# Patient Record
Sex: MALE | Race: WHITE | Employment: OTHER | URBAN - METROPOLITAN AREA
[De-identification: names, ages, dates, MRNs, and addresses within clinical notes are randomized per-mention and may not be internally consistent; named-entity substitution may affect disease eponyms.]

---

## 2017-02-05 ENCOUNTER — APPOINTMENT (OUTPATIENT)
Dept: GENERAL RADIOLOGY | Age: 77
End: 2017-02-05
Attending: EMERGENCY MEDICINE
Payer: MEDICARE

## 2017-02-05 ENCOUNTER — HOSPITAL ENCOUNTER (EMERGENCY)
Age: 77
Discharge: HOME | End: 2017-02-05
Attending: EMERGENCY MEDICINE | Admitting: EMERGENCY MEDICINE
Payer: MEDICARE

## 2017-02-05 VITALS
SYSTOLIC BLOOD PRESSURE: 166 MMHG | RESPIRATION RATE: 16 BRPM | HEART RATE: 79 BPM | TEMPERATURE: 97.8 F | BODY MASS INDEX: 33.86 KG/M2 | HEIGHT: 72 IN | WEIGHT: 250 LBS | DIASTOLIC BLOOD PRESSURE: 84 MMHG | OXYGEN SATURATION: 97 %

## 2017-02-05 DIAGNOSIS — S52.501A CLOSED FRACTURE OF DISTAL END OF RIGHT RADIUS, UNSPECIFIED FRACTURE MORPHOLOGY, INITIAL ENCOUNTER: Primary | ICD-10-CM

## 2017-02-05 PROCEDURE — 99283 EMERGENCY DEPT VISIT LOW MDM: CPT

## 2017-02-05 PROCEDURE — 74011250636 HC RX REV CODE- 250/636: Performed by: EMERGENCY MEDICINE

## 2017-02-05 PROCEDURE — 73100 X-RAY EXAM OF WRIST: CPT

## 2017-02-05 PROCEDURE — 74011000250 HC RX REV CODE- 250: Performed by: EMERGENCY MEDICINE

## 2017-02-05 PROCEDURE — L3650 SO 8 ABD RESTRAINT PRE OTS: HCPCS

## 2017-02-05 PROCEDURE — 75810000303 HC CLSD TRMT  FRACTURE/DISLOCATION W/  ANES

## 2017-02-05 PROCEDURE — 73110 X-RAY EXAM OF WRIST: CPT

## 2017-02-05 RX ORDER — MELOXICAM 15 MG/1
15 TABLET ORAL DAILY
COMMUNITY

## 2017-02-05 RX ORDER — FLUOXETINE HYDROCHLORIDE 20 MG/1
20 CAPSULE ORAL DAILY
COMMUNITY
End: 2022-04-05

## 2017-02-05 RX ORDER — HYDROMORPHONE HYDROCHLORIDE 2 MG/ML
2 INJECTION, SOLUTION INTRAMUSCULAR; INTRAVENOUS; SUBCUTANEOUS
Status: DISCONTINUED | OUTPATIENT
Start: 2017-02-05 | End: 2017-02-05 | Stop reason: HOSPADM

## 2017-02-05 RX ORDER — TRAMADOL HYDROCHLORIDE 50 MG/1
50-100 TABLET ORAL
Qty: 25 TAB | Refills: 0 | Status: SHIPPED | OUTPATIENT
Start: 2017-02-05 | End: 2020-11-13

## 2017-02-05 RX ORDER — BUPIVACAINE HYDROCHLORIDE 5 MG/ML
5 INJECTION, SOLUTION EPIDURAL; INTRACAUDAL ONCE
Status: COMPLETED | OUTPATIENT
Start: 2017-02-05 | End: 2017-02-05

## 2017-02-05 RX ADMIN — BUPIVACAINE HYDROCHLORIDE 25 MG: 5 INJECTION, SOLUTION EPIDURAL; INTRACAUDAL at 11:24

## 2017-02-05 NOTE — ED PROVIDER NOTES
HPI Comments: 78 y.o. male with past medical history significant for hypercholesterolemia, depression, insomnia, obesity, peripheral neuropathy, PVCs, cardiac catheterization who presents from home with friend with chief complaint of right wrist pain. Pt reports that around 0530 this morning, pt went to his front door to  the paper. Pt bent over to  the paper and as he was standing up, he lost his balance and fell backwards into a wall. Pt then used his right arm to break the fall causing the moderate right wrist pain. When the pt was able to stand back up, he reports that he was briefly dizzy and went to go lay in bed. Pt currently rates the pain at 5/10. Pt denies hitting his head, HA, neck pain, and back pain. Pt also reports intermittent LE edema. Pt denies being on anticoagulants. Pt denies analgesic use and ice pack use PTA. Pt denies frequent falls and reports that his last fall was 6 months ago due to loss of balance. There are no other acute medical concerns at this time. Pt is R hand dominant. Social hx: former smoker, no EtOH use  PCP: Werner Noriega MD  Note written by Bradley Toussaint, as dictated by Yuly Vega, DO 11:11 AM        The history is provided by the patient. No  was used. Past Medical History:   Diagnosis Date    Depression     Hypercholesterolemia     Insomnia     Obesity     Peripheral neuropathy (HCC)     PVC's (premature ventricular contractions)        Past Surgical History:   Procedure Laterality Date    Pr cardiac surg procedure unlist  9/2010     catheterization    Endoscopy, colon, diagnostic  2003, 2010         Family History:   Problem Relation Age of Onset    Heart Disease Mother     Heart Disease Father        Social History     Social History    Marital status:      Spouse name: N/A    Number of children: N/A    Years of education: N/A     Occupational History    Not on file. Social History Main Topics    Smoking status: Former Smoker     Quit date: 2/25/2011    Smokeless tobacco: Not on file    Alcohol use No    Drug use: Not on file    Sexual activity: Not on file     Other Topics Concern    Not on file     Social History Narrative         ALLERGIES: Review of patient's allergies indicates no known allergies. Review of Systems   Musculoskeletal: Positive for arthralgias (right wrist pain). Negative for back pain and neck pain. Neurological: Negative for headaches. All other systems reviewed and are negative. Vitals:    02/05/17 0950   BP: 159/84   Pulse: 88   Resp: 16   Temp: 97.5 °F (36.4 °C)   SpO2: 98%   Weight: 113.4 kg (250 lb)   Height: 6' (1.829 m)            Physical Exam   Musculoskeletal:        Arms:  Nursing note and vitals reviewed. Constitutional: Pt is awake and alert. Pt appears well-developed and well-nourished. NAD. HENT:   Head: Normocephalic and atraumatic. Nose: Nose normal.   Mouth/Throat: Oropharynx is clear and moist. No oropharyngeal exudate. Eyes: Conjunctivae and extraocular motions are normal. Pupils are equal, round, and reactive to light. Right eye exhibits no discharge. Left eye exhibits no discharge. No scleral icterus. Neck: No tracheal deviation present. Supple neck. Cardiovascular: Normal rate, regular rhythm, normal heart sounds and intact distal pulses. Exam reveals no gallop and no friction rub. Intact radial pulse. No murmur heard. Pulmonary/Chest: Effort normal and breath sounds normal.  Pt  has no wheezes. Pt  has no rales. Abdominal: Soft. Pt  exhibits no distension and no mass. No tenderness. Pt  has no rebound and no guarding. Musculoskeletal:  Swollen and deformed wrist. Swollen fingers. Ext:  ; distal pulses are normal. Intact motor and sensory exam  Neurological:  Pt is alert. nonfocal neuro exam.  Skin: Skin is warm and dry. Pt  is not diaphoretic.    Psychiatric:  Pt  has a normal mood and affect. Behavior is normal.   Note written by Bradley Meng, as dictated by Alexus Esparza DO 11:13 AM            [facility]  ED Course       Splint, Jolynn Hensley  Date/Time: 2/5/2017 11:54 AM  Performed by: Romeo Olson  Authorized by: Romeo Olson     Consent:     Consent obtained:  Written    Consent given by:  Patient    Risks discussed:  Pain    Alternatives discussed:  No treatment  Pre-procedure details:     Sensation:  Normal  Procedure details:     Laterality:  Right    Location:  Arm    Arm:  R lower arm    Cast type:  Short arm    Splint type:  Sugar tong    Supplies:  Elastic bandage, Ortho-Glass and cotton padding  Post-procedure details:     Pain:  Improved    Sensation:  Normal    Skin color:  Pink    Patient tolerance of procedure: Tolerated well, no immediate complications                 11:12 AM  Hematoma block finished         Procedure Note - Reduction:  Right wrist, closed reduction distal radius fracture. Performed by Alexus Esparza DO . Immediately prior to the procedure, the patient was reevaluated and found suitable for the planned procedure and any planned medications. Immediately prior to the procedure a time out was called to verify the correct patient, procedure, equipment, staff, and marking as appropriate. Prior to the procedure, neurovascular exam was intact. Analgesia was obtained with hematoma block. .   To achieve reduction of the patient's right wrist joint, logitudinal traction manipulation was utilized with weights and finger traps. The fracture was successfully reduced. Neurovascular exam was intact following the procedure. The procedure was tolerated well. xrays reviewed    Plan - refer to ortho    12:41 PM  Pt was cautioned about using Tramadol with Clonazepam due to drug-drug interaction.

## 2017-02-05 NOTE — DISCHARGE INSTRUCTIONS
We hope that we have addressed all of your medical concerns. The examination and treatment you received in the Emergency Department were for an emergent problem and were not intended as complete care. It is important that you follow up with your healthcare provider(s) for ongoing care. If your symptoms worsen or do not improve as expected, and you are unable to reach your usual health care provider(s), you should return to the Emergency Department. Today's healthcare is undergoing tremendous change, and patient satisfaction surveys are one of the many tools to assess the quality of medical care. You may receive a survey from the [employer] regarding your experience in the Emergency Department. I hope that your experience has been completely positive, particularly the medical care that I provided. As such, please participate in the survey; anything less than excellent does not meet my expectations or intentions. [facility] and [address] [place] [address] participate in nationally recognized quality of care measures. If your blood pressure is greater than 120/80, as reported below, we urge that you seek medical care to address the potential of high blood pressure, commonly known as hypertension. Hypertension can be hereditary or can be caused by certain medical conditions, pain, stress, or \"white coat syndrome. \"       Please make an appointment with your health care provider(s) for follow up of your Emergency Department visit. VITALS:   Patient Vitals for the past 8 hrs:   Temp Pulse Resp BP SpO2   02/05/17 0950 97.5 °F (36.4 °C) 88 16 159/84 98 %          Thank you for allowing us to provide you with medical care today. We realize that you have many choices for your emergency care needs. Please choose us in the future for any continued health care needs.       Regards,           Isaac Bahena, DO    [employer]   Office: [phone]            No results found for this or any previous visit (from the past 24 hour(s)). Xr Wrist Rt Ap/lat    Result Date: 2/5/2017  INDICATION: will call when ready - post reduction. EXAM: Right wrist. COMPARISON: Earlier same day. FINDINGS: AP and lateral views of the right wrist show satisfactory distal radial metaphyseal fracture reduction appearance, with cast placement. IMPRESSION: Satisfactory postreduction appearance of the distal radial fracture. Xr Wrist Rt Ap/lat/obl Min 3v    Result Date: 2/5/2017  INDICATION: fall Right wrist exam. Three-view right wrist exam shows fracture of the distal radial metaphysis with dorsal displacement and angulation of the distal fragment. Ulna and carpal bones appear intact. There is soft tissue swelling. IMPRESSION: Distal radial fracture. Broken Wrist: Care Instructions  Your Care Instructions    Your wrist can break, or fracture, during sports, a fall, or other accidents. The break may happen when your wrist is hit or is used to protect you in a fall. Fractures can range from a small, hairline crack, to a bone or bones broken into two or more pieces. Your treatment depends on how bad the break is. Your doctor may have put your wrist in a cast or splint. This will help keep your wrist stable until your follow-up appointment. It may take weeks or months for your wrist to heal. You can help it heal with care at home. You heal best when you take good care of yourself. Eat a variety of healthy foods, and don't smoke. Follow-up care is a key part of your treatment and safety. Be sure to make and go to all appointments, and call your doctor if you are having problems. It's also a good idea to know your test results and keep a list of the medicines you take. How can you care for yourself at home? · Put ice or a cold pack on your wrist for 10 to 20 minutes at a time.  Try to do this every 1 to 2 hours for the next 3 days (when you are awake). Put a thin cloth between the ice and your cast or splint. Keep your cast or splint dry. · Follow the splint or cast care instructions your doctor gives you. If you have a splint, do not take it off unless your doctor tells you to. Be careful not to put the splint on too tight. · Be safe with medicines. Take pain medicines exactly as directed. ¨ If the doctor gave you a prescription medicine for pain, take it as prescribed. ¨ If you are not taking a prescription pain medicine, ask your doctor if you can take an over-the-counter medicine. · Prop up your wrist on pillows when you sit or lie down in the first few days after the injury. Keep your wrist higher than the level of your heart. This will help reduce swelling. · Move your fingers often to reduce swelling and stiffness, but do not use that hand to grab or carry anything. · Follow instructions for exercises to keep your arm strong. When should you call for help? Call your doctor now or seek immediate medical care if:  · You have increased or severe pain. · Your cast or splint feels too tight. · You cannot move your fingers. · You have tingling, weakness, or numbness in your hand and fingers. · Your hand and fingers are cool or pale or change color. · You have a lot of swelling near your cast or splint. · The skin under your cast or splint is burning or stinging. Watch closely for changes in your health, and be sure to contact your doctor if:  · You do not get better as expected. Where can you learn more? Go to http://genie-stan.info/. Enter [id number] in the search box to learn more about \"Broken Wrist: Care Instructions. \"  Current as of: May 23, 2016  Content Version: 11.1  © 2948-8026 [employer]. Care instructions adapted under license by [employer] (which disclaims liability or warranty for this information).  If you have questions about a medical condition or this instruction, always ask your healthcare professional. [place] [address] any warranty or liability for your use of this information. Caring for Your Splint: After Your Visit  Your Care Instructions     A splint protects a broken bone or other injury. If you have a removable splint, follow your doctor's instructions and only remove the splint if your doctor says you can. Most splints can be adjusted. Your doctor will show you how to do this and will tell you when you might need to adjust the splint. Many splints are premade. Your doctor may also make a splint from plaster or fiberglass. Some splints have a built-in air cushion. Air pads are inflated to hold the injured area in place. Follow-up care is a key part of your treatment and safety. Be sure to make and go to all appointments, and call your doctor if you are having problems. It's also a good idea to know your test results and keep a list of the medicines you take. How can you care for yourself at home? General care  · Don't put any weight on a splint. If you have a walking boot, your doctor will tell you when you can put weight on it. · If the fingers or toes on the limb with the splint were not injured, wiggle them every now and then. This helps move the blood and fluids in the injured limb. · Prop up the injured arm or leg on a pillow when you ice it or anytime you sit or lie down during the next 3 days. Try to keep it above the level of your heart. This will help reduce swelling. · Put ice or cold packs on the hurt area for 10 to 20 minutes at a time. Try to do this every 1 to 2 hours for the next 3 days (when you are awake) or until the swelling goes down. Be careful not to get the splint wet. · Be safe with medicines. Read and follow all instructions on the label. ¨ If the doctor gave you a prescription medicine for pain, take it as prescribed.   ¨ If you are not taking a prescription pain medicine, ask your doctor if you can take an over-the-counter medicine. · Keep up your muscle strength and tone as much as you can while protecting your injured limb or joint. Your doctor may want you to tense and relax the muscles protected by the splint. Check with your doctor or physical therapist for instructions. Splint and skin care  · If your splint is not to be removed, try blowing cool air from a hair dryer or fan into the splint to help relieve itching. Never stick items under your splint to scratch the skin. · Do not use oils or lotions near your splint. If the skin becomes red or sore around the edge of the splint, you may pad the edges with a soft material, such as moleskin, or use tape to cover the edges. · If you're allowed to take your splint off, be sure your skin is dry before you put it back on. · Watch for pressure sores. These can develop over bony areas. Symptoms include a warm spot under the cast, pain, drainage, or an odor. Call your doctor if you think you have a pressure sore. · Watch for compartment syndrome. This happens when pressure builds up in a group of muscles, nerves, and blood vessels. It is an emergency. Symptoms include severe pain or tingling or numbness. Water and your splint  · Keep your splint dry. Moisture can collect under the splint and cause skin irritation and itching. If you have a wound or have had surgery, moisture under the splint can increase the risk of infection. · Cover your splint with at least two layers of plastic when you take a shower or bath, unless your doctor said you can take it off while bathing. · If you can take the splint off when you bathe, pat the area dry after bathing and put the splint back on.  · If your splint gets a little wet, you can dry it with a hair dryer. Use a \"cool\" setting. When should you call for help? Call your doctor now or seek immediate medical care if:  · You have increased or severe pain. · You feel a warm or painful spot under the splint.   · You have problems with your splint. For example:  ¨ The skin under the splint burns or stings. ¨ The splint feels too tight. ¨ There is a lot of swelling near the splint. (Some swelling is normal.)  ¨ You have a new fever. ¨ There is drainage or a bad smell coming from the splint. · Your foot or hand is cool or pale or changes color. · You have trouble moving your fingers or toes. · You have symptoms of a blood clot in your arm or leg (called a deep vein thrombosis). These may include:  ¨ Pain in the arm, calf, back of the knee, thigh, or groin. ¨ Redness and swelling in the arm, leg, or groin. Watch closely for changes in your health, and be sure to contact your doctor if:  · The splint is breaking apart or losing its shape. · You are not getting better as expected. Where can you learn more? Go to [employer]  Enter M593 in the search box to learn more about \"Caring for Your Splint: After Your Visit. \"   © 2957-0716 [employer]. Care instructions adapted under license by [employer] (which disclaims liability or warranty for this information). This care instruction is for use with your licensed healthcare professional. If you have questions about a medical condition or this instruction, always ask your healthcare professional. [address] any warranty or liability for your use of this information.   Content Version: 57.7.964038; Current as of: June 4, 2014

## 2017-02-05 NOTE — ED TRIAGE NOTES
Pt states that he lost his balance this morning an fell backwards, slid down against a wall landing on his right hand. Pain and swelling noted to pt's right wrist area.

## 2017-02-05 NOTE — ED NOTES
DO and I have reviewed discharge instructions with the patient. The patient verbalized understanding.

## 2020-11-17 PROBLEM — H25.11 AGE-RELATED NUCLEAR CATARACT, RIGHT EYE: Chronic | Status: ACTIVE | Noted: 2020-11-17

## 2020-11-18 PROBLEM — H25.11 AGE-RELATED NUCLEAR CATARACT, RIGHT EYE: Chronic | Status: RESOLVED | Noted: 2020-11-17 | Resolved: 2020-11-18

## 2020-12-01 PROBLEM — H25.12 AGE-RELATED NUCLEAR CATARACT, LEFT EYE: Chronic | Status: ACTIVE | Noted: 2020-12-01

## 2020-12-02 PROBLEM — H25.12 AGE-RELATED NUCLEAR CATARACT, LEFT EYE: Chronic | Status: RESOLVED | Noted: 2020-12-01 | Resolved: 2020-12-02

## 2022-04-05 ENCOUNTER — OFFICE VISIT (OUTPATIENT)
Dept: FAMILY MEDICINE CLINIC | Age: 82
End: 2022-04-05
Payer: MEDICARE

## 2022-04-05 VITALS
BODY MASS INDEX: 34.54 KG/M2 | OXYGEN SATURATION: 96 % | HEART RATE: 80 BPM | RESPIRATION RATE: 16 BRPM | DIASTOLIC BLOOD PRESSURE: 68 MMHG | SYSTOLIC BLOOD PRESSURE: 110 MMHG | WEIGHT: 255 LBS | TEMPERATURE: 97.2 F | HEIGHT: 72 IN

## 2022-04-05 DIAGNOSIS — E11.9 TYPE 2 DIABETES MELLITUS TREATED WITHOUT INSULIN (HCC): ICD-10-CM

## 2022-04-05 DIAGNOSIS — F32.A ANXIETY AND DEPRESSION: ICD-10-CM

## 2022-04-05 DIAGNOSIS — E11.42 DIABETIC PERIPHERAL NEUROPATHY ASSOCIATED WITH TYPE 2 DIABETES MELLITUS (HCC): ICD-10-CM

## 2022-04-05 DIAGNOSIS — F41.9 ANXIETY AND DEPRESSION: ICD-10-CM

## 2022-04-05 DIAGNOSIS — H61.23 BILATERAL IMPACTED CERUMEN: Primary | ICD-10-CM

## 2022-04-05 PROCEDURE — 99204 OFFICE O/P NEW MOD 45 MIN: CPT | Performed by: FAMILY MEDICINE

## 2022-04-05 PROCEDURE — G9717 DOC PT DX DEP/BP F/U NT REQ: HCPCS | Performed by: FAMILY MEDICINE

## 2022-04-05 PROCEDURE — G8417 CALC BMI ABV UP PARAM F/U: HCPCS | Performed by: FAMILY MEDICINE

## 2022-04-05 PROCEDURE — G8427 DOCREV CUR MEDS BY ELIG CLIN: HCPCS | Performed by: FAMILY MEDICINE

## 2022-04-05 PROCEDURE — 1101F PT FALLS ASSESS-DOCD LE1/YR: CPT | Performed by: FAMILY MEDICINE

## 2022-04-05 PROCEDURE — G8536 NO DOC ELDER MAL SCRN: HCPCS | Performed by: FAMILY MEDICINE

## 2022-04-05 RX ORDER — SERTRALINE HYDROCHLORIDE 25 MG/1
TABLET, FILM COATED ORAL
COMMUNITY
Start: 2021-12-16 | End: 2022-04-05 | Stop reason: ALTCHOICE

## 2022-04-05 RX ORDER — POLYETHYLENE GLYCOL 3350 17 G/17G
1 POWDER, FOR SOLUTION ORAL AS NEEDED
COMMUNITY

## 2022-04-05 RX ORDER — DIAZEPAM 5 MG/1
TABLET ORAL
COMMUNITY
Start: 2022-01-27 | End: 2022-07-07

## 2022-04-05 RX ORDER — GABAPENTIN 300 MG/1
300 CAPSULE ORAL 2 TIMES DAILY
Qty: 60 CAPSULE | Refills: 5 | Status: SHIPPED | OUTPATIENT
Start: 2022-04-05 | End: 2022-10-16

## 2022-04-05 RX ORDER — SERTRALINE HYDROCHLORIDE 50 MG/1
50 TABLET, FILM COATED ORAL DAILY
Qty: 90 TABLET | Refills: 1 | Status: SHIPPED | OUTPATIENT
Start: 2022-04-05 | End: 2022-07-07 | Stop reason: DRUGHIGH

## 2022-04-05 NOTE — PROGRESS NOTES
1. Have you been to the ER, urgent care clinic since your last visit? Hospitalized since your last visit No    2. Have you seen or consulted any other health care providers outside of the [address] [place] [address] since your last visit? Include any pap smears or colon screening. Yes - 3/21/22 [facility]     4/5/2022      Chief Complaint   Patient presents with    Establish Care         History of Present Illness:        Ramu Lynn is a [de-identified] y.o. male followed most recently at [facility] for his controlled DM with peripheral neuropathy, HTN, and lipids. HgbA1c in March was 6.1% Feels a little depressed, was switched from fluoxetine to very low dose sertraline in December. No anxiety. Lived in [place] before moving here about three years ago. Greatest concern is proximal muscle weakness, numbness and tingling without pain in his extremities, and poor balance because he feels like he's walking on something in his shoes. Has done a couple of courses of PT for this which he found helpful, but admits to did not continue maintenance exercise. Sleeping OK, did not tolerate CPAP for YUSRA. No Known Allergies    Current Outpatient Medications   Medication Sig    polyethylene glycol (MIRALAX) 17 gram packet Take 1 Package by mouth as needed.  gabapentin (NEURONTIN) 300 mg capsule Take 1 Capsule by mouth two (2) times a day. Max Daily Amount: 600 mg.  sertraline (ZOLOFT) 50 mg tablet Take 1 Tablet by mouth daily.  hydroCHLOROthiazide (HYDRODIURIL) 12.5 mg tablet Take 1 tablet by mouth once daily    metoprolol tartrate (LOPRESSOR) 25 mg tablet Take 1/2 (one-half) tablet by mouth once daily    metFORMIN (GLUCOPHAGE) 500 mg tablet Take 1/2 (one-half) tablet by mouth twice daily    lisinopriL (PRINIVIL, ZESTRIL) 10 mg tablet TAKE 1 TABLET BY MOUTH ONCE DAILY    meloxicam (MOBIC) 15 mg tablet Take 15 mg by mouth daily.     lovastatin (MEVACOR) 40 mg tablet Take 1 Tab by mouth nightly.  ASPIRIN PO Take 81 mg by mouth daily.  diazePAM (VALIUM) 5 mg tablet TAKE 1 TABLET THE NIGHT BEFORE DENTAL VISIT AND 1 TABLET 1 HOUR BEFORE DENTAL VISIT (Patient not taking: Reported on 4/5/2022)     No current facility-administered medications for this visit. Physical Examination:    Visit Vitals  /68 (BP 1 Location: Left upper arm, BP Patient Position: Sitting, BP Cuff Size: Adult)   Pulse 80   Temp 97.2 °F (36.2 °C) (Oral)   Resp 16   Ht 6' (1.829 m)   Wt 255 lb (115.7 kg)   SpO2 96%   BMI 34.58 kg/m²      General:  Alert, cooperative, no distress. HEENT:  Normocephalic, without obvious abnormality, atraumatic. Conjunctivae/corneas clear. Pupils equal, round, reactive to light. Extraocular movements intact. TMs and external canals normal bilaterally. Nasal mucosa and oropharynx clear. Lungs: Clear to auscultation bilaterally. Chest wall:  No tenderness or deformity. Heart:  Regular rate and rhythm, S1, S2 normal, no murmur, click, rub, or gallop. Abdomen:   Soft, non-tender. Bowel sounds normal. No masses. No organomegaly. Extremities: Extremities normal, atraumatic, no cyanosis or edema. Pulses: 2+ and symmetric all extremities. Skin: Skin color, texture, turgor normal. No rashes or lesions. Lymph nodes: Cervical, supraclavicular, and axillary nodes normal.   Neurologic: CNII-XII intact. Normal strength, sensation, and reflexes throughout. Wide based gait         ASSESSMENT AND PLAN    1. Bilateral impacted cerumen  Return next week for irrigation    2. Diabetic peripheral neuropathy associated with type 2 diabetes mellitus (HCC)  Increase gabapentin  - gabapentin (NEURONTIN) 300 mg capsule; Take 1 Capsule by mouth two (2) times a day. Max Daily Amount: 600 mg. Dispense: 60 Capsule; Refill: 5    3. Anxiety and depression  Increase sertraline  - sertraline (ZOLOFT) 50 mg tablet; Take 1 Tablet by mouth daily. Dispense: 90 Tablet; Refill: 1    4.  Type 2 diabetes mellitus treated without insulin ([facility] [address]  Good control            Orders Placed This Encounter    polyethylene glycol (MIRALAX) 17 gram packet     Sig: Take 1 Package by mouth as needed.  diazePAM (VALIUM) 5 mg tablet     Sig: TAKE 1 TABLET THE NIGHT BEFORE DENTAL VISIT AND 1 TABLET 1 HOUR BEFORE DENTAL VISIT    DISCONTD: sertraline (ZOLOFT) 25 mg tablet     Sig: Indications: Generalized Anxiety Disorder, Major Depressive Disorder One pill every morning with breakfast for anxiety and depression    gabapentin (NEURONTIN) 300 mg capsule     Sig: Take 1 Capsule by mouth two (2) times a day. Max Daily Amount: 600 mg. Dispense:  60 Capsule     Refill:  5    sertraline (ZOLOFT) 50 mg tablet     Sig: Take 1 Tablet by mouth daily.      Dispense:  90 Tablet     Refill:  1       RTC 3 months    Tino Short MD

## 2022-04-05 NOTE — PATIENT INSTRUCTIONS
Increase gabapentin to 300 mg twice daily  Increase sertraline to 50 mg daily  Recheck in 3 months  Comeback next week to get ears irrigated

## 2022-04-14 ENCOUNTER — OFFICE VISIT (OUTPATIENT)
Dept: FAMILY MEDICINE CLINIC | Age: 82
End: 2022-04-14
Payer: MEDICARE

## 2022-04-14 VITALS
DIASTOLIC BLOOD PRESSURE: 84 MMHG | HEIGHT: 72 IN | HEART RATE: 77 BPM | WEIGHT: 255 LBS | SYSTOLIC BLOOD PRESSURE: 127 MMHG | OXYGEN SATURATION: 94 % | TEMPERATURE: 98 F | BODY MASS INDEX: 34.54 KG/M2 | RESPIRATION RATE: 14 BRPM

## 2022-04-14 DIAGNOSIS — H61.23 BILATERAL IMPACTED CERUMEN: Primary | ICD-10-CM

## 2022-04-14 PROCEDURE — 69210 REMOVE IMPACTED EAR WAX UNI: CPT | Performed by: FAMILY MEDICINE

## 2022-04-14 PROCEDURE — G8536 NO DOC ELDER MAL SCRN: HCPCS | Performed by: FAMILY MEDICINE

## 2022-04-14 PROCEDURE — 1101F PT FALLS ASSESS-DOCD LE1/YR: CPT | Performed by: FAMILY MEDICINE

## 2022-04-14 PROCEDURE — G8417 CALC BMI ABV UP PARAM F/U: HCPCS | Performed by: FAMILY MEDICINE

## 2022-04-14 PROCEDURE — G9717 DOC PT DX DEP/BP F/U NT REQ: HCPCS | Performed by: FAMILY MEDICINE

## 2022-04-14 PROCEDURE — G8427 DOCREV CUR MEDS BY ELIG CLIN: HCPCS | Performed by: FAMILY MEDICINE

## 2022-04-14 NOTE — PROGRESS NOTES
Kenia Parra is a [de-identified] y.o. male who presents to the office today with the following:  No chief complaint on file. HPI  Saw Dr Alisa Aldrich a week ago  Noted ear wax in both ears  No trouble hearing per pt, but per daughter  Was to use Debrox, but did not do it      ROS  See HPI. Past Medical History:   Diagnosis Date    Depression     Hypercholesterolemia     Insomnia     Obesity     Peripheral neuropathy     PVC's (premature ventricular contractions)     Type 2 diabetes mellitus treated without insulin ([facility] [address]        Past Surgical History:   Procedure Laterality Date    ENDOSCOPY, COLON, DIAGNOSTIC  2003, 2010    [place] CARDIAC SURG PROCEDURE UNLIST  9/2010    catheterization       No Known Allergies    Current Outpatient Medications   Medication Sig    polyethylene glycol (MIRALAX) 17 gram packet Take 1 Package by mouth as needed.  gabapentin (NEURONTIN) 300 mg capsule Take 1 Capsule by mouth two (2) times a day. Max Daily Amount: 600 mg.  sertraline (ZOLOFT) 50 mg tablet Take 1 Tablet by mouth daily.  hydroCHLOROthiazide (HYDRODIURIL) 12.5 mg tablet Take 1 tablet by mouth once daily    metoprolol tartrate (LOPRESSOR) 25 mg tablet Take 1/2 (one-half) tablet by mouth once daily    metFORMIN (GLUCOPHAGE) 500 mg tablet Take 1/2 (one-half) tablet by mouth twice daily    lisinopriL (PRINIVIL, ZESTRIL) 10 mg tablet TAKE 1 TABLET BY MOUTH ONCE DAILY    meloxicam (MOBIC) 15 mg tablet Take 15 mg by mouth daily.  lovastatin (MEVACOR) 40 mg tablet Take 1 Tab by mouth nightly.  ASPIRIN PO Take 81 mg by mouth daily.  diazePAM (VALIUM) 5 mg tablet TAKE 1 TABLET THE NIGHT BEFORE DENTAL VISIT AND 1 TABLET 1 HOUR BEFORE DENTAL VISIT (Patient not taking: Reported on 4/5/2022)     No current facility-administered medications for this visit. Social History     Socioeconomic History    Marital status:     Number of children: 4    Highest education level:  Bachelor's degree (e.g., BA, AB, BS)   Tobacco Use    Smoking status: Former Smoker     Packs/day: 1.00     Types: Cigarettes     Start date: 1962     Quit date: 2011     Years since quittin.1    Smokeless tobacco: Never Used   Vaping Use    Vaping Use: Never used   Substance and Sexual Activity    Alcohol use: No    Drug use: Never       Family History   Problem Relation Age of Onset    Heart Disease Mother     Heart Disease Father          Physical Exam:  Visit Vitals  /84   Pulse 77   Temp 98 °F (36.7 °C)   Resp 14   Ht 6' (1.829 m)   Wt 255 lb (115.7 kg)   SpO2 94%   BMI 34.58 kg/m²     Physical Exam  Vitals and nursing note reviewed. Constitutional:       Appearance: He is obese. HENT:      Head: Normocephalic. Right Ear: There is impacted cerumen. Left Ear: There is impacted cerumen. Eyes:      Conjunctiva/sclera: Conjunctivae normal.   Pulmonary:      Effort: Pulmonary effort is normal.   Neurological:      Mental Status: He is alert and oriented to person, place, and time.    Psychiatric:         Mood and Affect: Mood normal.         Behavior: Behavior normal.         Cerumen removal bilat with lavage and curette successful, TM's intact bilat    Assessment/Plan:    ICD-10-CM ICD-9-CM    1. Bilateral impacted cerumen  H61.23 380.4 REMOVAL IMPACTED CERUMEN IRRIGATION/LVG UNILAT           Bar Martino MD

## 2022-04-14 NOTE — PROGRESS NOTES
1. \"Have you been to the ER, urgent care clinic since your last visit? Hospitalized since your last visit? \" No    2. \"Have you seen or consulted any other health care providers outside of the [address] [place] [address] since your last visit? \" No     3. For patients aged 39-70: Has the patient had a colonoscopy / FIT/ Cologuard? Yes - no Care Gap present        If the patient is female:    4. For patients aged 41-77: Has the patient had a mammogram within the past 2 years? NA - based on age or sex      11. For patients aged 21-65: Has the patient had a pap smear?  NA - based on age or sex

## 2022-07-07 ENCOUNTER — OFFICE VISIT (OUTPATIENT)
Dept: FAMILY MEDICINE CLINIC | Age: 82
End: 2022-07-07
Payer: MEDICARE

## 2022-07-07 VITALS
HEART RATE: 52 BPM | BODY MASS INDEX: 36.16 KG/M2 | HEIGHT: 72 IN | SYSTOLIC BLOOD PRESSURE: 127 MMHG | RESPIRATION RATE: 14 BRPM | TEMPERATURE: 98.2 F | DIASTOLIC BLOOD PRESSURE: 54 MMHG | OXYGEN SATURATION: 95 % | WEIGHT: 267 LBS

## 2022-07-07 DIAGNOSIS — D72.829 LEUKOCYTOSIS, UNSPECIFIED TYPE: ICD-10-CM

## 2022-07-07 DIAGNOSIS — F41.9 ANXIETY AND DEPRESSION: Primary | ICD-10-CM

## 2022-07-07 DIAGNOSIS — R63.5 WEIGHT GAIN: ICD-10-CM

## 2022-07-07 DIAGNOSIS — F32.A ANXIETY AND DEPRESSION: Primary | ICD-10-CM

## 2022-07-07 PROCEDURE — 1101F PT FALLS ASSESS-DOCD LE1/YR: CPT | Performed by: FAMILY MEDICINE

## 2022-07-07 PROCEDURE — G8417 CALC BMI ABV UP PARAM F/U: HCPCS | Performed by: FAMILY MEDICINE

## 2022-07-07 PROCEDURE — G8427 DOCREV CUR MEDS BY ELIG CLIN: HCPCS | Performed by: FAMILY MEDICINE

## 2022-07-07 PROCEDURE — 99214 OFFICE O/P EST MOD 30 MIN: CPT | Performed by: FAMILY MEDICINE

## 2022-07-07 PROCEDURE — G9717 DOC PT DX DEP/BP F/U NT REQ: HCPCS | Performed by: FAMILY MEDICINE

## 2022-07-07 PROCEDURE — 1123F ACP DISCUSS/DSCN MKR DOCD: CPT | Performed by: FAMILY MEDICINE

## 2022-07-07 PROCEDURE — G8536 NO DOC ELDER MAL SCRN: HCPCS | Performed by: FAMILY MEDICINE

## 2022-07-07 RX ORDER — SERTRALINE HYDROCHLORIDE 100 MG/1
100 TABLET, FILM COATED ORAL DAILY
Qty: 30 TABLET | Refills: 0 | Status: SHIPPED | OUTPATIENT
Start: 2022-07-07 | End: 2022-08-23 | Stop reason: SDUPTHER

## 2022-07-07 NOTE — PROGRESS NOTES
Shari Krabbe is a [de-identified] y.o. male who presents to the office today with the following:  Chief Complaint   Patient presents with    Diabetes     follow up       HPI  DM  Last HbA1C 6.1  About 3 mo ago  On Metformin  No BS checks    HTN  BP is good    Hyperlipidemia  Last Chol good  Due for recheck in 3 mo    More depressed than usually  Would like to increase Zoloft  helping some but not enough  No SE with it    Leukocytosis 3/16/22  No sign of infection    Weight gain lately  and  Swelling in left ankle, always, but has gotten worse  Has been eating TV dinners  Not watching Sodium  No calf pain or tenderness, but numbness radiating to left knee for 2 d, now all gone  Also pain in knee 2 d ago but pain better now  Sitting a lot on computer and crossing legs    Review of Systems   Constitutional: Negative for fever and weight loss. HENT: Negative for congestion. Respiratory: Positive for cough (little occ). Cardiovascular: Positive for leg swelling. Negative for chest pain and orthopnea. Gastrointestinal: Negative for abdominal pain. Genitourinary: Negative. Neurological: Negative for dizziness and headaches. See HPI. Past Medical History:   Diagnosis Date    Depression     Hypercholesterolemia     Insomnia     Obesity     Peripheral neuropathy     PVC's (premature ventricular contractions)     Type 2 diabetes mellitus treated without insulin ([facility] [address]        Past Surgical History:   Procedure Laterality Date    ENDOSCOPY, COLON, DIAGNOSTIC  2003, 2010    [place] CARDIAC SURG PROCEDURE UNLIST  9/2010    catheterization       No Known Allergies    Current Outpatient Medications   Medication Sig    sertraline (ZOLOFT) 100 mg tablet Take 1 Tablet by mouth daily.  polyethylene glycol (MIRALAX) 17 gram packet Take 1 Package by mouth as needed.  gabapentin (NEURONTIN) 300 mg capsule Take 1 Capsule by mouth two (2) times a day. Max Daily Amount: 600 mg.     hydroCHLOROthiazide (HYDRODIURIL) 12.5 mg tablet Take 1 tablet by mouth once daily    metoprolol tartrate (LOPRESSOR) 25 mg tablet Take 1/2 (one-half) tablet by mouth once daily    metFORMIN (GLUCOPHAGE) 500 mg tablet Take 1/2 (one-half) tablet by mouth twice daily    lisinopriL (PRINIVIL, ZESTRIL) 10 mg tablet TAKE 1 TABLET BY MOUTH ONCE DAILY    meloxicam (MOBIC) 15 mg tablet Take 15 mg by mouth daily.  lovastatin (MEVACOR) 40 mg tablet Take 1 Tab by mouth nightly.  ASPIRIN PO Take 81 mg by mouth daily. No current facility-administered medications for this visit. Social History     Socioeconomic History    Marital status:     Number of children: 4    Highest education level: Bachelor's degree (e.g., BA, AB, BS)   Tobacco Use    Smoking status: Former Smoker     Packs/day: 1.00     Types: Cigarettes     Start date: 1962     Quit date: 2011     Years since quittin.3    Smokeless tobacco: Never Used   Vaping Use    Vaping Use: Never used   Substance and Sexual Activity    Alcohol use: No    Drug use: Never       Family History   Problem Relation Age of Onset    Heart Disease Mother     Heart Disease Father          Physical Exam:  Visit Vitals  BP (!) 127/54   Pulse (!) 52   Temp 98.2 °F (36.8 °C)   Resp 14   Ht 6' (1.829 m)   Wt 267 lb (121.1 kg)   SpO2 95%   BMI 36.21 kg/m²     Physical Exam  Vitals and nursing note reviewed. Constitutional:       Appearance: He is obese. HENT:      Head: Atraumatic. Right Ear: Tympanic membrane, ear canal and external ear normal.      Left Ear: Tympanic membrane, ear canal and external ear normal.   Eyes:      Extraocular Movements: Extraocular movements intact. Conjunctiva/sclera: Conjunctivae normal.   Cardiovascular:      Rate and Rhythm: Normal rate and regular rhythm. Heart sounds: Normal heart sounds. Pulmonary:      Effort: Pulmonary effort is normal.      Breath sounds: Normal breath sounds.    Abdominal:      General: There is no distension. Palpations: Abdomen is soft. Tenderness: There is no abdominal tenderness. There is no right CVA tenderness, left CVA tenderness or guarding. Musculoskeletal:      Right lower leg: No edema. Left lower leg: Edema present. Comments: Left ankle trace of edema, no calf tenderness and neg Anita's   Lymphadenopathy:      Cervical: No cervical adenopathy. Skin:     General: Skin is warm and dry. Neurological:      Mental Status: He is alert and oriented to person, place, and time. Psychiatric:         Mood and Affect: Mood normal.         Behavior: Behavior normal.         Assessment/Plan:    ICD-10-CM ICD-9-CM    1. Anxiety and depression  F41.9 300.00 sertraline (ZOLOFT) 100 mg tablet    F32. A 311    2. Leukocytosis, unspecified type  D72.829 288.60 CBC WITH AUTOMATED DIFF   3. Weight gain  R63.5 783.1 TSH 3RD GENERATION     Avoid crossing legs  And if sitting a lot elevate legs or use compression stockings  Monitor for calf pain and tenderness  Zoloft increased to 100 mg and recheck in 1 mo      Follow-up and Dispositions    · Return in about 4 weeks (around 8/4/2022).          Char Ho MD

## 2022-07-07 NOTE — PROGRESS NOTES
Labs drawn in r arm per dr baez's orders. Patient tolerated well. 1. \"Have you been to the ER, urgent care clinic since your last visit? Hospitalized since your last visit? \" No    2. \"Have you seen or consulted any other health care providers outside of the [address] [place] [address] since your last visit? \" No     3. For patients aged 39-70: Has the patient had a colonoscopy / FIT/ Cologuard? NA - based on age      If the patient is female:    4. For patients aged 41-77: Has the patient had a mammogram within the past 2 years? NA - based on age or sex      11. For patients aged 21-65: Has the patient had a pap smear?  NA - based on age or sex

## 2022-07-08 LAB
BASOPHILS # BLD: 0.1 K/UL (ref 0–0.1)
BASOPHILS NFR BLD: 1 % (ref 0–1)
DIFFERENTIAL METHOD BLD: ABNORMAL
EOSINOPHIL # BLD: 0.4 K/UL (ref 0–0.4)
EOSINOPHIL NFR BLD: 3 % (ref 0–7)
ERYTHROCYTE [DISTWIDTH] IN BLOOD BY AUTOMATED COUNT: 14.3 % (ref 11.5–14.5)
HCT VFR BLD AUTO: 41 % (ref 36.6–50.3)
HGB BLD-MCNC: 13.1 G/DL (ref 12.1–17)
IMM GRANULOCYTES # BLD AUTO: 0 K/UL (ref 0–0.04)
IMM GRANULOCYTES NFR BLD AUTO: 0 % (ref 0–0.5)
LYMPHOCYTES # BLD: 2.9 K/UL (ref 0.8–3.5)
LYMPHOCYTES NFR BLD: 24 % (ref 12–49)
MCH RBC QN AUTO: 32.8 PG (ref 26–34)
MCHC RBC AUTO-ENTMCNC: 32 G/DL (ref 30–36.5)
MCV RBC AUTO: 102.8 FL (ref 80–99)
MONOCYTES # BLD: 0.8 K/UL (ref 0–1)
MONOCYTES NFR BLD: 6 % (ref 5–13)
NEUTS SEG # BLD: 8 K/UL (ref 1.8–8)
NEUTS SEG NFR BLD: 66 % (ref 32–75)
NRBC # BLD: 0 K/UL (ref 0–0.01)
NRBC BLD-RTO: 0 PER 100 WBC
PLATELET # BLD AUTO: 240 K/UL (ref 150–400)
PMV BLD AUTO: 10.9 FL (ref 8.9–12.9)
RBC # BLD AUTO: 3.99 M/UL (ref 4.1–5.7)
TSH SERPL DL<=0.05 MIU/L-ACNC: 2.49 UIU/ML (ref 0.36–3.74)
WBC # BLD AUTO: 12.1 K/UL (ref 4.1–11.1)

## 2022-07-11 NOTE — PROGRESS NOTES
Notify pt thyroid test wnl. Wbc count still up, but seems to have had this before and Dr. Jordan Quan put \"no sign of infection\" on her notes. If pt begins to feel sick or have other signs of infection he should come in to see one of the other providers while PCP is out of town, if okay, then he can further discuss ongoing leukocytosis with her. Seems to be a stable lab. Will leave for her review when she returns.

## 2022-07-11 NOTE — PROGRESS NOTES
I have called and talked to this patient. I have verified the patient's name and . I have discussed the lab results and recommendations from [facility] MARYANN COWART PA-c. Patient verbalizes understanding at this time.

## 2022-08-23 ENCOUNTER — OFFICE VISIT (OUTPATIENT)
Dept: FAMILY MEDICINE CLINIC | Age: 82
End: 2022-08-23
Payer: MEDICARE

## 2022-08-23 VITALS
WEIGHT: 263 LBS | BODY MASS INDEX: 35.62 KG/M2 | DIASTOLIC BLOOD PRESSURE: 74 MMHG | HEIGHT: 72 IN | RESPIRATION RATE: 12 BRPM | SYSTOLIC BLOOD PRESSURE: 132 MMHG | TEMPERATURE: 98.2 F | OXYGEN SATURATION: 96 % | HEART RATE: 68 BPM

## 2022-08-23 DIAGNOSIS — K92.1: ICD-10-CM

## 2022-08-23 DIAGNOSIS — Z00.00 MEDICARE ANNUAL WELLNESS VISIT, SUBSEQUENT: Primary | ICD-10-CM

## 2022-08-23 DIAGNOSIS — F32.A ANXIETY AND DEPRESSION: ICD-10-CM

## 2022-08-23 DIAGNOSIS — E11.9 TYPE 2 DIABETES MELLITUS TREATED WITHOUT INSULIN (HCC): ICD-10-CM

## 2022-08-23 DIAGNOSIS — F41.9 ANXIETY AND DEPRESSION: ICD-10-CM

## 2022-08-23 DIAGNOSIS — D72.829 LEUKOCYTOSIS, UNSPECIFIED TYPE: ICD-10-CM

## 2022-08-23 DIAGNOSIS — I10 PRIMARY HYPERTENSION: ICD-10-CM

## 2022-08-23 PROCEDURE — 1123F ACP DISCUSS/DSCN MKR DOCD: CPT | Performed by: FAMILY MEDICINE

## 2022-08-23 PROCEDURE — 82272 OCCULT BLD FECES 1-3 TESTS: CPT | Performed by: FAMILY MEDICINE

## 2022-08-23 PROCEDURE — G0439 PPPS, SUBSEQ VISIT: HCPCS | Performed by: FAMILY MEDICINE

## 2022-08-23 PROCEDURE — G8417 CALC BMI ABV UP PARAM F/U: HCPCS | Performed by: FAMILY MEDICINE

## 2022-08-23 PROCEDURE — G9717 DOC PT DX DEP/BP F/U NT REQ: HCPCS | Performed by: FAMILY MEDICINE

## 2022-08-23 PROCEDURE — G8427 DOCREV CUR MEDS BY ELIG CLIN: HCPCS | Performed by: FAMILY MEDICINE

## 2022-08-23 PROCEDURE — 99214 OFFICE O/P EST MOD 30 MIN: CPT | Performed by: FAMILY MEDICINE

## 2022-08-23 PROCEDURE — G8536 NO DOC ELDER MAL SCRN: HCPCS | Performed by: FAMILY MEDICINE

## 2022-08-23 PROCEDURE — 1101F PT FALLS ASSESS-DOCD LE1/YR: CPT | Performed by: FAMILY MEDICINE

## 2022-08-23 PROCEDURE — 3044F HG A1C LEVEL LT 7.0%: CPT | Performed by: FAMILY MEDICINE

## 2022-08-23 RX ORDER — SERTRALINE HYDROCHLORIDE 100 MG/1
TABLET, FILM COATED ORAL
Qty: 30 TABLET | Refills: 0 | Status: SHIPPED | OUTPATIENT
Start: 2022-08-23 | End: 2022-09-26 | Stop reason: SDUPTHER

## 2022-08-23 RX ORDER — LISINOPRIL 10 MG/1
10 TABLET ORAL DAILY
Qty: 90 TABLET | Refills: 1 | Status: SHIPPED | OUTPATIENT
Start: 2022-08-23

## 2022-08-23 RX ORDER — METFORMIN HYDROCHLORIDE 500 MG/1
TABLET ORAL
Qty: 90 TABLET | Refills: 1 | Status: SHIPPED | OUTPATIENT
Start: 2022-08-23

## 2022-08-23 RX ORDER — METOPROLOL TARTRATE 25 MG/1
TABLET, FILM COATED ORAL
Qty: 45 TABLET | Refills: 1 | Status: SHIPPED | OUTPATIENT
Start: 2022-08-23

## 2022-08-23 RX ORDER — BUPROPION HYDROCHLORIDE 150 MG/1
150 TABLET ORAL DAILY
Qty: 30 TABLET | Refills: 0 | Status: SHIPPED | OUTPATIENT
Start: 2022-08-23 | End: 2022-09-26 | Stop reason: SDUPTHER

## 2022-08-23 RX ORDER — HYDROCHLOROTHIAZIDE 12.5 MG/1
12.5 TABLET ORAL DAILY
Qty: 90 TABLET | Refills: 1 | Status: SHIPPED | OUTPATIENT
Start: 2022-08-23

## 2022-08-23 NOTE — PROGRESS NOTES
Donte Washington is a [de-identified] y.o. male who presents to the office today with the following:  Chief Complaint   Patient presents with    Depression     Follow up    Annual Wellness Visit              HPI  Depression   taking Sertraline 100 mg now  No SE  Thinks it is helping with being anxiousness  Still somewhat depressed  Is not against increasing dose  Per daughter pt  not socializing much  Now in a routine since pandemic started it  Has social opportunities but does not go and participate  Used to be outgoing, but now stays home  Never had a seizure  Sleeps ok  Wakes up 2 x a night    Sees bright red blood in toilet from stool  Last Colonoscopy years ago  Had hemorrhoids and fissures in the past    No sign of infection otherwise    Hx of DM  Needs refills and recheck of BS    HTN  Needs refill    Hx of Leukocytosis    HM reviewed      Review of Systems   Gastrointestinal:  Positive for blood in stool (in toilet). Genitourinary:  Negative for dysuria, frequency, hematuria and urgency. Psychiatric/Behavioral:  Positive for depression. The patient is nervous/anxious. See HPI. Past Medical History:   Diagnosis Date    Depression     Hypercholesterolemia     Insomnia     Obesity     Peripheral neuropathy     PVC's (premature ventricular contractions)     Type 2 diabetes mellitus treated without insulin ([facility] [address]        Past Surgical History:   Procedure Laterality Date    ENDOSCOPY, COLON, DIAGNOSTIC  2003, 2010    [place] CARDIAC SURG PROCEDURE UNLIST  9/2010    catheterization       No Known Allergies    Current Outpatient Medications   Medication Sig    buPROPion XL (WELLBUTRIN XL) 150 mg tablet Take 1 Tablet by mouth daily. In am  Indications: anxiousness associated with depression    sertraline (ZOLOFT) 100 mg tablet Take one at night  Indications: anxiousness associated with depression    hydroCHLOROthiazide (HYDRODIURIL) 12.5 mg tablet Take 1 Tablet by mouth daily.     metoprolol tartrate (LOPRESSOR) 25 mg tablet Take half once a day    metFORMIN (GLUCOPHAGE) 500 mg tablet Take half bid    lisinopriL (PRINIVIL, ZESTRIL) 10 mg tablet Take 1 Tablet by mouth daily. gabapentin (NEURONTIN) 300 mg capsule Take 1 Capsule by mouth two (2) times a day. Max Daily Amount: 600 mg.    meloxicam (MOBIC) 15 mg tablet Take 15 mg by mouth daily. lovastatin (MEVACOR) 40 mg tablet Take 1 Tab by mouth nightly. ASPIRIN PO Take 81 mg by mouth daily. polyethylene glycol (MIRALAX) 17 gram packet Take 1 Package by mouth as needed. No current facility-administered medications for this visit. Social History     Socioeconomic History    Marital status:     Number of children: 4    Highest education level: Bachelor's degree (e.g., BA, AB, BS)   Tobacco Use    Smoking status: Former     Packs/day: 1.00     Types: Cigarettes     Start date: 1962     Quit date: 2011     Years since quittin.4    Smokeless tobacco: Never   Vaping Use    Vaping Use: Never used   Substance and Sexual Activity    Alcohol use: No    Drug use: Never       Family History   Problem Relation Age of Onset    Heart Disease Mother     Heart Disease Father          Physical Exam:  Visit Vitals  /74 (BP 1 Location: Right upper arm, BP Patient Position: Sitting, BP Cuff Size: Large adult)   Pulse 68   Temp 98.2 °F (36.8 °C)   Resp 12   Ht 6' (1.829 m)   Wt 263 lb (119.3 kg)   SpO2 96%   BMI 35.67 kg/m²     Physical Exam  Vitals and nursing note reviewed. Constitutional:       Appearance: He is obese. HENT:      Head: Normocephalic and atraumatic. Eyes:      Extraocular Movements: Extraocular movements intact. Conjunctiva/sclera: Conjunctivae normal.   Cardiovascular:      Rate and Rhythm: Normal rate and regular rhythm. Heart sounds: Normal heart sounds. Pulmonary:      Effort: Pulmonary effort is normal.   Abdominal:      General: There is no distension. Palpations: Abdomen is soft. Tenderness:  There is no abdominal tenderness. There is no right CVA tenderness, left CVA tenderness or guarding. Genitourinary:     Rectum: Normal. Guaiac result negative. Musculoskeletal:      Right lower leg: No edema. Left lower leg: No edema. Skin:     General: Skin is warm and dry. Neurological:      Mental Status: He is alert and oriented to person, place, and time. Psychiatric:         Mood and Affect: Mood normal.         Behavior: Behavior normal.     No results found for this or any previous visit (from the past 12 hour(s)). No fissure or hemorrhoid seen on anoscopy    Assessment/Plan:    ICD-10-CM ICD-9-CM    1. Medicare annual wellness visit, subsequent  Z00.00 V70.0       2. Anxiety and depression  F41.9 300.00 buPROPion XL (WELLBUTRIN XL) 150 mg tablet    F32. A 311 sertraline (ZOLOFT) 100 mg tablet      3. Blood in stool, cheri  K92.1 578.1 AMB POC FECAL BLOOD, OCCULT, QL 1 CARD      REFERRAL TO GASTROENTEROLOGY      4. Type 2 diabetes mellitus treated without insulin (HCC)  E11.9 250.00 HEMOGLOBIN A1C WITH EAG       DIABETES FOOT EXAM      MICROALBUMIN, UR, RAND W/ MICROALB/CREAT RATIO      metFORMIN (GLUCOPHAGE) 500 mg tablet      MICROALBUMIN, UR, RAND W/ MICROALB/CREAT RATIO      HEMOGLOBIN A1C WITH EAG      5. Leukocytosis, unspecified type  D72.829 288.60 CBC WITH AUTOMATED DIFF      PATHOLOGIST REVIEW SMEARS      CBC WITH AUTOMATED DIFF      6.  Primary hypertension  V39 677.5 METABOLIC PANEL, COMPREHENSIVE      hydroCHLOROthiazide (HYDRODIURIL) 12.5 mg tablet      metoprolol tartrate (LOPRESSOR) 25 mg tablet      lisinopriL (PRINIVIL, ZESTRIL) 10 mg tablet      METABOLIC PANEL, COMPREHENSIVE        Wellbutrin added to Zoloft, take Wellbutrin in am Zoloft in pm  No blood seen in stool from exam, referred to GI for Colonoscopy if cont bleeding    BW and Rx done  Peripheral smear ordered to review Leukocytosis    Recheck in 3-4 w      Char Ho MD

## 2022-08-23 NOTE — PROGRESS NOTES
This is the Subsequent Medicare Annual Wellness Exam, performed 12 months or more after the Initial AWV or the last Subsequent AWV    I have reviewed the patient's medical history in detail and updated the computerized patient record. Assessment/Plan   Education and counseling provided:  Labs drawn in R arm per dr zambrano's orders. Patient tolerated well. 1. Medicare annual wellness visit, subsequent     Depression Risk Factor Screening     3 most recent PHQ Screens 7/7/2022   Little interest or pleasure in doing things More than half the days   Feeling down, depressed, irritable, or hopeless More than half the days   Total Score PHQ 2 4   Trouble falling or staying asleep, or sleeping too much More than half the days   Feeling tired or having little energy More than half the days   Poor appetite, weight loss, or overeating Several days   Feeling bad about yourself - or that you are a failure or have let yourself or your family down Several days   Trouble concentrating on things such as school, work, reading, or watching TV Not at all   Moving or speaking so slowly that other people could have noticed; or the opposite being so fidgety that others notice Several days   Thoughts of being better off dead, or hurting yourself in some way Not at all   PHQ 9 Score 11       Alcohol & Drug Abuse Risk Screen    Do you average more than 1 drink per night or more than 7 drinks a week: No    In the past three months have you have had more than 4 drinks containing alcohol on one occasion: No          Functional Ability and Level of Safety    Hearing: Hearing is good. Activities of Daily Living: The home contains: no safety equipment. Patient does total self care      Ambulation: with no difficulty     Fall Risk:  Fall Risk Assessment, last 12 mths 7/7/2022   Able to walk? Yes   Fall in past 12 months? 1   Do you feel unsteady? -   Are you worried about falling -   Is TUG test greater than 12 seconds?  -   Is the gait abnormal? -   Number of falls in past 12 months 2   Fall with injury? -      Abuse Screen:  Patient is not abused       Cognitive Screening    Has your family/caregiver stated any concerns about your memory: no     Cognitive Screening: na    Health Maintenance Due     Health Maintenance Due   Topic Date Due    Foot Exam Q1  Never done    MICROALBUMIN Q1  Never done    Eye Exam Retinal or Dilated  Never done    DTaP/Tdap/Td series (1 - Tdap) Never done    Shingrix Vaccine Age 50> (1 of 2) Never done    COVID-19 Vaccine (4 - Booster for Trent Mariella series) 02/21/2022       Patient Care Team   Patient Care Team:  Felicitas Collins MD as PCP - General (Family Medicine)  Felicitas Collins MD as PCP - [facility] Empaneled Provider    History     Patient Active Problem List   Diagnosis Code    Hypercholesterolemia E78.00    Insomnia G47.00    Obesity E66.9    Peripheral neuropathy G62.9    PVC's (premature ventricular contractions) I49.3    Anxiety and depression F41.9, F32. A    Type 2 diabetes mellitus treated without insulin ([facility]) E11.9     Past Medical History:   Diagnosis Date    Depression     Hypercholesterolemia     Insomnia     Obesity     Peripheral neuropathy     PVC's (premature ventricular contractions)     Type 2 diabetes mellitus treated without insulin ([facility] [address]       Past Surgical History:   Procedure Laterality Date    ENDOSCOPY, COLON, DIAGNOSTIC  2003, 2010    [place] CARDIAC SURG PROCEDURE UNLIST  9/2010    catheterization     Current Outpatient Medications   Medication Sig Dispense Refill    sertraline (ZOLOFT) 100 mg tablet Take 1 Tablet by mouth daily. 30 Tablet 0    gabapentin (NEURONTIN) 300 mg capsule Take 1 Capsule by mouth two (2) times a day.  Max Daily Amount: 600 mg. 60 Capsule 5    hydroCHLOROthiazide (HYDRODIURIL) 12.5 mg tablet Take 1 tablet by mouth once daily      metoprolol tartrate (LOPRESSOR) 25 mg tablet Take 1/2 (one-half) tablet by mouth once daily      metFORMIN (GLUCOPHAGE) 500 mg tablet Take 1/2 (one-half) tablet by mouth twice daily      lisinopriL (PRINIVIL, ZESTRIL) 10 mg tablet TAKE 1 TABLET BY MOUTH ONCE DAILY      meloxicam (MOBIC) 15 mg tablet Take 15 mg by mouth daily. lovastatin (MEVACOR) 40 mg tablet Take 1 Tab by mouth nightly. 90 Tab 0    ASPIRIN PO Take 81 mg by mouth daily. polyethylene glycol (MIRALAX) 17 gram packet Take 1 Package by mouth as needed.        No Known Allergies    Family History   Problem Relation Age of Onset    Heart Disease Mother     Heart Disease Father      Social History     Tobacco Use    Smoking status: Former     Packs/day: 1.00     Types: Cigarettes     Start date: 1962     Quit date: 2011     Years since quittin.4    Smokeless tobacco: Never   Substance Use Topics    Alcohol use: No         Patricia Kinsey LPN

## 2022-08-23 NOTE — PATIENT INSTRUCTIONS
Medicare Wellness Visit, Male    The best way to live healthy is to have a lifestyle where you eat a well-balanced diet, exercise regularly, limit alcohol use, and quit all forms of tobacco/nicotine, if applicable. Regular preventive services are another way to keep healthy. Preventive services (vaccines, screening tests, monitoring & exams) can help personalize your care plan, which helps you manage your own care. Screening tests can find health problems at the earliest stages, when they are easiest to treat. [facility] follows the current, evidence-based guidelines published by the [facility] ([facility]) when recommending preventive services for our patients. Because we follow these guidelines, sometimes recommendations change over time as research supports it. (For example, a prostate screening blood test is no longer routinely recommended for men with no symptoms). Of course, you and your doctor may decide to screen more often for some diseases, based on your risk and co-morbidities (chronic disease you are already diagnosed with). Preventive services for you include:  - Medicare offers their members a free annual wellness visit, which is time for you and your primary care provider to discuss and plan for your preventive service needs. Take advantage of this benefit every year!  -All adults over age 72 should receive the recommended pneumonia vaccines. Current USPSTF guidelines recommend a series of two vaccines for the best pneumonia protection.   -All adults should have a flu vaccine yearly and tetanus vaccine every 10 years.  -All adults age 48 and older should receive the shingles vaccines (series of two vaccines).        -All adults age 38-68 who are overweight should have a diabetes screening test once every three years.   -Other screening tests & preventive services for persons with diabetes include: an eye exam to screen for diabetic retinopathy, a kidney function test, a foot exam, and stricter control over your cholesterol.   -Cardiovascular screening for adults with routine risk involves an electrocardiogram (ECG) at intervals determined by the provider.   -Colorectal cancer screening should be done for adults age 54-65 with no increased risk factors for colorectal cancer. There are a number of acceptable methods of screening for this type of cancer. Each test has its own benefits and drawbacks. Discuss with your provider what is most appropriate for you during your annual wellness visit. The different tests include: colonoscopy (considered the best screening method), a fecal occult blood test, a fecal DNA test, and sigmoidoscopy.  -All adults born between [facility] should be screened once for Hepatitis C.  -An Abdominal Aortic Aneurysm (AAA) Screening is recommended for men age 73-68 who has ever smoked in their lifetime.      Here is a list of your current Health Maintenance items (your personalized list of preventive services) with a due date:  Health Maintenance Due   Topic Date Due    Diabetic Foot Care  Never done    Albumin Urine Test  Never done    Eye Exam  Never done    DTaP/Tdap/Td  (1 - Tdap) Never done    Shingles Vaccine (1 of 2) Never done    COVID-19 Vaccine (4 - Booster for [place] series) 02/21/2022

## 2022-08-23 NOTE — PROGRESS NOTES
This is the Subsequent Medicare Annual Wellness Exam, performed 12 months or more after the Initial AWV or the last Subsequent AWV    I have reviewed the patient's medical history in detail and updated the computerized patient record. Assessment/Plan   Education and counseling provided:  Are appropriate based on today's review and evaluation    Functional Ability:   Does the patient exhibit a steady gait? {gen no default/yes/free text:829265::\"yes\"}   How long did it take the patient to get up and walk from a sitting position? ***   Is the patient self reliant?  (ie can do own laundry, meals, household chores)  {gen no default/yes/free text:754360::\"yes\"}     Does the patient handle his/her own medications? {gen no default/yes/free text:960786::\"yes\"}     Does the patient handle his/her own money? {gen no default/yes/free text:033088::\"yes\"}     Is the patients home safe (ie good lighting, handrails on stairs and bath, etc.)? {gen no default/yes/free text:911832::\"yes\"}     Did you notice or did patient express any hearing difficulties? {gen no default/yes/free text:423067::\"yes\"}     Did you notice or did patient express any vision difficulties? {gen no default/yes/free text:937361::\"no\"}     Were distance and reading eye charts used? {gen no default/yes/free text:744734::\"yes\"}       Advance Care Planning:   Patient was offered the opportunity to discuss advance care planning:  {gen no default/yes/free text:835773::\"yes\"}     Does patient have an Advance Directive:  {gen no default/yes/free text:013770::\"yes\"}   If no, did you provide information on Caring Connections?   {gen no default/yes/free text:772156::\"yes\"}     ADL Assessment 7/7/2022   Feeding yourself No Help Needed   Getting from bed to chair No Help Needed   Getting dressed No Help Needed   Bathing or showering No Help Needed   Walk across the room (includes cane/walker) No Help Needed   Using the telphone No Help Needed   Taking your medications No Help Needed   Preparing meals No Help Needed   Managing money (expenses/bills) No Help Needed   Moderately strenuous housework (laundry) No Help Needed   Shopping for personal items (toiletries/medicines) No Help Needed   Shopping for groceries No Help Needed   Driving No Help Needed   Climbing a flight of stairs No Help Needed   Getting to places beyond walking distances No Help Needed       Abuse Screening Questionnaire 7/7/2022   Do you ever feel afraid of your partner? N   Are you in a relationship with someone who physically or mentally threatens you? N   Is it safe for you to go home? Y       {There are no diagnoses linked to this encounter. (Refresh or delete this SmartLink)}     Depression Risk Factor Screening     3 most recent PHQ Screens 7/7/2022   Little interest or pleasure in doing things More than half the days   Feeling down, depressed, irritable, or hopeless More than half the days   Total Score PHQ 2 4   Trouble falling or staying asleep, or sleeping too much More than half the days   Feeling tired or having little energy More than half the days   Poor appetite, weight loss, or overeating Several days   Feeling bad about yourself - or that you are a failure or have let yourself or your family down Several days   Trouble concentrating on things such as school, work, reading, or watching TV Not at all   Moving or speaking so slowly that other people could have noticed; or the opposite being so fidgety that others notice Several days   Thoughts of being better off dead, or hurting yourself in some way Not at all   PHQ 9 Score 11       Alcohol & Drug Abuse Risk Screen    Do you average more than 1 drink per night or more than 7 drinks a week: No    In the past three months have you have had more than 4 drinks containing alcohol on one occasion: No          Functional Ability and Level of Safety    Hearing: Hearing is good. Activities of Daily Living:   The home contains: no safety equipment. Patient does total self care      Ambulation: with no difficulty     Fall Risk:  Fall Risk Assessment, last 12 mths 7/7/2022   Able to walk? Yes   Fall in past 12 months? 1   Do you feel unsteady? -   Are you worried about falling -   Is TUG test greater than 12 seconds? -   Is the gait abnormal? -   Number of falls in past 12 months 2   Fall with injury? -      Abuse Screen:  Patient is not abused       Cognitive Screening    Has your family/caregiver stated any concerns about your memory: no     Cognitive Screening: na    Functional Ability:   Does the patient exhibit a steady gait? yes   How long did it take the patient to get up and walk from a sitting position? 5   Is the patient self reliant?  (ie can do own laundry, meals, household chores)  yes     Does the patient handle his/her own medications? yes     Does the patient handle his/her own money? yes     Is the patients home safe (ie good lighting, handrails on stairs and bath, etc.)? yes     Did you notice or did patient express any hearing difficulties? no     Did you notice or did patient express any vision difficulties?   no     Were distance and reading eye charts used? no       Advance Care Planning:   Patient was offered the opportunity to discuss advance care planning:  yes     Does patient have an Advance Directive:  yes   If no, did you provide information on Caring Connections?   yes     ADL Assessment 7/7/2022   Feeding yourself No Help Needed   Getting from bed to chair No Help Needed   Getting dressed No Help Needed   Bathing or showering No Help Needed   Walk across the room (includes cane/walker) No Help Needed   Using the telphone No Help Needed   Taking your medications No Help Needed   Preparing meals No Help Needed   Managing money (expenses/bills) No Help Needed   Moderately strenuous housework (laundry) No Help Needed   Shopping for personal items (toiletries/medicines) No Help Needed   Shopping for groceries No Help Needed   Driving No Help Needed   Climbing a flight of stairs No Help Needed   Getting to places beyond walking distances No Help Needed       Abuse Screening Questionnaire 7/7/2022   Do you ever feel afraid of your partner? N   Are you in a relationship with someone who physically or mentally threatens you? N   Is it safe for you to go home? Y       Health Maintenance Due     Health Maintenance Due   Topic Date Due    Foot Exam Q1  Never done    MICROALBUMIN Q1  Never done    Eye Exam Retinal or Dilated  Never done    DTaP/Tdap/Td series (1 - Tdap) Never done    Shingrix Vaccine Age 50> (1 of 2) Never done    Medicare Yearly Exam  Never done    COVID-19 Vaccine (4 - Booster for [place] series) 02/21/2022       Patient Care Team   Patient Care Team:  Yonathan Stoner MD as PCP - General (Family Medicine)  Ynoathan Stoner MD as PCP - [facility] Empaneled Provider    History     Patient Active Problem List   Diagnosis Code    Hypercholesterolemia E78.00    Insomnia G47.00    Obesity E66.9    Peripheral neuropathy G62.9    PVC's (premature ventricular contractions) I49.3    Anxiety and depression F41.9, F32. A    Type 2 diabetes mellitus treated without insulin ([facility]) E11.9     Past Medical History:   Diagnosis Date    Depression     Hypercholesterolemia     Insomnia     Obesity     Peripheral neuropathy     PVC's (premature ventricular contractions)     Type 2 diabetes mellitus treated without insulin ([facility] [address]       Past Surgical History:   Procedure Laterality Date    ENDOSCOPY, COLON, DIAGNOSTIC  2003, 2010    [facility] PROCEDURE UNLIST  9/2010    catheterization     Current Outpatient Medications   Medication Sig Dispense Refill    sertraline (ZOLOFT) 100 mg tablet Take 1 Tablet by mouth daily. 30 Tablet 0    gabapentin (NEURONTIN) 300 mg capsule Take 1 Capsule by mouth two (2) times a day.  Max Daily Amount: 600 mg. 60 Capsule 5    hydroCHLOROthiazide (HYDRODIURIL) 12.5 mg tablet Take 1 tablet by mouth once daily      metoprolol tartrate (LOPRESSOR) 25 mg tablet Take 1/2 (one-half) tablet by mouth once daily      metFORMIN (GLUCOPHAGE) 500 mg tablet Take 1/2 (one-half) tablet by mouth twice daily      lisinopriL (PRINIVIL, ZESTRIL) 10 mg tablet TAKE 1 TABLET BY MOUTH ONCE DAILY      meloxicam (MOBIC) 15 mg tablet Take 15 mg by mouth daily. lovastatin (MEVACOR) 40 mg tablet Take 1 Tab by mouth nightly. 90 Tab 0    ASPIRIN PO Take 81 mg by mouth daily. polyethylene glycol (MIRALAX) 17 gram packet Take 1 Package by mouth as needed.        No Known Allergies    Family History   Problem Relation Age of Onset    Heart Disease Mother     Heart Disease Father      Social History     Tobacco Use    Smoking status: Former     Packs/day: 1.00     Types: Cigarettes     Start date: 1962     Quit date: 2011     Years since quittin.4    Smokeless tobacco: Never   Substance Use Topics    Alcohol use: No         Jose Mackay LPN

## 2022-08-24 LAB
ALBUMIN SERPL-MCNC: 3.7 G/DL (ref 3.5–5)
ALBUMIN/GLOB SERPL: 1 {RATIO} (ref 1.1–2.2)
ALP SERPL-CCNC: 54 U/L (ref 45–117)
ALT SERPL-CCNC: 19 U/L (ref 12–78)
ANION GAP SERPL CALC-SCNC: 7 MMOL/L (ref 5–15)
AST SERPL-CCNC: 16 U/L (ref 15–37)
BASOPHILS # BLD: 0.1 K/UL (ref 0–0.1)
BASOPHILS NFR BLD: 1 % (ref 0–1)
BILIRUB SERPL-MCNC: 0.4 MG/DL (ref 0.2–1)
BUN SERPL-MCNC: 17 MG/DL (ref 6–20)
BUN/CREAT SERPL: 17 (ref 12–20)
CALCIUM SERPL-MCNC: 9.5 MG/DL (ref 8.5–10.1)
CHLORIDE SERPL-SCNC: 107 MMOL/L (ref 97–108)
CO2 SERPL-SCNC: 28 MMOL/L (ref 21–32)
CREAT SERPL-MCNC: 1.02 MG/DL (ref 0.7–1.3)
CREAT UR-MCNC: 85.4 MG/DL
DIFFERENTIAL METHOD BLD: ABNORMAL
EOSINOPHIL # BLD: 0.4 K/UL (ref 0–0.4)
EOSINOPHIL NFR BLD: 3 % (ref 0–7)
ERYTHROCYTE [DISTWIDTH] IN BLOOD BY AUTOMATED COUNT: 13.6 % (ref 11.5–14.5)
EST. AVERAGE GLUCOSE BLD GHB EST-MCNC: 128 MG/DL
GLOBULIN SER CALC-MCNC: 3.6 G/DL (ref 2–4)
GLUCOSE SERPL-MCNC: 97 MG/DL (ref 65–100)
HBA1C MFR BLD: 6.1 % (ref 4–5.6)
HCT VFR BLD AUTO: 40.2 % (ref 36.6–50.3)
HEMOCCULT STL QL: NEGATIVE
HGB BLD-MCNC: 13.3 G/DL (ref 12.1–17)
IMM GRANULOCYTES # BLD AUTO: 0 K/UL (ref 0–0.04)
IMM GRANULOCYTES NFR BLD AUTO: 0 % (ref 0–0.5)
LYMPHOCYTES # BLD: 3.2 K/UL (ref 0.8–3.5)
LYMPHOCYTES NFR BLD: 27 % (ref 12–49)
MCH RBC QN AUTO: 32.1 PG (ref 26–34)
MCHC RBC AUTO-ENTMCNC: 33.1 G/DL (ref 30–36.5)
MCV RBC AUTO: 97.1 FL (ref 80–99)
MICROALBUMIN UR-MCNC: 0.92 MG/DL
MICROALBUMIN/CREAT UR-RTO: 11 MG/G (ref 0–30)
MONOCYTES # BLD: 0.7 K/UL (ref 0–1)
MONOCYTES NFR BLD: 6 % (ref 5–13)
NEUTS SEG # BLD: 7.4 K/UL (ref 1.8–8)
NEUTS SEG NFR BLD: 63 % (ref 32–75)
NRBC # BLD: 0 K/UL (ref 0–0.01)
NRBC BLD-RTO: 0 PER 100 WBC
PLATELET # BLD AUTO: 233 K/UL (ref 150–400)
PMV BLD AUTO: 10.9 FL (ref 8.9–12.9)
POTASSIUM SERPL-SCNC: 4.5 MMOL/L (ref 3.5–5.1)
PROT SERPL-MCNC: 7.3 G/DL (ref 6.4–8.2)
RBC # BLD AUTO: 4.14 M/UL (ref 4.1–5.7)
SODIUM SERPL-SCNC: 142 MMOL/L (ref 136–145)
VALID INTERNAL CONTROL?: YES
WBC # BLD AUTO: 11.8 K/UL (ref 4.1–11.1)

## 2022-08-25 NOTE — PROGRESS NOTES
Call pt and daughter, the CBC is again showing an elevated WBC count  We are still awaiting the peripheral smear  No sign of anemia  The urine micro albumin test is normal  The electrolytes, kidney and liver tests are normal  The HbA1C is 6.1, in the prediabetes range, very good,   cont current meds

## 2022-08-29 LAB
BASOPHILS # BLD AUTO: 0.1 X10E3/UL (ref 0–0.2)
BASOPHILS NFR BLD AUTO: 1 %
EOSINOPHIL # BLD AUTO: 0.4 X10E3/UL (ref 0–0.4)
EOSINOPHIL NFR BLD AUTO: 4 %
ERYTHROCYTE [DISTWIDTH] IN BLOOD BY AUTOMATED COUNT: 13.4 % (ref 11.6–15.4)
HCT VFR BLD AUTO: 40.1 % (ref 37.5–51)
HGB BLD-MCNC: 13.7 G/DL (ref 13–17.7)
IMM GRANULOCYTES # BLD AUTO: 0 X10E3/UL (ref 0–0.1)
IMM GRANULOCYTES NFR BLD AUTO: 0 %
LYMPHOCYTES # BLD AUTO: 3.1 X10E3/UL (ref 0.7–3.1)
LYMPHOCYTES NFR BLD AUTO: 27 %
MCH RBC QN AUTO: 32.1 PG (ref 26.6–33)
MCHC RBC AUTO-ENTMCNC: 34.2 G/DL (ref 31.5–35.7)
MCV RBC AUTO: 94 FL (ref 79–97)
MONOCYTES # BLD AUTO: 0.7 X10E3/UL (ref 0.1–0.9)
MONOCYTES NFR BLD AUTO: 6 %
NEUTROPHILS # BLD AUTO: 6.9 X10E3/UL (ref 1.4–7)
NEUTROPHILS NFR BLD AUTO: 62 %
PATH INTERP BLD-IMP: ABNORMAL
PATH REV BLD -IMP: ABNORMAL
PATHOLOGIST NAME: ABNORMAL
PLATELET # BLD AUTO: 244 X10E3/UL (ref 150–450)
RBC # BLD AUTO: 4.27 X10E6/UL (ref 4.14–5.8)
WBC # BLD AUTO: 11.1 X10E3/UL (ref 3.4–10.8)

## 2022-09-01 ENCOUNTER — TELEPHONE (OUTPATIENT)
Dept: FAMILY MEDICINE CLINIC | Age: 82
End: 2022-09-01

## 2022-09-01 NOTE — TELEPHONE ENCOUNTER
----- Message from Silvia Arias sent at 8/30/2022  9:13 AM EDT -----  Subject: Message to Provider    QUESTIONS  Information for Provider? Pt stated that the  told him to   wait 4 days to call the gastro office that he was referred to. Pt   scheduled an appt with Dr. Al Bai on 11/13 and wants to make sure that   it is okay or if he needs to be seen sooner. Pls give the pt a call before   noon. ---------------------------------------------------------------------------  --------------  Susy Morris MRUZ  [phone]; Do not leave any message, patient will call back for answer  ---------------------------------------------------------------------------  --------------  SCRIPT ANSWERS  Relationship to Patient?  Self

## 2022-09-01 NOTE — TELEPHONE ENCOUNTER
I have called and talked to this patient. Per Dr De La Garza Letters, it is fine to see Dr Jon Gonzalez in November unless he develops increased bleeding.

## 2022-09-12 ENCOUNTER — HOSPITAL ENCOUNTER (EMERGENCY)
Age: 82
Discharge: HOME | End: 2022-09-12
Attending: EMERGENCY MEDICINE
Payer: MEDICARE

## 2022-09-12 VITALS
HEART RATE: 58 BPM | SYSTOLIC BLOOD PRESSURE: 127 MMHG | RESPIRATION RATE: 16 BRPM | OXYGEN SATURATION: 96 % | WEIGHT: 263 LBS | TEMPERATURE: 98.2 F | HEIGHT: 72 IN | BODY MASS INDEX: 35.62 KG/M2 | DIASTOLIC BLOOD PRESSURE: 39 MMHG

## 2022-09-12 DIAGNOSIS — E86.0 DEHYDRATION: Primary | ICD-10-CM

## 2022-09-12 DIAGNOSIS — I95.1 ORTHOSTASIS: ICD-10-CM

## 2022-09-12 DIAGNOSIS — N17.9 AKI (ACUTE KIDNEY INJURY) (HCC): ICD-10-CM

## 2022-09-12 LAB
ALBUMIN SERPL-MCNC: 3.4 G/DL (ref 3.5–5)
ALBUMIN/GLOB SERPL: 0.9 {RATIO} (ref 1.1–2.2)
ALP SERPL-CCNC: 53 U/L (ref 45–117)
ALT SERPL-CCNC: 10 U/L (ref 12–78)
ANION GAP SERPL CALC-SCNC: 6 MMOL/L (ref 5–15)
AST SERPL-CCNC: 13 U/L (ref 15–37)
ATRIAL RATE: 54 BPM
ATRIAL RATE: 54 BPM
BASOPHILS # BLD: 0.1 K/UL (ref 0–0.1)
BASOPHILS NFR BLD: 1 % (ref 0–1)
BILIRUB SERPL-MCNC: 0.6 MG/DL (ref 0.2–1)
BUN SERPL-MCNC: 28 MG/DL (ref 6–20)
BUN/CREAT SERPL: 21 (ref 12–20)
CALCIUM SERPL-MCNC: 9.3 MG/DL (ref 8.5–10.1)
CALCULATED P AXIS, ECG09: 138 DEGREES
CALCULATED P AXIS, ECG09: 151 DEGREES
CALCULATED R AXIS, ECG10: -26 DEGREES
CALCULATED R AXIS, ECG10: -27 DEGREES
CALCULATED T AXIS, ECG11: -68 DEGREES
CALCULATED T AXIS, ECG11: 131 DEGREES
CHLORIDE SERPL-SCNC: 102 MMOL/L (ref 97–108)
CO2 SERPL-SCNC: 30 MMOL/L (ref 21–32)
CREAT SERPL-MCNC: 1.35 MG/DL (ref 0.7–1.3)
DIAGNOSIS, 93000: NORMAL
DIAGNOSIS, 93000: NORMAL
DIFFERENTIAL METHOD BLD: ABNORMAL
EOSINOPHIL # BLD: 0.3 K/UL (ref 0–0.4)
EOSINOPHIL NFR BLD: 3 % (ref 0–7)
ERYTHROCYTE [DISTWIDTH] IN BLOOD BY AUTOMATED COUNT: 13.9 % (ref 11.5–14.5)
GLOBULIN SER CALC-MCNC: 3.6 G/DL (ref 2–4)
GLUCOSE SERPL-MCNC: 95 MG/DL (ref 65–100)
HCT VFR BLD AUTO: 37.6 % (ref 36.6–50.3)
HGB BLD-MCNC: 12.3 G/DL (ref 12.1–17)
IMM GRANULOCYTES # BLD AUTO: 0 K/UL (ref 0–0.04)
IMM GRANULOCYTES NFR BLD AUTO: 0 % (ref 0–0.5)
LYMPHOCYTES # BLD: 2.4 K/UL (ref 0.8–3.5)
LYMPHOCYTES NFR BLD: 23 % (ref 12–49)
MAGNESIUM SERPL-MCNC: 1.9 MG/DL (ref 1.6–2.4)
MCH RBC QN AUTO: 32.3 PG (ref 26–34)
MCHC RBC AUTO-ENTMCNC: 32.7 G/DL (ref 30–36.5)
MCV RBC AUTO: 98.7 FL (ref 80–99)
MONOCYTES # BLD: 0.8 K/UL (ref 0–1)
MONOCYTES NFR BLD: 8 % (ref 5–13)
NEUTS SEG # BLD: 6.6 K/UL (ref 1.8–8)
NEUTS SEG NFR BLD: 65 % (ref 32–75)
NRBC # BLD: 0 K/UL (ref 0–0.01)
NRBC BLD-RTO: 0 PER 100 WBC
P-R INTERVAL, ECG05: 200 MS
P-R INTERVAL, ECG05: 208 MS
PLATELET # BLD AUTO: 226 K/UL (ref 150–400)
PMV BLD AUTO: 10.1 FL (ref 8.9–12.9)
POTASSIUM SERPL-SCNC: 4.5 MMOL/L (ref 3.5–5.1)
PROT SERPL-MCNC: 7 G/DL (ref 6.4–8.2)
Q-T INTERVAL, ECG07: 410 MS
Q-T INTERVAL, ECG07: 418 MS
QRS DURATION, ECG06: 94 MS
QRS DURATION, ECG06: 94 MS
QTC CALCULATION (BEZET), ECG08: 388 MS
QTC CALCULATION (BEZET), ECG08: 396 MS
RBC # BLD AUTO: 3.81 M/UL (ref 4.1–5.7)
SODIUM SERPL-SCNC: 138 MMOL/L (ref 136–145)
VENTRICULAR RATE, ECG03: 54 BPM
VENTRICULAR RATE, ECG03: 54 BPM
WBC # BLD AUTO: 10.2 K/UL (ref 4.1–11.1)

## 2022-09-12 PROCEDURE — 99284 EMERGENCY DEPT VISIT MOD MDM: CPT

## 2022-09-12 PROCEDURE — 36415 COLL VENOUS BLD VENIPUNCTURE: CPT

## 2022-09-12 PROCEDURE — 85025 COMPLETE CBC W/AUTO DIFF WBC: CPT

## 2022-09-12 PROCEDURE — 74011250636 HC RX REV CODE- 250/636: Performed by: EMERGENCY MEDICINE

## 2022-09-12 PROCEDURE — 80053 COMPREHEN METABOLIC PANEL: CPT

## 2022-09-12 PROCEDURE — 96360 HYDRATION IV INFUSION INIT: CPT

## 2022-09-12 PROCEDURE — 96361 HYDRATE IV INFUSION ADD-ON: CPT

## 2022-09-12 PROCEDURE — 93005 ELECTROCARDIOGRAM TRACING: CPT

## 2022-09-12 PROCEDURE — 83735 ASSAY OF MAGNESIUM: CPT

## 2022-09-12 RX ORDER — SODIUM CHLORIDE 0.9 % (FLUSH) 0.9 %
5-10 SYRINGE (ML) INJECTION ONCE
Status: DISCONTINUED | OUTPATIENT
Start: 2022-09-12 | End: 2022-09-12 | Stop reason: HOSPADM

## 2022-09-12 RX ADMIN — SODIUM CHLORIDE 1000 ML: 9 INJECTION, SOLUTION INTRAVENOUS at 15:49

## 2022-09-12 NOTE — ED PROVIDER NOTES
EMERGENCY DEPARTMENT HISTORY AND PHYSICAL EXAM          Date: 9/12/2022  Patient Name: Shari Krabbe    History of Presenting Illness     Chief Complaint   Patient presents with    Slow Heart Rate       History Provided By: Patient    HPI: Shari Krabbe is a [de-identified] y.o. male, pmhx high cholesterol, diabetes, PVCs, who presents ambulatory to the ED c/o low blood pressure    Patient states he does not really know why he is here or what happened today. He was feeling a little lightheaded while he was walking into his daughter's work. She looked at him and thought he looked purple so she sat him down and his vital signs were checked. They noticed his blood pressure dropped from 120-100 and his heart rate was 45 so they brought him to the ER for evaluation. Patient states he felt a little lightheaded when he was walking to her office. Patient denies any headache, dizziness currently, neck pain, chest pain, shortness of breath, abdominal pain as well as any nausea, vomiting and diarrhea. He states he is eating well urinating well and stooling without problem. He admits he does not drink much maybe 1 or 2 glasses of water a day. Daughter notes that she is concerned that his cognitive and physical conditioning are declining as he has been isolating himself and has not been going in for exercises    PCP: Jamilah Lopez MD    Allergies: None known    There are no other complaints, changes, or physical findings at this time. Current Facility-Administered Medications   Medication Dose Route Frequency Provider Last Rate Last Admin    sodium chloride (NS) flush 5-10 mL  5-10 mL IntraVENous ONCE Termeer, Esetla Vásquez MD         Current Outpatient Medications   Medication Sig Dispense Refill    buPROPion XL (WELLBUTRIN XL) 150 mg tablet Take 1 Tablet by mouth daily.  In am  Indications: anxiousness associated with depression 30 Tablet 0    sertraline (ZOLOFT) 100 mg tablet Take one at night  Indications: anxiousness associated with depression 30 Tablet 0    hydroCHLOROthiazide (HYDRODIURIL) 12.5 mg tablet Take 1 Tablet by mouth daily. 90 Tablet 1    metoprolol tartrate (LOPRESSOR) 25 mg tablet Take half once a day 45 Tablet 1    metFORMIN (GLUCOPHAGE) 500 mg tablet Take half bid 90 Tablet 1    lisinopriL (PRINIVIL, ZESTRIL) 10 mg tablet Take 1 Tablet by mouth daily. 90 Tablet 1    polyethylene glycol (MIRALAX) 17 gram packet Take 1 Package by mouth as needed. gabapentin (NEURONTIN) 300 mg capsule Take 1 Capsule by mouth two (2) times a day. Max Daily Amount: 600 mg. 60 Capsule 5    meloxicam (MOBIC) 15 mg tablet Take 15 mg by mouth daily. lovastatin (MEVACOR) 40 mg tablet Take 1 Tab by mouth nightly. 90 Tab 0    ASPIRIN PO Take 81 mg by mouth daily. Past History     Past Medical History:  Past Medical History:   Diagnosis Date    Depression     Hypercholesterolemia     Insomnia     Obesity     Peripheral neuropathy     PVC's (premature ventricular contractions)     Type 2 diabetes mellitus treated without insulin ([facility])        Past Surgical History:  Past Surgical History:   Procedure Laterality Date    ENDOSCOPY, COLON, DIAGNOSTIC  ,     [place] CARDIAC SURG PROCEDURE UNLIST  2010    catheterization       Family History:  Family History   Problem Relation Age of Onset    Heart Disease Mother     Heart Disease Father        Social History:  Social History     Tobacco Use    Smoking status: Former     Packs/day: 1.00     Types: Cigarettes     Start date: 1962     Quit date: 2011     Years since quittin.5    Smokeless tobacco: Never   Vaping Use    Vaping Use: Never used   Substance Use Topics    Alcohol use: No    Drug use: Never       Allergies:  No Known Allergies      Review of Systems   Review of Systems   Constitutional:  Negative for activity change, appetite change, chills, fever and unexpected weight change. HENT:  Negative for congestion.     Eyes:  Negative for pain and visual disturbance. Respiratory:  Negative for cough and shortness of breath. Cardiovascular:  Negative for chest pain. Gastrointestinal:  Negative for abdominal pain, diarrhea, nausea and vomiting. Genitourinary:  Negative for dysuria. Musculoskeletal:  Negative for back pain. Skin:  Negative for rash. Neurological:  Positive for dizziness. Negative for headaches. Physical Exam   Physical Exam  Vitals and nursing note reviewed. Constitutional:       Appearance: He is well-developed. He is not diaphoretic. Comments: Obese elderly male sitting comfortably in minimal acute distress   HENT:      Head: Normocephalic and atraumatic. Eyes:      General:         Right eye: No discharge. Left eye: No discharge. Conjunctiva/sclera: Conjunctivae normal.      Pupils: Pupils are equal, round, and reactive to light. Cardiovascular:      Rate and Rhythm: Regular rhythm. Bradycardia present. Heart sounds: Normal heart sounds. No murmur heard. No friction rub. No gallop. Pulmonary:      Effort: Pulmonary effort is normal. No respiratory distress. Breath sounds: Normal breath sounds. No wheezing or rales. Abdominal:      General: Bowel sounds are normal. There is no distension. Palpations: Abdomen is soft. Tenderness: There is no abdominal tenderness. Musculoskeletal:         General: Normal range of motion. Cervical back: Normal range of motion and neck supple. Right lower leg: No edema. Left lower leg: No edema. Skin:     General: Skin is warm and dry. Coloration: Skin is not pale. Findings: No erythema or rash. Neurological:      Mental Status: He is alert and oriented to person, place, and time. Cranial Nerves: No cranial nerve deficit. Motor: No abnormal muscle tone.      Diagnostic Study Results     Labs -     Recent Results (from the past 12 hour(s))   CBC WITH AUTOMATED DIFF    Collection Time: 09/12/22  3:40 PM Result Value Ref Range    WBC 10.2 4.1 - 11.1 K/uL    RBC 3.81 (L) 4.10 - 5.70 M/uL    HGB 12.3 12.1 - 17.0 g/dL    HCT 37.6 36.6 - 50.3 %    MCV 98.7 80.0 - 99.0 FL    MCH 32.3 26.0 - 34.0 PG    MCHC 32.7 30.0 - 36.5 g/dL    RDW 13.9 11.5 - 14.5 %    PLATELET 649 558 - 304 K/uL    MPV 10.1 8.9 - 12.9 FL    NRBC 0.0 0  WBC    ABSOLUTE NRBC 0.00 0.00 - 0.01 K/uL    NEUTROPHILS 65 32 - 75 %    LYMPHOCYTES 23 12 - 49 %    MONOCYTES 8 5 - 13 %    EOSINOPHILS 3 0 - 7 %    BASOPHILS 1 0 - 1 %    IMMATURE GRANULOCYTES 0 0.0 - 0.5 %    ABS. NEUTROPHILS 6.6 1.8 - 8.0 K/UL    ABS. LYMPHOCYTES 2.4 0.8 - 3.5 K/UL    ABS. MONOCYTES 0.8 0.0 - 1.0 K/UL    ABS. EOSINOPHILS 0.3 0.0 - 0.4 K/UL    ABS. BASOPHILS 0.1 0.0 - 0.1 K/UL    ABS. IMM. GRANS. 0.0 0.00 - 0.04 K/UL    DF AUTOMATED     METABOLIC PANEL, COMPREHENSIVE    Collection Time: 09/12/22  3:40 PM   Result Value Ref Range    Sodium 138 136 - 145 mmol/L    Potassium 4.5 3.5 - 5.1 mmol/L    Chloride 102 97 - 108 mmol/L    CO2 30 21 - 32 mmol/L    Anion gap 6 5 - 15 mmol/L    Glucose 95 65 - 100 mg/dL    BUN 28 (H) 6 - 20 MG/DL    Creatinine 1.35 (H) 0.70 - 1.30 MG/DL    BUN/Creatinine ratio 21 (H) 12 - 20      GFR est AA >60 >60 ml/min/1.73m2    GFR est non-AA 51 (L) >60 ml/min/1.73m2    Calcium 9.3 8.5 - 10.1 MG/DL    Bilirubin, total 0.6 0.2 - 1.0 MG/DL    ALT (SGPT) 10 (L) 12 - 78 U/L    AST (SGOT) 13 (L) 15 - 37 U/L    Alk. phosphatase 53 45 - 117 U/L    Protein, total 7.0 6.4 - 8.2 g/dL    Albumin 3.4 (L) 3.5 - 5.0 g/dL    Globulin 3.6 2.0 - 4.0 g/dL    A-G Ratio 0.9 (L) 1.1 - 2.2     MAGNESIUM    Collection Time: 09/12/22  3:40 PM   Result Value Ref Range    Magnesium 1.9 1.6 - 2.4 mg/dL       Radiologic Studies -   No orders to display     CT Results  (Last 48 hours)      None          CXR Results  (Last 48 hours)      None              Medical Decision Making   I am the first provider for this patient.     I reviewed the vital signs, available nursing notes, past medical history, past surgical history, family history and social history. Vital Signs-Reviewed the patient's vital signs. Patient Vitals for the past 12 hrs:   Temp Pulse Resp BP SpO2   09/12/22 1558 -- (!) 51 -- (!) 128/41 --   09/12/22 1503 98.2 °F (36.8 °C) (!) 56 19 (!) 126/39 96 %       Pulse Oximetry Analysis - 96% on RA    Cardiac Monitor:   Rate: 52bpm  Rhythm: Sinus bradycardia    Records Reviewed: Nursing Notes, Old Medical Records, Previous Radiology Studies, and Previous Laboratory Studies    Provider Notes (Medical Decision Making):   MDM: The male presenting from local facility where he was visiting his daughter in . The local nurse checked his vital signs and noted his blood pressure to be low and his blood pressure to drop when he stood. Daughter thought he looked pale and patient felt a little lightheaded. On arrival to the ER his symptoms have mostly resolved. Neurologic exam without focality and patient able to walk into the room from triage without ataxia. Low suspicion for posterior stroke. ED Course:   Initial assessment performed. The patients presenting problems have been discussed, and they are in agreement with the care plan formulated and outlined with them. I have encouraged them to ask questions as they arise throughout their visit. EKG interpretation: (Preliminary)  Rhythm: Appears to be in sinus rhythm with a borderline prolonged WA at 2 8 ms; normal QRS; normal QTC with left axis deviation. Low voltage noted. This EKG was interpreted by ED Provider Rolo Garner MD    PROGRESS NOTE:  ED Course as of 09/12/22 1715   Mon Sep 12, 2022   1611 Patient's orthostatics without any significant changes upon standing. Labs reviewed and notable for LILLIAN. Continue IV fluid hydration. [JT]   1706 Ambulated with patient up and down the hallway. When he uses his cane which she just recently got it tripped him and he falls to the side.   When he ambulates without the cane he does not have any evidence of ataxia. He is not dizzy and is able to stand without difficulty. We discussed his declining in his mental and physical state which his daughter was concerned about. Recommend that he go 3 times per week to exercise away his daughter and set it up for him. I would also like him to follow-up with his primary care doctor this week to recheck his gait and his balance and his symptoms. [JT]      ED Course User Index  [JT] Pau Singer MD        Discharge note:  Pt re-evaluated and noted to be feeling better, ready for discharge. Updated pt on all final lab findings. Will follow up as instructed. All questions have been answered, pt voiced understanding and agreement with plan. Specific return precautions provided as well as instructions to return to the ED should sx worsen at any time. Vital signs stable for discharge. Critical Care Time:   0      Diagnosis     Clinical Impression:   1. Dehydration    2. Orthostasis    3. LILLIAN (acute kidney injury) ([facility] [address]        PLAN:  1. Current Discharge Medication List        2. Follow-up Information       Follow up With Specialties Details Why Contact Info    Roberto Gilmore MD Family Medicine Schedule an appointment as soon as possible for a visit  For symptom recheck this week [address] [facility] [address]  [phone]      [address] [facility] Emergency Medicine  If symptoms worsen [address] [phone]          Return to ED if worse     Disposition:  Home       Please note, this dictation was completed with [employer], the computer voice recognition software. Quite often unanticipated grammatical, syntax, homophones, and other interpretive errors are inadvertently transcribed by the computer software. Please disregard these errors. Please excuse any errors that have escaped final proof reading.

## 2022-09-26 ENCOUNTER — OFFICE VISIT (OUTPATIENT)
Dept: FAMILY MEDICINE CLINIC | Age: 82
End: 2022-09-26
Payer: MEDICARE

## 2022-09-26 VITALS
TEMPERATURE: 98.2 F | SYSTOLIC BLOOD PRESSURE: 126 MMHG | HEART RATE: 70 BPM | BODY MASS INDEX: 34.27 KG/M2 | RESPIRATION RATE: 12 BRPM | HEIGHT: 72 IN | WEIGHT: 253 LBS | DIASTOLIC BLOOD PRESSURE: 63 MMHG | OXYGEN SATURATION: 95 %

## 2022-09-26 DIAGNOSIS — F41.9 ANXIETY AND DEPRESSION: Primary | ICD-10-CM

## 2022-09-26 DIAGNOSIS — S80.12XA HEMATOMA OF LEFT LOWER LEG: ICD-10-CM

## 2022-09-26 DIAGNOSIS — I10 PRIMARY HYPERTENSION: ICD-10-CM

## 2022-09-26 DIAGNOSIS — F32.A ANXIETY AND DEPRESSION: Primary | ICD-10-CM

## 2022-09-26 DIAGNOSIS — Z23 ENCOUNTER FOR IMMUNIZATION: ICD-10-CM

## 2022-09-26 PROCEDURE — G9717 DOC PT DX DEP/BP F/U NT REQ: HCPCS | Performed by: FAMILY MEDICINE

## 2022-09-26 PROCEDURE — G8417 CALC BMI ABV UP PARAM F/U: HCPCS | Performed by: FAMILY MEDICINE

## 2022-09-26 PROCEDURE — G0008 ADMIN INFLUENZA VIRUS VAC: HCPCS | Performed by: FAMILY MEDICINE

## 2022-09-26 PROCEDURE — G8536 NO DOC ELDER MAL SCRN: HCPCS | Performed by: FAMILY MEDICINE

## 2022-09-26 PROCEDURE — 1101F PT FALLS ASSESS-DOCD LE1/YR: CPT | Performed by: FAMILY MEDICINE

## 2022-09-26 PROCEDURE — 99214 OFFICE O/P EST MOD 30 MIN: CPT | Performed by: FAMILY MEDICINE

## 2022-09-26 PROCEDURE — G8427 DOCREV CUR MEDS BY ELIG CLIN: HCPCS | Performed by: FAMILY MEDICINE

## 2022-09-26 PROCEDURE — 90694 VACC AIIV4 NO PRSRV 0.5ML IM: CPT | Performed by: FAMILY MEDICINE

## 2022-09-26 PROCEDURE — 1123F ACP DISCUSS/DSCN MKR DOCD: CPT | Performed by: FAMILY MEDICINE

## 2022-09-26 RX ORDER — DIAZEPAM 5 MG/1
TABLET ORAL
COMMUNITY
Start: 2022-08-09

## 2022-09-26 RX ORDER — BUPROPION HYDROCHLORIDE 150 MG/1
150 TABLET ORAL DAILY
Qty: 90 TABLET | Refills: 1 | Status: SHIPPED | OUTPATIENT
Start: 2022-09-26

## 2022-09-26 RX ORDER — SERTRALINE HYDROCHLORIDE 100 MG/1
TABLET, FILM COATED ORAL
Qty: 90 TABLET | Refills: 1 | Status: SHIPPED | OUTPATIENT
Start: 2022-09-26

## 2022-09-26 NOTE — PROGRESS NOTES
Katerin Hong is a [de-identified] y.o. male who presents to the office today with the following:  Chief Complaint   Patient presents with    Hypotension     [facility] ED       HPI  HTN  No dizzy spells now at home  Taking 3 BP meds  BP is good    On Zoloft and Wellbutrin now  Needs refills  No SE with meds  Helping  Started his water walking and liking it  And going out more    Had bruise on left thumb and left low leg  Does not recall injury  On ASA and Mobic    Had Leukocytosis  Path review ok, no abnormal cells      Review of Systems   HENT:  Negative for congestion. Respiratory:  Negative for cough. Cardiovascular:  Negative for chest pain. Endo/Heme/Allergies:  Bruises/bleeds easily. Psychiatric/Behavioral:  Negative for depression. See HPI. Past Medical History:   Diagnosis Date    Depression     Hypercholesterolemia     Insomnia     Obesity     Peripheral neuropathy     PVC's (premature ventricular contractions)     Type 2 diabetes mellitus treated without insulin ([facility] [address]        Past Surgical History:   Procedure Laterality Date    ENDOSCOPY, COLON, DIAGNOSTIC  2003, 2010    [place] CARDIAC SURG PROCEDURE UNLIST  9/2010    catheterization       No Known Allergies    Current Outpatient Medications   Medication Sig    buPROPion XL (WELLBUTRIN XL) 150 mg tablet Take 1 Tablet by mouth daily. In am  Indications: anxiousness associated with depression    sertraline (ZOLOFT) 100 mg tablet Take one at night  Indications: anxiousness associated with depression    hydroCHLOROthiazide (HYDRODIURIL) 12.5 mg tablet Take 1 Tablet by mouth daily. metoprolol tartrate (LOPRESSOR) 25 mg tablet Take half once a day    metFORMIN (GLUCOPHAGE) 500 mg tablet Take half bid    lisinopriL (PRINIVIL, ZESTRIL) 10 mg tablet Take 1 Tablet by mouth daily. polyethylene glycol (MIRALAX) 17 gram packet Take 1 Package by mouth as needed. gabapentin (NEURONTIN) 300 mg capsule Take 1 Capsule by mouth two (2) times a day.  Max Daily Amount: 600 mg.    meloxicam (MOBIC) 15 mg tablet Take 15 mg by mouth daily. lovastatin (MEVACOR) 40 mg tablet Take 1 Tab by mouth nightly. ASPIRIN PO Take 81 mg by mouth daily. diazePAM (VALIUM) 5 mg tablet TAKE 1 TABLET BY MOUTH THE NIGHT BEFORE DENTAL VISIT AND THEN TAKE 1 TABLET 1 HOUR BEFORE DENTAL VISIT     No current facility-administered medications for this visit. Social History     Socioeconomic History    Marital status:     Number of children: 4    Highest education level: Bachelor's degree (e.g., BA, AB, BS)   Tobacco Use    Smoking status: Former     Packs/day: 1.00     Types: Cigarettes     Start date: 1962     Quit date: 2011     Years since quittin.5    Smokeless tobacco: Never   Vaping Use    Vaping Use: Never used   Substance and Sexual Activity    Alcohol use: No    Drug use: Never       Family History   Problem Relation Age of Onset    Heart Disease Mother     Heart Disease Father          Physical Exam:  Visit Vitals  /63 (BP 1 Location: Right upper arm, BP Patient Position: Sitting, BP Cuff Size: Adult)   Pulse 70   Temp 98.2 °F (36.8 °C)   Resp 12   Ht 6' (1.829 m)   Wt 253 lb (114.8 kg)   SpO2 95%   BMI 34.31 kg/m²     Physical Exam  Vitals and nursing note reviewed. Constitutional:       Appearance: He is obese. HENT:      Head: Normocephalic and atraumatic. Eyes:      Extraocular Movements: Extraocular movements intact. Conjunctiva/sclera: Conjunctivae normal.   Cardiovascular:      Rate and Rhythm: Normal rate and regular rhythm. Heart sounds: Normal heart sounds. Pulmonary:      Effort: Pulmonary effort is normal.      Breath sounds: Normal breath sounds. Musculoskeletal:      Right lower leg: No edema. Left lower leg: Edema present. Skin:     Findings: Bruising present. Comments: Hematoma left medial low leg   Neurological:      Mental Status: He is alert and oriented to person, place, and time.    Psychiatric: Mood and Affect: Mood normal.         Behavior: Behavior normal.       Assessment/Plan:    ICD-10-CM ICD-9-CM    1. Anxiety and depression  F41.9 300.00 buPROPion XL (WELLBUTRIN XL) 150 mg tablet    F32. A 311 sertraline (ZOLOFT) 100 mg tablet      2. Primary hypertension  I10 401.9 Cont current meds      3. Hematoma of left lower leg  S80. 12XA 924.10 Apply heat for hematoma to resolve      4.  Encounter for immunization  Z23 V03.89 INFLUENZA, FLUAD, (AGE 72 Y+), IM, PF, 0.5 ML              David Morales MD

## 2022-10-14 DIAGNOSIS — E11.42 DIABETIC PERIPHERAL NEUROPATHY ASSOCIATED WITH TYPE 2 DIABETES MELLITUS (HCC): ICD-10-CM

## 2022-10-16 RX ORDER — GABAPENTIN 300 MG/1
CAPSULE ORAL
Qty: 60 CAPSULE | Refills: 0 | Status: SHIPPED | OUTPATIENT
Start: 2022-10-16

## 2022-10-24 ENCOUNTER — CLINICAL SUPPORT (OUTPATIENT)
Dept: FAMILY MEDICINE CLINIC | Age: 82
End: 2022-10-24

## 2022-10-24 ENCOUNTER — VIRTUAL VISIT (OUTPATIENT)
Dept: FAMILY MEDICINE CLINIC | Age: 82
End: 2022-10-24
Payer: MEDICARE

## 2022-10-24 DIAGNOSIS — R05.9 COUGH IN ADULT: Primary | ICD-10-CM

## 2022-10-24 PROCEDURE — 99442 PR PHYS/QHP TELEPHONE EVALUATION 11-20 MIN: CPT | Performed by: FAMILY MEDICINE

## 2022-10-24 RX ORDER — BENZONATATE 100 MG/1
100 CAPSULE ORAL
Qty: 21 CAPSULE | Refills: 1 | Status: SHIPPED | OUTPATIENT
Start: 2022-10-24 | End: 2022-10-31

## 2022-10-24 NOTE — PROGRESS NOTES
1. \"Have you been to the ER, urgent care clinic since your last visit? Hospitalized since your last visit? \" No    2. \"Have you seen or consulted any other health care providers outside of the [address] [place] [address] since your last visit? \" No     3. For patients aged 39-70: Has the patient had a colonoscopy / FIT/ Cologuard? NA - based on age      If the patient is female:    4. For patients aged 41-77: Has the patient had a mammogram within the past 2 years? NA - based on age or sex      11. For patients aged 21-65: Has the patient had a pap smear? NA - based on age or sex  Jose Espinoza is a [de-identified] y.o. male, evaluated via audio-only technology on 10/24/2022 for Nasal Congestion, Dizziness, and Cough (Dry /)  . Assessment & Plan:   Diagnoses and all orders for this visit:    1. Cough in adult  -     AMB POC COVID-19 COV; Future  -     benzonatate (TESSALON) 100 mg capsule; Take 1 Capsule by mouth three (3) times daily as needed for Cough for up to 7 days. COVID test invalid. Will return in am to repeat  12  Subjective:     Four days of ST, non productive cough. No fever or SOB. COVID boosters x 2. No sick contacts. Prior to Admission medications    Medication Sig Start Date End Date Taking? Authorizing Provider   benzonatate (TESSALON) 100 mg capsule Take 1 Capsule by mouth three (3) times daily as needed for Cough for up to 7 days. 10/24/22 10/31/22 Yes Michelle Leary MD   gabapentin (NEURONTIN) 300 mg capsule TAKE 1 CAPSULE BY MOUTH TWICE DAILY MAX  DAILY  AMOUNT  600MG 10/16/22  Yes Raeann Bucio MD   buPROPion XL (WELLBUTRIN XL) 150 mg tablet Take 1 Tablet by mouth daily.  In am  Indications: anxiousness associated with depression 9/26/22  Yes Kellee Bucio MD   sertraline (ZOLOFT) 100 mg tablet Take one at night  Indications: anxiousness associated with depression 9/26/22  Yes Raeann Bucio MD   hydroCHLOROthiazide (HYDRODIURIL) 12.5 mg tablet Take 1 Tablet by mouth daily. 8/23/22  Yes Luz Maria Bucio MD   metoprolol tartrate (LOPRESSOR) 25 mg tablet Take half once a day 8/23/22  Yes Raeann Bucio MD   metFORMIN (GLUCOPHAGE) 500 mg tablet Take half bid 8/23/22  Yes Raeann Bucio MD   lisinopriL (PRINIVIL, ZESTRIL) 10 mg tablet Take 1 Tablet by mouth daily. 8/23/22  Yes Raeann Bucio MD   polyethylene glycol (MIRALAX) 17 gram packet Take 1 Package by mouth as needed. Yes Provider, Historical   meloxicam (MOBIC) 15 mg tablet Take 15 mg by mouth daily. Yes Other, MD Chiara   lovastatin (MEVACOR) 40 mg tablet Take 1 Tab by mouth nightly. 11/7/13  Yes Nuha Hurtado PA   ASPIRIN PO Take 81 mg by mouth daily. Yes Provider, Historical   diazePAM (VALIUM) 5 mg tablet TAKE 1 TABLET BY MOUTH THE NIGHT BEFORE DENTAL VISIT AND THEN TAKE 1 TABLET 1 HOUR BEFORE DENTAL VISIT  Patient not taking: Reported on 10/24/2022 8/9/22   Provider, Historical     Patient Active Problem List   Diagnosis Code    Hypercholesterolemia E78.00    Insomnia G47.00    Obesity E66.9    Peripheral neuropathy G62.9    PVC's (premature ventricular contractions) I49.3    Anxiety and depression F41.9, F32. A    Type 2 diabetes mellitus treated without insulin ([facility]) E11.9     Current Outpatient Medications   Medication Sig Dispense Refill    benzonatate (TESSALON) 100 mg capsule Take 1 Capsule by mouth three (3) times daily as needed for Cough for up to 7 days. 21 Capsule 1    gabapentin (NEURONTIN) 300 mg capsule TAKE 1 CAPSULE BY MOUTH TWICE DAILY MAX  DAILY  AMOUNT  600MG 60 Capsule 0    buPROPion XL (WELLBUTRIN XL) 150 mg tablet Take 1 Tablet by mouth daily. In am  Indications: anxiousness associated with depression 90 Tablet 1    sertraline (ZOLOFT) 100 mg tablet Take one at night  Indications: anxiousness associated with depression 90 Tablet 1    hydroCHLOROthiazide (HYDRODIURIL) 12.5 mg tablet Take 1 Tablet by mouth daily.  90 Tablet 1    metoprolol tartrate (LOPRESSOR) 25 mg tablet Take half once a day 45 Tablet 1    metFORMIN (GLUCOPHAGE) 500 mg tablet Take half bid 90 Tablet 1    lisinopriL (PRINIVIL, ZESTRIL) 10 mg tablet Take 1 Tablet by mouth daily. 90 Tablet 1    polyethylene glycol (MIRALAX) 17 gram packet Take 1 Package by mouth as needed. meloxicam (MOBIC) 15 mg tablet Take 15 mg by mouth daily. lovastatin (MEVACOR) 40 mg tablet Take 1 Tab by mouth nightly. 90 Tab 0    ASPIRIN PO Take 81 mg by mouth daily. diazePAM (VALIUM) 5 mg tablet TAKE 1 TABLET BY MOUTH THE NIGHT BEFORE DENTAL VISIT AND THEN TAKE 1 TABLET 1 HOUR BEFORE DENTAL VISIT (Patient not taking: Reported on 10/24/2022)       No Known Allergies    ROS    No data recorded     Anastasia García was evaluated through a patient-initiated, synchronous (real-time) audio only encounter. He (or guardian if applicable) is aware that it is a billable service, which includes applicable co-pays, with coverage as determined by his insurance carrier. This visit was conducted with the patient's (and/or Darroll Face guardian's) verbal consent. He has not had a related appointment within my department in the past 7 days or scheduled within the next 24 hours. The patient was located in a state where the provider was licensed to provide care. The patient was located at: Home: [address] [place] [address] [phone]  The provider was located at:  Facility (Appt Department): [facility] [phone]    Total Time: minutes: 11-20 minutes    Latoya Perez MD

## 2022-10-25 ENCOUNTER — CLINICAL SUPPORT (OUTPATIENT)
Dept: FAMILY MEDICINE CLINIC | Age: 82
End: 2022-10-25
Payer: MEDICARE

## 2022-10-25 DIAGNOSIS — R05.9 COUGH IN ADULT: ICD-10-CM

## 2022-10-25 LAB
EXP DATE SOLUTION: NORMAL
EXP DATE SWAB: NORMAL
LOT NUMBER SOLUTION: NORMAL
LOT NUMBER SWAB: NORMAL
SARS-COV-2 RNA POC: NEGATIVE

## 2022-10-25 PROCEDURE — 87635 SARS-COV-2 COVID-19 AMP PRB: CPT | Performed by: FAMILY MEDICINE

## 2022-10-25 NOTE — PROGRESS NOTES
1. \"Have you been to the ER, urgent care clinic since your last visit? Hospitalized since your last visit? \" {Yes when where and reason for visit:20441}    2. \"Have you seen or consulted any other health care providers outside of the [address] [place] [address] since your last visit? \" {Yes when where and reason for visit:20441}     3. For patients aged 39-70: Has the patient had a colonoscopy / FIT/ Cologuard? {Colon Cancer Care Gap present?:64729}      If the patient is female:    4. For patients aged 41-77: Has the patient had a mammogram within the past 2 years? {Cancer Care Gap present?:72372}      5. For patients aged 21-65: Has the patient had a pap smear?  {Cancer Care Gap present?:44344}

## 2023-02-14 ENCOUNTER — OFFICE VISIT (OUTPATIENT)
Dept: FAMILY MEDICINE CLINIC | Age: 83
End: 2023-02-14
Payer: MEDICARE

## 2023-02-14 VITALS
BODY MASS INDEX: 34.3 KG/M2 | TEMPERATURE: 97.8 F | HEIGHT: 71 IN | WEIGHT: 245 LBS | DIASTOLIC BLOOD PRESSURE: 74 MMHG | RESPIRATION RATE: 12 BRPM | SYSTOLIC BLOOD PRESSURE: 134 MMHG | OXYGEN SATURATION: 98 % | HEART RATE: 84 BPM

## 2023-02-14 DIAGNOSIS — E78.5 HYPERLIPIDEMIA, UNSPECIFIED HYPERLIPIDEMIA TYPE: ICD-10-CM

## 2023-02-14 DIAGNOSIS — I10 PRIMARY HYPERTENSION: ICD-10-CM

## 2023-02-14 DIAGNOSIS — E11.42 DIABETIC PERIPHERAL NEUROPATHY ASSOCIATED WITH TYPE 2 DIABETES MELLITUS (HCC): ICD-10-CM

## 2023-02-14 DIAGNOSIS — E11.9 TYPE 2 DIABETES MELLITUS TREATED WITHOUT INSULIN (HCC): ICD-10-CM

## 2023-02-14 DIAGNOSIS — F41.9 ANXIETY AND DEPRESSION: Primary | ICD-10-CM

## 2023-02-14 DIAGNOSIS — F32.A ANXIETY AND DEPRESSION: Primary | ICD-10-CM

## 2023-02-14 PROCEDURE — G9717 DOC PT DX DEP/BP F/U NT REQ: HCPCS | Performed by: FAMILY MEDICINE

## 2023-02-14 PROCEDURE — 1101F PT FALLS ASSESS-DOCD LE1/YR: CPT | Performed by: FAMILY MEDICINE

## 2023-02-14 PROCEDURE — 99214 OFFICE O/P EST MOD 30 MIN: CPT | Performed by: FAMILY MEDICINE

## 2023-02-14 PROCEDURE — 3075F SYST BP GE 130 - 139MM HG: CPT | Performed by: FAMILY MEDICINE

## 2023-02-14 PROCEDURE — G8417 CALC BMI ABV UP PARAM F/U: HCPCS | Performed by: FAMILY MEDICINE

## 2023-02-14 PROCEDURE — 3078F DIAST BP <80 MM HG: CPT | Performed by: FAMILY MEDICINE

## 2023-02-14 PROCEDURE — 1123F ACP DISCUSS/DSCN MKR DOCD: CPT | Performed by: FAMILY MEDICINE

## 2023-02-14 PROCEDURE — G8427 DOCREV CUR MEDS BY ELIG CLIN: HCPCS | Performed by: FAMILY MEDICINE

## 2023-02-14 PROCEDURE — G8536 NO DOC ELDER MAL SCRN: HCPCS | Performed by: FAMILY MEDICINE

## 2023-02-14 RX ORDER — GABAPENTIN 300 MG/1
CAPSULE ORAL
Qty: 60 CAPSULE | Refills: 0 | Status: SHIPPED | OUTPATIENT
Start: 2023-02-14

## 2023-02-14 RX ORDER — BUPROPION HYDROCHLORIDE 300 MG/1
300 TABLET ORAL
Qty: 30 TABLET | Refills: 0 | Status: SHIPPED | OUTPATIENT
Start: 2023-02-14

## 2023-02-14 NOTE — PROGRESS NOTES
1. \"Have you been to the ER, urgent care clinic since your last visit? Hospitalized since your last visit? \" No    2. \"Have you seen or consulted any other health care providers outside of the [address] [place] [address] since your last visit? \" No     3. For patients aged 39-70: Has the patient had a colonoscopy / FIT/ Cologuard? NA - based on age      If the patient is female:    4. For patients aged 41-77: Has the patient had a mammogram within the past 2 years? NA - based on age or sex      11. For patients aged 21-65: Has the patient had a pap smear?  NA - based on age or sex

## 2023-02-14 NOTE — PROGRESS NOTES
Roque Mera is a 82 y.o. male who presents to the office today with the following:  Chief Complaint   Patient presents with    Depression     More anxiety, nervous, increasing lately, worried about cognitive       HPI  Concerned re mental state  Is Worried and has fear  Feeling uneasy, has decreased concentration  Feeling Hopelessness, unworthy  Not suicidal  Not seeing counselor  Would prefer group    Ran out of Gabapentin  taking it bid in the past  Stopped it for a while  Not feeling much in legs  And has fallen 8x in last year, 2 d ago last time  No injury  Loosing balance  Was doing exercises in [place]  But not at home  Taking a Vit B complex    Gabapentin also helped with sleep  Did not make him drowsy during the day    Still on Sertraline 100 mg  And Wellbutrin 150 mg  Would like to try increasing dose    Concerned re memory    Hx of Prediabetes  Hyperlipidemia  On Lovastatin  Due for BW recheck    Review of Systems   HENT:  Negative for congestion. Respiratory:  Negative for cough. Cardiovascular:  Negative for chest pain. Musculoskeletal:  Positive for falls. Neurological:  Negative for seizures. Psychiatric/Behavioral:  Positive for depression. Negative for hallucinations, substance abuse and suicidal ideas. The patient is nervous/anxious. The patient does not have insomnia. See HPI.     Past Medical History:   Diagnosis Date    Depression     Hypercholesterolemia     Insomnia     Obesity     Peripheral neuropathy     PVC's (premature ventricular contractions)     Type 2 diabetes mellitus treated without insulin ([facility] [address]        Past Surgical History:   Procedure Laterality Date    ENDOSCOPY, COLON, DIAGNOSTIC  2003, 2010    [place] CARDIAC SURG PROCEDURE UNLIST  9/2010    catheterization       No Known Allergies    Current Outpatient Medications   Medication Sig    buPROPion XL (WELLBUTRIN XL) 300 mg XL tablet Take 1 Tablet by mouth every morning.    gabapentin (NEURONTIN) 300 mg capsule TAKE 1 CAPSULE BY MOUTH TWICE DAILY MAX  DAILY  AMOUNT  600MG    hydroCHLOROthiazide (HYDRODIURIL) 12.5 mg tablet Take 1 Tablet by mouth daily. metoprolol tartrate (LOPRESSOR) 25 mg tablet Take half once a day    metFORMIN (GLUCOPHAGE) 500 mg tablet Take half bid    lisinopriL (PRINIVIL, ZESTRIL) 10 mg tablet Take 1 Tablet by mouth daily. sertraline (ZOLOFT) 100 mg tablet Take one at night  Indications: anxiousness associated with depression    polyethylene glycol (MIRALAX) 17 gram packet Take 1 Package by mouth as needed. meloxicam (MOBIC) 15 mg tablet Take 15 mg by mouth daily. lovastatin (MEVACOR) 40 mg tablet Take 1 Tab by mouth nightly. ASPIRIN PO Take 81 mg by mouth daily. No current facility-administered medications for this visit. Social History     Socioeconomic History    Marital status:     Number of children: 4    Highest education level: Bachelor's degree (e.g., BA, AB, BS)   Tobacco Use    Smoking status: Former     Packs/day: 1.00     Types: Cigarettes     Start date: 1962     Quit date: 2011     Years since quittin.9    Smokeless tobacco: Never   Vaping Use    Vaping Use: Never used   Substance and Sexual Activity    Alcohol use: No    Drug use: Never       Family History   Problem Relation Age of Onset    Heart Disease Mother     Heart Disease Father          Physical Exam:  Visit Vitals  /74 (BP 1 Location: Right upper arm, BP Patient Position: Sitting, BP Cuff Size: Adult long)   Pulse 84   Temp 97.8 °F (36.6 °C)   Resp 12   Ht 5' 11\" (1.803 m)   Wt 245 lb (111.1 kg)   SpO2 98%   BMI 34.17 kg/m²     Physical Exam  Vitals and nursing note reviewed. Constitutional:       Appearance: He is obese. HENT:      Head: Normocephalic and atraumatic.       Right Ear: Tympanic membrane, ear canal and external ear normal.      Left Ear: Tympanic membrane, ear canal and external ear normal.      Mouth/Throat:      Mouth: Mucous membranes are moist. Pharynx: Oropharynx is clear. Eyes:      Extraocular Movements: Extraocular movements intact. Conjunctiva/sclera: Conjunctivae normal.   Cardiovascular:      Rate and Rhythm: Normal rate and regular rhythm. Heart sounds: Normal heart sounds. Pulmonary:      Effort: Pulmonary effort is normal.      Breath sounds: Normal breath sounds. Musculoskeletal:      Right lower leg: No edema. Left lower leg: No edema. Lymphadenopathy:      Cervical: No cervical adenopathy. Skin:     General: Skin is warm and dry. Neurological:      Mental Status: He is alert and oriented to person, place, and time. Psychiatric:         Mood and Affect: Mood normal.         Behavior: Behavior normal.     [facility] 24/30    Assessment/Plan:    ICD-10-CM ICD-9-CM    1. Anxiety and depression  F41.9 300.00 buPROPion XL (WELLBUTRIN XL) 300 mg XL tablet    F32. A 311       2. Diabetic peripheral neuropathy associated with type 2 diabetes mellitus (HCC)  E11.42 250.60 gabapentin (NEURONTIN) 300 mg capsule     357.2 VITAMIN B12      3. Type 2 diabetes mellitus treated without insulin ([facility])  E11.9 250.00 HEMOGLOBIN A1C WITH EAG      HEMOGLOBIN A1C WITH EAG      4. Primary hypertension  A81 748.2 METABOLIC PANEL, BASIC      METABOLIC PANEL, BASIC      5.  Hyperlipidemia, unspecified hyperlipidemia type  E78.5 272.4 LIPID PANEL      LIPID PANEL        Encouraged to cont balance exercises at home  And memory exercises as well  Wellbutrin increased to 300 mg  Gabapentin restarted  Recheck in 1 mo      Jasmine Daugherty MD

## 2023-02-23 DIAGNOSIS — E11.9 TYPE 2 DIABETES MELLITUS TREATED WITHOUT INSULIN (HCC): ICD-10-CM

## 2023-02-23 RX ORDER — METFORMIN HYDROCHLORIDE 500 MG/1
TABLET ORAL
Qty: 90 TABLET | Refills: 0 | Status: SHIPPED | OUTPATIENT
Start: 2023-02-23

## 2023-03-14 ENCOUNTER — OFFICE VISIT (OUTPATIENT)
Dept: FAMILY MEDICINE CLINIC | Age: 83
End: 2023-03-14
Payer: MEDICARE

## 2023-03-14 VITALS
OXYGEN SATURATION: 95 % | HEART RATE: 69 BPM | DIASTOLIC BLOOD PRESSURE: 64 MMHG | HEIGHT: 70 IN | WEIGHT: 252 LBS | SYSTOLIC BLOOD PRESSURE: 124 MMHG | RESPIRATION RATE: 12 BRPM | BODY MASS INDEX: 36.08 KG/M2 | TEMPERATURE: 98 F

## 2023-03-14 DIAGNOSIS — F41.9 ANXIETY AND DEPRESSION: ICD-10-CM

## 2023-03-14 DIAGNOSIS — E11.9 TYPE 2 DIABETES MELLITUS TREATED WITHOUT INSULIN (HCC): Primary | ICD-10-CM

## 2023-03-14 DIAGNOSIS — F32.A ANXIETY AND DEPRESSION: ICD-10-CM

## 2023-03-14 DIAGNOSIS — I10 PRIMARY HYPERTENSION: ICD-10-CM

## 2023-03-14 DIAGNOSIS — E78.5 HYPERLIPIDEMIA, UNSPECIFIED HYPERLIPIDEMIA TYPE: ICD-10-CM

## 2023-03-14 DIAGNOSIS — E11.42 DIABETIC PERIPHERAL NEUROPATHY ASSOCIATED WITH TYPE 2 DIABETES MELLITUS (HCC): ICD-10-CM

## 2023-03-14 DIAGNOSIS — E66.01 SEVERE OBESITY (BMI 35.0-39.9) WITH COMORBIDITY (HCC): ICD-10-CM

## 2023-03-14 PROCEDURE — 3078F DIAST BP <80 MM HG: CPT | Performed by: FAMILY MEDICINE

## 2023-03-14 PROCEDURE — 3074F SYST BP LT 130 MM HG: CPT | Performed by: FAMILY MEDICINE

## 2023-03-14 PROCEDURE — G8427 DOCREV CUR MEDS BY ELIG CLIN: HCPCS | Performed by: FAMILY MEDICINE

## 2023-03-14 PROCEDURE — G8536 NO DOC ELDER MAL SCRN: HCPCS | Performed by: FAMILY MEDICINE

## 2023-03-14 PROCEDURE — 99214 OFFICE O/P EST MOD 30 MIN: CPT | Performed by: FAMILY MEDICINE

## 2023-03-14 PROCEDURE — G9717 DOC PT DX DEP/BP F/U NT REQ: HCPCS | Performed by: FAMILY MEDICINE

## 2023-03-14 PROCEDURE — 1123F ACP DISCUSS/DSCN MKR DOCD: CPT | Performed by: FAMILY MEDICINE

## 2023-03-14 PROCEDURE — G8417 CALC BMI ABV UP PARAM F/U: HCPCS | Performed by: FAMILY MEDICINE

## 2023-03-14 PROCEDURE — 1101F PT FALLS ASSESS-DOCD LE1/YR: CPT | Performed by: FAMILY MEDICINE

## 2023-03-14 RX ORDER — GABAPENTIN 300 MG/1
CAPSULE ORAL
Qty: 180 CAPSULE | Refills: 0 | Status: SHIPPED | OUTPATIENT
Start: 2023-03-14

## 2023-03-14 RX ORDER — BUPROPION HYDROCHLORIDE 300 MG/1
300 TABLET ORAL
Qty: 90 TABLET | Refills: 1 | Status: SHIPPED | OUTPATIENT
Start: 2023-03-14

## 2023-03-14 NOTE — PROGRESS NOTES
YES Answers must have Comments  1. \"Have you been to the ER, urgent care clinic since your last visit? Hospitalized since your last visit? \"    [] YES   [x] NO       2. Have you seen or consulted any other health care providers outside of [address] [place] [address] since your last visit?     [] YES   [x] NO       3. For patients aged 39-70: Have you had a colorectal cancer screening such as a colonoscopy/FIT/Cologuard? Nurse/CMA to request records if not in chart   [] YES   [] NO   [x] NA, based on age    If the patient is female:      4. For female patients aged 41-77: Johnnie Landaverde you had a mammogram in the last two years?  Nurse/CMA to request records if not in chart   [] YES   [] NO   [] NA, based on age    11. For female patients aged 21-65: Johnnie Landaverde you had a pap smear?   Nurse/CMA to request records if not in chart   [] YES   [] NO  [] NA, based on age

## 2023-03-14 NOTE — PROGRESS NOTES
Tj Hernandez is a 82 y.o. male who presents to the office today with the following:  Chief Complaint   Patient presents with    Anxiety     Follow up         HPI    Depression and Anxiety  Last visit Wellbutrin increased to 300 mg  Feeling better now  No SE  Not down too much anymore  And if down not too long  More active inside and doing things    And Gabapentin restarted for Diabetic Neuropathy  Taking it bid  Helping and not SE  Help with sleep    Due for BW  Last visit was dehydrated    Memory loss  Pt does not care now  Last [place] 19/27    Was still taking Mobic for pain  But last Creatinine 1.35    DM  Due for BW    HTN  BP is good    Hyperlipidemia      ROS  See HPI. Past Medical History:   Diagnosis Date    Depression     Hypercholesterolemia     Insomnia     Obesity     Peripheral neuropathy     PVC's (premature ventricular contractions)     Type 2 diabetes mellitus treated without insulin ([facility] [address]        Past Surgical History:   Procedure Laterality Date    ENDOSCOPY, COLON, DIAGNOSTIC  2003, 2010    [place] CARDIAC SURG PROCEDURE UNLIST  9/2010    catheterization       No Known Allergies    Current Outpatient Medications   Medication Sig    buPROPion XL (WELLBUTRIN XL) 300 mg XL tablet Take 1 Tablet by mouth every morning.    gabapentin (NEURONTIN) 300 mg capsule TAKE 1 CAPSULE BY MOUTH TWICE DAILY MAX  DAILY  AMOUNT  600MG  Indications: neuropathic pain    metFORMIN (GLUCOPHAGE) 500 mg tablet Take 1/2 (one-half) tablet by mouth twice daily    ferrous fumarate/vit Bcomp,C (SUPER B COMPLEX PO) Take  by mouth. hydroCHLOROthiazide (HYDRODIURIL) 12.5 mg tablet Take 1 Tablet by mouth daily. metoprolol tartrate (LOPRESSOR) 25 mg tablet Take half once a day    lisinopriL (PRINIVIL, ZESTRIL) 10 mg tablet Take 1 Tablet by mouth daily.     sertraline (ZOLOFT) 100 mg tablet Take one at night  Indications: anxiousness associated with depression    polyethylene glycol (MIRALAX) 17 gram packet Take 1 Package by mouth as needed. lovastatin (MEVACOR) 40 mg tablet Take 1 Tab by mouth nightly. ASPIRIN PO Take 81 mg by mouth daily. No current facility-administered medications for this visit. Social History     Socioeconomic History    Marital status:     Number of children: 4    Highest education level: Bachelor's degree (e.g., BA, AB, BS)   Tobacco Use    Smoking status: Former     Packs/day: 1.00     Types: Cigarettes     Start date: 1962     Quit date: 2011     Years since quittin.0    Smokeless tobacco: Never   Vaping Use    Vaping Use: Never used   Substance and Sexual Activity    Alcohol use: No    Drug use: Never     Social Determinants of Health     Financial Resource Strain: Low Risk     Difficulty of Paying Living Expenses: Not very hard   Food Insecurity: No Food Insecurity    Worried About Running Out of Food in the Last Year: Never true    Ran Out of Food in the Last Year: Never true   Transportation Needs: No Transportation Needs    Lack of Transportation (Medical): No    Lack of Transportation (Non-Medical): No   Housing Stability: Unknown    Unable to Pay for Housing in the Last Year: No    Unstable Housing in the Last Year: No       Family History   Problem Relation Age of Onset    Heart Disease Mother     Heart Disease Father          Physical Exam:  Visit Vitals  /64 (BP 1 Location: Right upper arm, BP Patient Position: Sitting, BP Cuff Size: Adult long)   Pulse 69   Temp 98 °F (36.7 °C)   Resp 12   Ht 5' 10\" (1.778 m)   Wt 252 lb (114.3 kg)   SpO2 95%   BMI 36.16 kg/m²     Physical Exam  Vitals and nursing note reviewed. Constitutional:       Appearance: He is obese. HENT:      Head: Normocephalic and atraumatic. Eyes:      Extraocular Movements: Extraocular movements intact. Conjunctiva/sclera: Conjunctivae normal.   Cardiovascular:      Rate and Rhythm: Normal rate and regular rhythm. Heart sounds: Normal heart sounds.    Pulmonary:      Effort: Pulmonary effort is normal.      Breath sounds: Normal breath sounds. Musculoskeletal:      Right lower leg: No edema. Left lower leg: No edema. Skin:     General: Skin is warm and dry. Neurological:      Mental Status: He is alert and oriented to person, place, and time. Psychiatric:         Mood and Affect: Mood normal.         Behavior: Behavior normal.       Assessment/Plan:    ICD-10-CM ICD-9-CM    1. Type 2 diabetes mellitus treated without insulin (HCC)  E11.9 250.00 HEMOGLOBIN A1C WITH EAG      HEMOGLOBIN A1C WITH EAG      2. Anxiety and depression  F41.9 300.00 buPROPion XL (WELLBUTRIN XL) 300 mg XL tablet    F32. A 311       3. Diabetic peripheral neuropathy associated with type 2 diabetes mellitus (HCC)  E11.42 250.60 gabapentin (NEURONTIN) 300 mg capsule     357.2 VITAMIN B12      4. Severe obesity (BMI 35.0-39. 9) with comorbidity ([facility] [address]  E66.01 278.01       5. Primary hypertension  B63 736.1 METABOLIC PANEL, BASIC      METABOLIC PANEL, BASIC      6. Hyperlipidemia, unspecified hyperlipidemia type  E78.5 272.4 LIPID PANEL      LIPID PANEL        Doing well with depression and anxiety meds and Gabapentin  Cont current doses  Recheck in 3 mo  try Tylenol arthritis or ES Tylenol for pain bid and stop Mobic, Advil and or Aleve  Cont ASA 81 mg    Follow-up and Dispositions    Return in about 3 months (around 6/14/2023).              Floyd Cardenas MD

## 2023-03-15 LAB
ANION GAP SERPL CALC-SCNC: 3 MMOL/L (ref 5–15)
BUN SERPL-MCNC: 28 MG/DL (ref 6–20)
BUN/CREAT SERPL: 21 (ref 12–20)
CALCIUM SERPL-MCNC: 9.2 MG/DL (ref 8.5–10.1)
CHLORIDE SERPL-SCNC: 108 MMOL/L (ref 97–108)
CHOLEST SERPL-MCNC: 116 MG/DL
CO2 SERPL-SCNC: 30 MMOL/L (ref 21–32)
CREAT SERPL-MCNC: 1.33 MG/DL (ref 0.7–1.3)
EST. AVERAGE GLUCOSE BLD GHB EST-MCNC: 123 MG/DL
GLUCOSE SERPL-MCNC: 79 MG/DL (ref 65–100)
HBA1C MFR BLD: 5.9 % (ref 4–5.6)
HDLC SERPL-MCNC: 49 MG/DL
HDLC SERPL: 2.4 (ref 0–5)
LDLC SERPL CALC-MCNC: 44 MG/DL (ref 0–100)
POTASSIUM SERPL-SCNC: 5.1 MMOL/L (ref 3.5–5.1)
SODIUM SERPL-SCNC: 141 MMOL/L (ref 136–145)
TRIGL SERPL-MCNC: 115 MG/DL (ref ?–150)
VIT B12 SERPL-MCNC: 528 PG/ML (ref 193–986)
VLDLC SERPL CALC-MCNC: 23 MG/DL

## 2023-03-16 DIAGNOSIS — E11.9 TYPE 2 DIABETES MELLITUS TREATED WITHOUT INSULIN (HCC): ICD-10-CM

## 2023-03-16 RX ORDER — METFORMIN HYDROCHLORIDE 500 MG/1
TABLET ORAL
Qty: 90 TABLET | Refills: 0
Start: 2023-03-16

## 2023-03-16 RX ORDER — LOVASTATIN 40 MG/1
TABLET ORAL
Qty: 90 TABLET | Refills: 0
Start: 2023-03-16

## 2023-03-16 NOTE — PROGRESS NOTES
Notify pt and daughter  The HbA1C is 5.9, in the prediabetes range and a little better than 6 mo ago, he may back off the Metformin and take only half a tablet at dinner time  The electrolytes are good  The kidney tests are still elevated but stable, but avoid NSAIDS  The Vit B12 is normal  The Chol is great, he may take half a tablet of the Lovastatin  Recheck in 6 mo

## 2023-04-11 ENCOUNTER — APPOINTMENT (OUTPATIENT)
Dept: MRI IMAGING | Age: 83
DRG: 948 | End: 2023-04-11
Attending: INTERNAL MEDICINE
Payer: MEDICARE

## 2023-04-11 ENCOUNTER — APPOINTMENT (OUTPATIENT)
Dept: CT IMAGING | Age: 83
DRG: 948 | End: 2023-04-11
Attending: EMERGENCY MEDICINE
Payer: MEDICARE

## 2023-04-11 ENCOUNTER — HOSPITAL ENCOUNTER (INPATIENT)
Age: 83
LOS: 2 days | Discharge: NURSING, REHAB OR LONG-TERM CARE | DRG: 948 | End: 2023-04-18
Attending: EMERGENCY MEDICINE | Admitting: INTERNAL MEDICINE
Payer: MEDICARE

## 2023-04-11 ENCOUNTER — APPOINTMENT (OUTPATIENT)
Dept: ULTRASOUND IMAGING | Age: 83
DRG: 948 | End: 2023-04-11
Attending: INTERNAL MEDICINE
Payer: MEDICARE

## 2023-04-11 DIAGNOSIS — I63.9 CEREBROVASCULAR ACCIDENT (CVA), UNSPECIFIED MECHANISM (HCC): Primary | ICD-10-CM

## 2023-04-11 PROBLEM — R53.1 LEFT-SIDED WEAKNESS: Status: ACTIVE | Noted: 2023-04-11
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
## 2023-04-11 PROBLEM — R41.82 ALTERED MENTAL STATE: Status: ACTIVE | Noted: 2023-04-11

## 2023-04-11 PROBLEM — R42 DYSEQUILIBRIUM: Status: ACTIVE | Noted: 2023-04-11

## 2023-04-11 LAB
ALBUMIN SERPL-MCNC: 4 G/DL (ref 3.5–5)
ALBUMIN/GLOB SERPL: 1 (ref 1.1–2.2)
ALP SERPL-CCNC: 61 U/L (ref 45–117)
ALT SERPL-CCNC: 24 U/L (ref 12–78)
ANION GAP SERPL CALC-SCNC: 11 MMOL/L (ref 5–15)
APPEARANCE UR: CLEAR
AST SERPL-CCNC: 25 U/L (ref 15–37)
ATRIAL RATE: 86 BPM
BASOPHILS # BLD: 0.1 K/UL (ref 0–0.1)
BASOPHILS NFR BLD: 1 % (ref 0–1)
BILIRUB SERPL-MCNC: 0.9 MG/DL (ref 0.2–1)
BILIRUB UR QL: NEGATIVE
BUN SERPL-MCNC: 23 MG/DL (ref 6–20)
BUN/CREAT SERPL: 17 (ref 12–20)
CALCIUM SERPL-MCNC: 9.4 MG/DL (ref 8.5–10.1)
CALCULATED P AXIS, ECG09: 48 DEGREES
CALCULATED R AXIS, ECG10: -30 DEGREES
CALCULATED T AXIS, ECG11: 60 DEGREES
CHLORIDE SERPL-SCNC: 100 MMOL/L (ref 97–108)
CO2 SERPL-SCNC: 30 MMOL/L (ref 21–32)
COLOR UR: ABNORMAL
CREAT SERPL-MCNC: 1.38 MG/DL (ref 0.7–1.3)
DIAGNOSIS, 93000: NORMAL
DIFFERENTIAL METHOD BLD: ABNORMAL
EOSINOPHIL # BLD: 0.2 K/UL (ref 0–0.4)
EOSINOPHIL NFR BLD: 2 % (ref 0–7)
ERYTHROCYTE [DISTWIDTH] IN BLOOD BY AUTOMATED COUNT: 13.7 % (ref 11.5–14.5)
GLOBULIN SER CALC-MCNC: 4.1 G/DL (ref 2–4)
GLUCOSE BLD STRIP.AUTO-MCNC: 72 MG/DL (ref 65–117)
GLUCOSE BLD STRIP.AUTO-MCNC: 82 MG/DL (ref 65–117)
GLUCOSE SERPL-MCNC: 84 MG/DL (ref 65–100)
GLUCOSE UR STRIP.AUTO-MCNC: NEGATIVE MG/DL
HCT VFR BLD AUTO: 42.3 % (ref 36.6–50.3)
HGB BLD-MCNC: 13.9 G/DL (ref 12.1–17)
HGB UR QL STRIP: NEGATIVE
IMM GRANULOCYTES # BLD AUTO: 0.1 K/UL (ref 0–0.04)
IMM GRANULOCYTES NFR BLD AUTO: 1 % (ref 0–0.5)
INR PPP: 1 (ref 0.9–1.1)
KETONES UR QL STRIP.AUTO: 15 MG/DL
LEFT CCA DIST DIAS: 17.4 CM/S
LEFT CCA DIST SYS: 60.5 CM/S
LEFT CCA PROX DIAS: 16.1 CM/S
LEFT CCA PROX SYS: 76 CM/S
LEFT ECA DIAS: 18.64 CM/S
LEFT ECA SYS: 108.1 CM/S
LEFT ICA DIST DIAS: 23.4 CM/S
LEFT ICA DIST SYS: 73.8 CM/S
LEFT ICA MID DIAS: 19.3 CM/S
LEFT ICA MID SYS: 75.1 CM/S
LEFT ICA PROX DIAS: 15 CM/S
LEFT ICA PROX SYS: 63 CM/S
LEFT ICA/CCA SYS: 0.99 NO UNITS
LEFT VERTEBRAL DIAS: 15.41 CM/S
LEFT VERTEBRAL SYS: 49.1 CM/S
LEUKOCYTE ESTERASE UR QL STRIP.AUTO: NEGATIVE
LYMPHOCYTES # BLD: 1.6 K/UL (ref 0.8–3.5)
LYMPHOCYTES NFR BLD: 17 % (ref 12–49)
MCH RBC QN AUTO: 32 PG (ref 26–34)
MCHC RBC AUTO-ENTMCNC: 32.9 G/DL (ref 30–36.5)
MCV RBC AUTO: 97.2 FL (ref 80–99)
MONOCYTES # BLD: 0.5 K/UL (ref 0–1)
MONOCYTES NFR BLD: 5 % (ref 5–13)
NEUTS SEG # BLD: 7.1 K/UL (ref 1.8–8)
NEUTS SEG NFR BLD: 74 % (ref 32–75)
NITRITE UR QL STRIP.AUTO: NEGATIVE
NRBC # BLD: 0 K/UL (ref 0–0.01)
NRBC BLD-RTO: 0 PER 100 WBC
P-R INTERVAL, ECG05: 160 MS
PH UR STRIP: 6 (ref 5–8)
PLATELET # BLD AUTO: 242 K/UL (ref 150–400)
PMV BLD AUTO: 9.6 FL (ref 8.9–12.9)
POTASSIUM SERPL-SCNC: 4.1 MMOL/L (ref 3.5–5.1)
PROT SERPL-MCNC: 8.1 G/DL (ref 6.4–8.2)
PROT UR STRIP-MCNC: NEGATIVE MG/DL
PROTHROMBIN TIME: 10.1 SEC (ref 9–11.1)
Q-T INTERVAL, ECG07: 370 MS
QRS DURATION, ECG06: 106 MS
QTC CALCULATION (BEZET), ECG08: 442 MS
RBC # BLD AUTO: 4.35 M/UL (ref 4.1–5.7)
RIGHT CCA DIST DIAS: 11.7 CM/S
RIGHT CCA DIST SYS: 59.5 CM/S
RIGHT CCA PROX DIAS: 13 CM/S
RIGHT CCA PROX SYS: 59 CM/S
RIGHT ECA DIAS: 17.99 CM/S
RIGHT ECA SYS: 148 CM/S
RIGHT ICA DIST DIAS: 18.2 CM/S
RIGHT ICA DIST SYS: 67.5 CM/S
RIGHT ICA MID DIAS: 23.9 CM/S
RIGHT ICA MID SYS: 77.5 CM/S
RIGHT ICA PROX DIAS: 13 CM/S
RIGHT ICA PROX SYS: 75 CM/S
RIGHT ICA/CCA SYS: 1.3 NO UNITS
RIGHT VERTEBRAL DIAS: 11.38 CM/S
RIGHT VERTEBRAL SYS: 35.1 CM/S
SERVICE CMNT-IMP: NORMAL
SERVICE CMNT-IMP: NORMAL
SODIUM SERPL-SCNC: 141 MMOL/L (ref 136–145)
SP GR UR REFRACTOMETRY: 1.02 (ref 1–1.03)
UROBILINOGEN UR QL STRIP.AUTO: 0.2 EU/DL (ref 0.2–1)
VENTRICULAR RATE, ECG03: 86 BPM
WBC # BLD AUTO: 9.4 K/UL (ref 4.1–11.1)

## 2023-04-11 PROCEDURE — 94762 N-INVAS EAR/PLS OXIMTRY CONT: CPT

## 2023-04-11 PROCEDURE — 70450 CT HEAD/BRAIN W/O DYE: CPT

## 2023-04-11 PROCEDURE — 81003 URINALYSIS AUTO W/O SCOPE: CPT

## 2023-04-11 PROCEDURE — 74011000250 HC RX REV CODE- 250: Performed by: INTERNAL MEDICINE

## 2023-04-11 PROCEDURE — 82962 GLUCOSE BLOOD TEST: CPT

## 2023-04-11 PROCEDURE — 70551 MRI BRAIN STEM W/O DYE: CPT

## 2023-04-11 PROCEDURE — 93880 EXTRACRANIAL BILAT STUDY: CPT

## 2023-04-11 PROCEDURE — G0378 HOSPITAL OBSERVATION PER HR: HCPCS

## 2023-04-11 PROCEDURE — 36415 COLL VENOUS BLD VENIPUNCTURE: CPT

## 2023-04-11 PROCEDURE — 85610 PROTHROMBIN TIME: CPT

## 2023-04-11 PROCEDURE — 74011250637 HC RX REV CODE- 250/637: Performed by: INTERNAL MEDICINE

## 2023-04-11 PROCEDURE — 99285 EMERGENCY DEPT VISIT HI MDM: CPT

## 2023-04-11 PROCEDURE — 93005 ELECTROCARDIOGRAM TRACING: CPT

## 2023-04-11 PROCEDURE — 80053 COMPREHEN METABOLIC PANEL: CPT

## 2023-04-11 PROCEDURE — 85025 COMPLETE CBC W/AUTO DIFF WBC: CPT

## 2023-04-11 RX ORDER — METFORMIN HYDROCHLORIDE 500 MG/1
250 TABLET ORAL
Status: DISCONTINUED | OUTPATIENT
Start: 2023-04-11 | End: 2023-04-13

## 2023-04-11 RX ORDER — SODIUM CHLORIDE 0.9 % (FLUSH) 0.9 %
5-40 SYRINGE (ML) INJECTION EVERY 8 HOURS
Status: DISCONTINUED | OUTPATIENT
Start: 2023-04-11 | End: 2023-04-15

## 2023-04-11 RX ORDER — ATORVASTATIN CALCIUM 20 MG/1
20 TABLET, FILM COATED ORAL
Status: DISCONTINUED | OUTPATIENT
Start: 2023-04-11 | End: 2023-04-18 | Stop reason: HOSPADM

## 2023-04-11 RX ORDER — ACETAMINOPHEN 650 MG/1
650 SUPPOSITORY RECTAL
Status: DISCONTINUED | OUTPATIENT
Start: 2023-04-11 | End: 2023-04-18 | Stop reason: HOSPADM

## 2023-04-11 RX ORDER — SODIUM CHLORIDE 0.9 % (FLUSH) 0.9 %
5-40 SYRINGE (ML) INJECTION AS NEEDED
Status: DISCONTINUED | OUTPATIENT
Start: 2023-04-11 | End: 2023-04-18 | Stop reason: HOSPADM

## 2023-04-11 RX ORDER — ENOXAPARIN SODIUM 100 MG/ML
30 INJECTION SUBCUTANEOUS EVERY 12 HOURS
Status: DISCONTINUED | OUTPATIENT
Start: 2023-04-12 | End: 2023-04-18 | Stop reason: HOSPADM

## 2023-04-11 RX ORDER — FAMOTIDINE 20 MG/1
20 TABLET, FILM COATED ORAL EVERY EVENING
Status: DISCONTINUED | OUTPATIENT
Start: 2023-04-11 | End: 2023-04-18

## 2023-04-11 RX ORDER — ENOXAPARIN SODIUM 100 MG/ML
40 INJECTION SUBCUTANEOUS DAILY
Status: DISCONTINUED | OUTPATIENT
Start: 2023-04-12 | End: 2023-04-11

## 2023-04-11 RX ORDER — ONDANSETRON 4 MG/1
4 TABLET, ORALLY DISINTEGRATING ORAL
Status: DISCONTINUED | OUTPATIENT
Start: 2023-04-11 | End: 2023-04-18 | Stop reason: HOSPADM

## 2023-04-11 RX ORDER — ONDANSETRON 2 MG/ML
4 INJECTION INTRAMUSCULAR; INTRAVENOUS
Status: DISCONTINUED | OUTPATIENT
Start: 2023-04-11 | End: 2023-04-18 | Stop reason: HOSPADM

## 2023-04-11 RX ORDER — LISINOPRIL 10 MG/1
10 TABLET ORAL DAILY
Status: DISCONTINUED | OUTPATIENT
Start: 2023-04-12 | End: 2023-04-15

## 2023-04-11 RX ORDER — METOPROLOL SUCCINATE 25 MG/1
25 TABLET, EXTENDED RELEASE ORAL DAILY
Status: DISCONTINUED | OUTPATIENT
Start: 2023-04-12 | End: 2023-04-11 | Stop reason: SDUPTHER

## 2023-04-11 RX ORDER — SERTRALINE HYDROCHLORIDE 100 MG/1
100 TABLET, FILM COATED ORAL
Status: DISCONTINUED | OUTPATIENT
Start: 2023-04-11 | End: 2023-04-17

## 2023-04-11 RX ORDER — METOPROLOL SUCCINATE 25 MG/1
25 TABLET, EXTENDED RELEASE ORAL DAILY
Status: DISCONTINUED | OUTPATIENT
Start: 2023-04-12 | End: 2023-04-18 | Stop reason: HOSPADM

## 2023-04-11 RX ORDER — BUPROPION HYDROCHLORIDE 150 MG/1
150 TABLET ORAL
Status: DISCONTINUED | OUTPATIENT
Start: 2023-04-12 | End: 2023-04-18

## 2023-04-11 RX ORDER — ACETAMINOPHEN 325 MG/1
650 TABLET ORAL
Status: DISCONTINUED | OUTPATIENT
Start: 2023-04-11 | End: 2023-04-18 | Stop reason: HOSPADM

## 2023-04-11 RX ORDER — POLYETHYLENE GLYCOL 3350 17 G/17G
17 POWDER, FOR SOLUTION ORAL DAILY PRN
Status: DISCONTINUED | OUTPATIENT
Start: 2023-04-11 | End: 2023-04-18 | Stop reason: HOSPADM

## 2023-04-11 RX ORDER — GABAPENTIN 300 MG/1
300 CAPSULE ORAL 2 TIMES DAILY
Status: DISCONTINUED | OUTPATIENT
Start: 2023-04-11 | End: 2023-04-12

## 2023-04-11 RX ORDER — GUAIFENESIN 100 MG/5ML
81 LIQUID (ML) ORAL DAILY
Status: DISCONTINUED | OUTPATIENT
Start: 2023-04-12 | End: 2023-04-18 | Stop reason: HOSPADM

## 2023-04-11 RX ADMIN — FAMOTIDINE 20 MG: 20 TABLET, FILM COATED ORAL at 17:28

## 2023-04-11 RX ADMIN — GABAPENTIN 300 MG: 300 CAPSULE ORAL at 17:28

## 2023-04-11 RX ADMIN — SODIUM CHLORIDE, PRESERVATIVE FREE 10 ML: 5 INJECTION INTRAVENOUS at 17:29

## 2023-04-11 RX ADMIN — SODIUM CHLORIDE, PRESERVATIVE FREE 10 ML: 5 INJECTION INTRAVENOUS at 22:49

## 2023-04-11 RX ADMIN — METFORMIN HYDROCHLORIDE 250 MG: 500 TABLET ORAL at 17:28

## 2023-04-11 RX ADMIN — SERTRALINE 100 MG: 100 TABLET, FILM COATED ORAL at 20:37

## 2023-04-11 RX ADMIN — ATORVASTATIN CALCIUM 20 MG: 20 TABLET, FILM COATED ORAL at 22:49

## 2023-04-12 LAB
ANION GAP SERPL CALC-SCNC: 8 MMOL/L (ref 5–15)
BASOPHILS # BLD: 0.1 K/UL (ref 0–0.1)
BASOPHILS NFR BLD: 1 % (ref 0–1)
BUN SERPL-MCNC: 24 MG/DL (ref 6–20)
BUN/CREAT SERPL: 18 (ref 12–20)
CALCIUM SERPL-MCNC: 9 MG/DL (ref 8.5–10.1)
CHLORIDE SERPL-SCNC: 102 MMOL/L (ref 97–108)
CHOLEST SERPL-MCNC: 120 MG/DL
CO2 SERPL-SCNC: 30 MMOL/L (ref 21–32)
CREAT SERPL-MCNC: 1.33 MG/DL (ref 0.7–1.3)
DIFFERENTIAL METHOD BLD: ABNORMAL
EOSINOPHIL # BLD: 0.2 K/UL (ref 0–0.4)
EOSINOPHIL NFR BLD: 2 % (ref 0–7)
ERYTHROCYTE [DISTWIDTH] IN BLOOD BY AUTOMATED COUNT: 14 % (ref 11.5–14.5)
EST. AVERAGE GLUCOSE BLD GHB EST-MCNC: 120 MG/DL
GLUCOSE BLD STRIP.AUTO-MCNC: 107 MG/DL (ref 65–117)
GLUCOSE BLD STRIP.AUTO-MCNC: 93 MG/DL (ref 65–117)
GLUCOSE SERPL-MCNC: 102 MG/DL (ref 65–100)
HBA1C MFR BLD: 5.8 % (ref 4–5.6)
HCT VFR BLD AUTO: 37.4 % (ref 36.6–50.3)
HDLC SERPL-MCNC: 51 MG/DL
HDLC SERPL: 2.4 (ref 0–5)
HGB BLD-MCNC: 12.4 G/DL (ref 12.1–17)
IMM GRANULOCYTES # BLD AUTO: 0 K/UL (ref 0–0.04)
IMM GRANULOCYTES NFR BLD AUTO: 0 % (ref 0–0.5)
LDLC SERPL CALC-MCNC: 45.4 MG/DL (ref 0–100)
LYMPHOCYTES # BLD: 2.5 K/UL (ref 0.8–3.5)
LYMPHOCYTES NFR BLD: 26 % (ref 12–49)
MAGNESIUM SERPL-MCNC: 1.9 MG/DL (ref 1.6–2.4)
MCH RBC QN AUTO: 32.6 PG (ref 26–34)
MCHC RBC AUTO-ENTMCNC: 33.2 G/DL (ref 30–36.5)
MCV RBC AUTO: 98.4 FL (ref 80–99)
MONOCYTES # BLD: 0.7 K/UL (ref 0–1)
MONOCYTES NFR BLD: 8 % (ref 5–13)
NEUTS SEG # BLD: 6 K/UL (ref 1.8–8)
NEUTS SEG NFR BLD: 63 % (ref 32–75)
NRBC # BLD: 0 K/UL (ref 0–0.01)
NRBC BLD-RTO: 0 PER 100 WBC
PLATELET # BLD AUTO: 225 K/UL (ref 150–400)
PMV BLD AUTO: 9.7 FL (ref 8.9–12.9)
POTASSIUM SERPL-SCNC: 4 MMOL/L (ref 3.5–5.1)
RBC # BLD AUTO: 3.8 M/UL (ref 4.1–5.7)
SERVICE CMNT-IMP: NORMAL
SERVICE CMNT-IMP: NORMAL
SODIUM SERPL-SCNC: 140 MMOL/L (ref 136–145)
TRIGL SERPL-MCNC: 118 MG/DL (ref ?–150)
VLDLC SERPL CALC-MCNC: 23.6 MG/DL
WBC # BLD AUTO: 9.5 K/UL (ref 4.1–11.1)

## 2023-04-12 PROCEDURE — 97166 OT EVAL MOD COMPLEX 45 MIN: CPT

## 2023-04-12 PROCEDURE — 80061 LIPID PANEL: CPT

## 2023-04-12 PROCEDURE — 80048 BASIC METABOLIC PNL TOTAL CA: CPT

## 2023-04-12 PROCEDURE — 82962 GLUCOSE BLOOD TEST: CPT

## 2023-04-12 PROCEDURE — 74011250637 HC RX REV CODE- 250/637: Performed by: INTERNAL MEDICINE

## 2023-04-12 PROCEDURE — 94760 N-INVAS EAR/PLS OXIMETRY 1: CPT

## 2023-04-12 PROCEDURE — 74011000250 HC RX REV CODE- 250: Performed by: INTERNAL MEDICINE

## 2023-04-12 PROCEDURE — 97530 THERAPEUTIC ACTIVITIES: CPT

## 2023-04-12 PROCEDURE — 96372 THER/PROPH/DIAG INJ SC/IM: CPT

## 2023-04-12 PROCEDURE — G0378 HOSPITAL OBSERVATION PER HR: HCPCS

## 2023-04-12 PROCEDURE — 74011250636 HC RX REV CODE- 250/636: Performed by: INTERNAL MEDICINE

## 2023-04-12 PROCEDURE — 85025 COMPLETE CBC W/AUTO DIFF WBC: CPT

## 2023-04-12 PROCEDURE — 83036 HEMOGLOBIN GLYCOSYLATED A1C: CPT

## 2023-04-12 PROCEDURE — 83735 ASSAY OF MAGNESIUM: CPT

## 2023-04-12 PROCEDURE — 36415 COLL VENOUS BLD VENIPUNCTURE: CPT

## 2023-04-12 PROCEDURE — 97161 PT EVAL LOW COMPLEX 20 MIN: CPT

## 2023-04-12 RX ORDER — TRAZODONE HYDROCHLORIDE 50 MG/1
50 TABLET ORAL
Status: DISCONTINUED | OUTPATIENT
Start: 2023-04-12 | End: 2023-04-18 | Stop reason: HOSPADM

## 2023-04-12 RX ORDER — QUETIAPINE FUMARATE 25 MG/1
50 TABLET, FILM COATED ORAL ONCE
Status: COMPLETED | OUTPATIENT
Start: 2023-04-12 | End: 2023-04-12

## 2023-04-12 RX ADMIN — QUETIAPINE FUMARATE 50 MG: 25 TABLET ORAL at 11:26

## 2023-04-12 RX ADMIN — ENOXAPARIN SODIUM 30 MG: 100 INJECTION SUBCUTANEOUS at 08:55

## 2023-04-12 RX ADMIN — METOPROLOL SUCCINATE 25 MG: 25 TABLET, EXTENDED RELEASE ORAL at 08:55

## 2023-04-12 RX ADMIN — METFORMIN HYDROCHLORIDE 250 MG: 500 TABLET ORAL at 17:42

## 2023-04-12 RX ADMIN — SERTRALINE 100 MG: 100 TABLET, FILM COATED ORAL at 21:23

## 2023-04-12 RX ADMIN — ATORVASTATIN CALCIUM 20 MG: 20 TABLET, FILM COATED ORAL at 21:23

## 2023-04-12 RX ADMIN — SODIUM CHLORIDE, PRESERVATIVE FREE 10 ML: 5 INJECTION INTRAVENOUS at 14:00

## 2023-04-12 RX ADMIN — FAMOTIDINE 20 MG: 20 TABLET, FILM COATED ORAL at 17:42

## 2023-04-12 RX ADMIN — BUPROPION HYDROCHLORIDE 150 MG: 150 TABLET, EXTENDED RELEASE ORAL at 07:07

## 2023-04-12 RX ADMIN — GABAPENTIN 300 MG: 300 CAPSULE ORAL at 08:55

## 2023-04-12 RX ADMIN — SODIUM CHLORIDE, PRESERVATIVE FREE 10 ML: 5 INJECTION INTRAVENOUS at 06:41

## 2023-04-12 RX ADMIN — ENOXAPARIN SODIUM 30 MG: 100 INJECTION SUBCUTANEOUS at 21:23

## 2023-04-12 RX ADMIN — GABAPENTIN 300 MG: 300 CAPSULE ORAL at 17:41

## 2023-04-12 RX ADMIN — LISINOPRIL 10 MG: 10 TABLET ORAL at 08:55

## 2023-04-12 RX ADMIN — SODIUM CHLORIDE, PRESERVATIVE FREE 10 ML: 5 INJECTION INTRAVENOUS at 21:23

## 2023-04-12 RX ADMIN — ASPIRIN 81 MG 81 MG: 81 TABLET ORAL at 08:55

## 2023-04-13 ENCOUNTER — APPOINTMENT (OUTPATIENT)
Dept: ULTRASOUND IMAGING | Age: 83
DRG: 948 | End: 2023-04-13
Attending: INTERNAL MEDICINE
Payer: MEDICARE

## 2023-04-13 PROBLEM — R41.0 DELIRIUM: Status: ACTIVE | Noted: 2023-04-13

## 2023-04-13 PROBLEM — G47.33 OBSTRUCTIVE SLEEP APNEA: Status: ACTIVE | Noted: 2023-04-13

## 2023-04-13 PROBLEM — G47.33 OBSTRUCTIVE SLEEP APNEA: Chronic | Status: ACTIVE | Noted: 2023-04-13

## 2023-04-13 LAB
AMMONIA PLAS-SCNC: 17 UMOL/L
ECHO AO ROOT DIAM: 3.3 CM
ECHO AV MEAN GRADIENT: 7 MMHG
ECHO AV MEAN VELOCITY: 1.3 M/S
ECHO AV PEAK GRADIENT: 14 MMHG
ECHO AV PEAK VELOCITY: 1.9 M/S
ECHO AV VTI: 37.5 CM
ECHO EST RA PRESSURE: 10 MMHG
ECHO LA DIAMETER: 5.2 CM
ECHO LA TO AORTIC ROOT RATIO: 1.58
ECHO LA VOL 2C: 78 ML (ref 18–58)
ECHO LA VOL 4C: 49 ML (ref 18–58)
ECHO LA VOLUME AREA LENGTH: 66 ML
ECHO LV E' SEPTAL VELOCITY: 8 CM/S
ECHO LV FRACTIONAL SHORTENING: 28 % (ref 28–44)
ECHO LV INTERNAL DIMENSION DIASTOLIC: 4.6 CM (ref 4.2–5.9)
ECHO LV INTERNAL DIMENSION SYSTOLIC: 3.3 CM
ECHO LV IVSD: 1 CM (ref 0.6–1)
ECHO LV MASS 2D: 158.8 G (ref 88–224)
ECHO LV POSTERIOR WALL DIASTOLIC: 1 CM (ref 0.6–1)
ECHO LV RELATIVE WALL THICKNESS RATIO: 0.43
ECHO LVOT AREA: 4.2 CM2
ECHO LVOT DIAM: 2.3 CM
ECHO MV A VELOCITY: 1.05 M/S
ECHO MV E DECELERATION TIME (DT): 371 MS
ECHO MV E VELOCITY: 0.73 M/S
ECHO MV E/A RATIO: 0.7
ECHO MV E/E' SEPTAL: 9.13
ECHO RIGHT VENTRICULAR SYSTOLIC PRESSURE (RVSP): 32 MMHG
ECHO TV REGURGITANT MAX VELOCITY: 2.36 M/S
ECHO TV REGURGITANT PEAK GRADIENT: 22 MMHG
GLUCOSE BLD STRIP.AUTO-MCNC: 112 MG/DL (ref 65–117)
GLUCOSE BLD STRIP.AUTO-MCNC: 84 MG/DL (ref 65–117)
SERVICE CMNT-IMP: NORMAL
SERVICE CMNT-IMP: NORMAL
T4 FREE SERPL-MCNC: 1.1 NG/DL (ref 0.8–1.5)
TSH SERPL DL<=0.05 MIU/L-ACNC: 1.43 UIU/ML (ref 0.36–3.74)

## 2023-04-13 PROCEDURE — 94760 N-INVAS EAR/PLS OXIMETRY 1: CPT

## 2023-04-13 PROCEDURE — 84439 ASSAY OF FREE THYROXINE: CPT

## 2023-04-13 PROCEDURE — 97112 NEUROMUSCULAR REEDUCATION: CPT

## 2023-04-13 PROCEDURE — 74011250637 HC RX REV CODE- 250/637: Performed by: INTERNAL MEDICINE

## 2023-04-13 PROCEDURE — 82962 GLUCOSE BLOOD TEST: CPT

## 2023-04-13 PROCEDURE — 36415 COLL VENOUS BLD VENIPUNCTURE: CPT

## 2023-04-13 PROCEDURE — 97535 SELF CARE MNGMENT TRAINING: CPT

## 2023-04-13 PROCEDURE — 82140 ASSAY OF AMMONIA: CPT

## 2023-04-13 PROCEDURE — 99222 1ST HOSP IP/OBS MODERATE 55: CPT | Performed by: PSYCHIATRY & NEUROLOGY

## 2023-04-13 PROCEDURE — G0378 HOSPITAL OBSERVATION PER HR: HCPCS

## 2023-04-13 PROCEDURE — 74011000250 HC RX REV CODE- 250: Performed by: INTERNAL MEDICINE

## 2023-04-13 PROCEDURE — 74011250636 HC RX REV CODE- 250/636: Performed by: INTERNAL MEDICINE

## 2023-04-13 PROCEDURE — 93306 TTE W/DOPPLER COMPLETE: CPT | Performed by: INTERNAL MEDICINE

## 2023-04-13 PROCEDURE — 74011250637 HC RX REV CODE- 250/637: Performed by: PSYCHIATRY & NEUROLOGY

## 2023-04-13 PROCEDURE — 84443 ASSAY THYROID STIM HORMONE: CPT

## 2023-04-13 PROCEDURE — 93306 TTE W/DOPPLER COMPLETE: CPT

## 2023-04-13 PROCEDURE — 96372 THER/PROPH/DIAG INJ SC/IM: CPT

## 2023-04-13 RX ORDER — QUETIAPINE FUMARATE 25 MG/1
50 TABLET, FILM COATED ORAL
Status: DISCONTINUED | OUTPATIENT
Start: 2023-04-13 | End: 2023-04-18 | Stop reason: HOSPADM

## 2023-04-13 RX ORDER — QUETIAPINE FUMARATE 25 MG/1
50 TABLET, FILM COATED ORAL
Status: DISCONTINUED | OUTPATIENT
Start: 2023-04-14 | End: 2023-04-18

## 2023-04-13 RX ADMIN — SODIUM CHLORIDE, PRESERVATIVE FREE 10 ML: 5 INJECTION INTRAVENOUS at 21:05

## 2023-04-13 RX ADMIN — FAMOTIDINE 20 MG: 20 TABLET, FILM COATED ORAL at 17:19

## 2023-04-13 RX ADMIN — SODIUM CHLORIDE, PRESERVATIVE FREE 10 ML: 5 INJECTION INTRAVENOUS at 14:00

## 2023-04-13 RX ADMIN — ATORVASTATIN CALCIUM 20 MG: 20 TABLET, FILM COATED ORAL at 21:03

## 2023-04-13 RX ADMIN — SODIUM CHLORIDE, PRESERVATIVE FREE 10 ML: 5 INJECTION INTRAVENOUS at 06:07

## 2023-04-13 RX ADMIN — TRAZODONE HYDROCHLORIDE 50 MG: 50 TABLET ORAL at 00:45

## 2023-04-13 RX ADMIN — LISINOPRIL 10 MG: 10 TABLET ORAL at 08:24

## 2023-04-13 RX ADMIN — ASPIRIN 81 MG 81 MG: 81 TABLET ORAL at 08:24

## 2023-04-13 RX ADMIN — TRAZODONE HYDROCHLORIDE 50 MG: 50 TABLET ORAL at 21:04

## 2023-04-13 RX ADMIN — ENOXAPARIN SODIUM 30 MG: 100 INJECTION SUBCUTANEOUS at 08:25

## 2023-04-13 RX ADMIN — BUPROPION HYDROCHLORIDE 150 MG: 150 TABLET, EXTENDED RELEASE ORAL at 08:25

## 2023-04-13 RX ADMIN — ENOXAPARIN SODIUM 30 MG: 100 INJECTION SUBCUTANEOUS at 21:04

## 2023-04-13 RX ADMIN — METOPROLOL SUCCINATE 25 MG: 25 TABLET, EXTENDED RELEASE ORAL at 08:24

## 2023-04-13 RX ADMIN — SERTRALINE 100 MG: 100 TABLET, FILM COATED ORAL at 21:03

## 2023-04-13 RX ADMIN — QUETIAPINE FUMARATE 50 MG: 25 TABLET ORAL at 17:19

## 2023-04-14 ENCOUNTER — APPOINTMENT (OUTPATIENT)
Dept: CT IMAGING | Age: 83
DRG: 948 | End: 2023-04-14
Attending: INTERNAL MEDICINE
Payer: MEDICARE

## 2023-04-14 LAB
GLUCOSE BLD STRIP.AUTO-MCNC: 132 MG/DL (ref 65–117)
GLUCOSE BLD STRIP.AUTO-MCNC: 98 MG/DL (ref 65–117)
SERVICE CMNT-IMP: ABNORMAL
SERVICE CMNT-IMP: NORMAL

## 2023-04-14 PROCEDURE — 72131 CT LUMBAR SPINE W/O DYE: CPT

## 2023-04-14 PROCEDURE — 74011250636 HC RX REV CODE- 250/636: Performed by: INTERNAL MEDICINE

## 2023-04-14 PROCEDURE — 74011250637 HC RX REV CODE- 250/637: Performed by: INTERNAL MEDICINE

## 2023-04-14 PROCEDURE — 97535 SELF CARE MNGMENT TRAINING: CPT | Performed by: OCCUPATIONAL THERAPIST

## 2023-04-14 PROCEDURE — 82962 GLUCOSE BLOOD TEST: CPT

## 2023-04-14 PROCEDURE — 74011000250 HC RX REV CODE- 250: Performed by: INTERNAL MEDICINE

## 2023-04-14 PROCEDURE — 99232 SBSQ HOSP IP/OBS MODERATE 35: CPT | Performed by: PSYCHIATRY & NEUROLOGY

## 2023-04-14 PROCEDURE — G0378 HOSPITAL OBSERVATION PER HR: HCPCS

## 2023-04-14 PROCEDURE — 96372 THER/PROPH/DIAG INJ SC/IM: CPT

## 2023-04-14 PROCEDURE — 97110 THERAPEUTIC EXERCISES: CPT

## 2023-04-14 PROCEDURE — 97110 THERAPEUTIC EXERCISES: CPT | Performed by: OCCUPATIONAL THERAPIST

## 2023-04-14 PROCEDURE — 74011250637 HC RX REV CODE- 250/637: Performed by: PSYCHIATRY & NEUROLOGY

## 2023-04-14 PROCEDURE — 94760 N-INVAS EAR/PLS OXIMETRY 1: CPT

## 2023-04-14 RX ADMIN — ENOXAPARIN SODIUM 30 MG: 100 INJECTION SUBCUTANEOUS at 08:36

## 2023-04-14 RX ADMIN — SODIUM CHLORIDE, PRESERVATIVE FREE 10 ML: 5 INJECTION INTRAVENOUS at 14:00

## 2023-04-14 RX ADMIN — QUETIAPINE FUMARATE 50 MG: 25 TABLET ORAL at 14:26

## 2023-04-14 RX ADMIN — ATORVASTATIN CALCIUM 20 MG: 20 TABLET, FILM COATED ORAL at 22:11

## 2023-04-14 RX ADMIN — FAMOTIDINE 20 MG: 20 TABLET, FILM COATED ORAL at 17:07

## 2023-04-14 RX ADMIN — BUPROPION HYDROCHLORIDE 150 MG: 150 TABLET, EXTENDED RELEASE ORAL at 06:32

## 2023-04-14 RX ADMIN — ENOXAPARIN SODIUM 30 MG: 100 INJECTION SUBCUTANEOUS at 22:11

## 2023-04-14 RX ADMIN — ASPIRIN 81 MG 81 MG: 81 TABLET ORAL at 08:36

## 2023-04-14 RX ADMIN — LISINOPRIL 10 MG: 10 TABLET ORAL at 08:36

## 2023-04-14 RX ADMIN — SERTRALINE 100 MG: 100 TABLET, FILM COATED ORAL at 22:11

## 2023-04-14 RX ADMIN — SODIUM CHLORIDE, PRESERVATIVE FREE 10 ML: 5 INJECTION INTRAVENOUS at 06:32

## 2023-04-14 RX ADMIN — METOPROLOL SUCCINATE 25 MG: 25 TABLET, EXTENDED RELEASE ORAL at 08:36

## 2023-04-15 LAB
GLUCOSE BLD STRIP.AUTO-MCNC: 100 MG/DL (ref 65–117)
GLUCOSE BLD STRIP.AUTO-MCNC: 98 MG/DL (ref 65–117)
SERVICE CMNT-IMP: NORMAL
SERVICE CMNT-IMP: NORMAL

## 2023-04-15 PROCEDURE — 74011250637 HC RX REV CODE- 250/637: Performed by: INTERNAL MEDICINE

## 2023-04-15 PROCEDURE — 96372 THER/PROPH/DIAG INJ SC/IM: CPT

## 2023-04-15 PROCEDURE — 74011250636 HC RX REV CODE- 250/636: Performed by: INTERNAL MEDICINE

## 2023-04-15 PROCEDURE — 82962 GLUCOSE BLOOD TEST: CPT

## 2023-04-15 PROCEDURE — 94760 N-INVAS EAR/PLS OXIMETRY 1: CPT

## 2023-04-15 PROCEDURE — G0378 HOSPITAL OBSERVATION PER HR: HCPCS

## 2023-04-15 PROCEDURE — 97110 THERAPEUTIC EXERCISES: CPT

## 2023-04-15 RX ORDER — LISINOPRIL 5 MG/1
5 TABLET ORAL DAILY
Status: DISCONTINUED | OUTPATIENT
Start: 2023-04-15 | End: 2023-04-18 | Stop reason: HOSPADM

## 2023-04-15 RX ADMIN — BUPROPION HYDROCHLORIDE 150 MG: 150 TABLET, EXTENDED RELEASE ORAL at 11:01

## 2023-04-15 RX ADMIN — SERTRALINE 100 MG: 100 TABLET, FILM COATED ORAL at 21:02

## 2023-04-15 RX ADMIN — QUETIAPINE FUMARATE 50 MG: 25 TABLET ORAL at 17:02

## 2023-04-15 RX ADMIN — ASPIRIN 81 MG 81 MG: 81 TABLET ORAL at 09:30

## 2023-04-15 RX ADMIN — ENOXAPARIN SODIUM 30 MG: 100 INJECTION SUBCUTANEOUS at 21:02

## 2023-04-15 RX ADMIN — ENOXAPARIN SODIUM 30 MG: 100 INJECTION SUBCUTANEOUS at 09:30

## 2023-04-15 RX ADMIN — ATORVASTATIN CALCIUM 20 MG: 20 TABLET, FILM COATED ORAL at 21:02

## 2023-04-15 RX ADMIN — TRAZODONE HYDROCHLORIDE 50 MG: 50 TABLET ORAL at 21:02

## 2023-04-15 RX ADMIN — METOPROLOL SUCCINATE 25 MG: 25 TABLET, EXTENDED RELEASE ORAL at 09:30

## 2023-04-15 RX ADMIN — LISINOPRIL 5 MG: 5 TABLET ORAL at 09:30

## 2023-04-15 RX ADMIN — POLYETHYLENE GLYCOL 3350 17 G: 17 POWDER, FOR SOLUTION ORAL at 17:02

## 2023-04-15 RX ADMIN — FAMOTIDINE 20 MG: 20 TABLET, FILM COATED ORAL at 17:02

## 2023-04-16 PROBLEM — R41.0 CONFUSION: Status: ACTIVE | Noted: 2023-04-16

## 2023-04-16 PROBLEM — R29.6 FALLS: Status: ACTIVE | Noted: 2023-04-16
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
## 2023-04-16 PROCEDURE — 65270000029 HC RM PRIVATE

## 2023-04-16 PROCEDURE — 96372 THER/PROPH/DIAG INJ SC/IM: CPT

## 2023-04-16 PROCEDURE — 74011250636 HC RX REV CODE- 250/636: Performed by: INTERNAL MEDICINE

## 2023-04-16 PROCEDURE — G0378 HOSPITAL OBSERVATION PER HR: HCPCS

## 2023-04-16 PROCEDURE — 74011250637 HC RX REV CODE- 250/637: Performed by: INTERNAL MEDICINE

## 2023-04-16 PROCEDURE — 74011250637 HC RX REV CODE- 250/637: Performed by: PSYCHIATRY & NEUROLOGY

## 2023-04-16 PROCEDURE — 94760 N-INVAS EAR/PLS OXIMETRY 1: CPT

## 2023-04-16 RX ADMIN — LISINOPRIL 5 MG: 5 TABLET ORAL at 10:00

## 2023-04-16 RX ADMIN — BUPROPION HYDROCHLORIDE 150 MG: 150 TABLET, EXTENDED RELEASE ORAL at 10:03

## 2023-04-16 RX ADMIN — TRAZODONE HYDROCHLORIDE 50 MG: 50 TABLET ORAL at 22:16

## 2023-04-16 RX ADMIN — QUETIAPINE FUMARATE 50 MG: 25 TABLET ORAL at 16:20

## 2023-04-16 RX ADMIN — ENOXAPARIN SODIUM 30 MG: 100 INJECTION SUBCUTANEOUS at 22:15

## 2023-04-16 RX ADMIN — ENOXAPARIN SODIUM 30 MG: 100 INJECTION SUBCUTANEOUS at 10:01

## 2023-04-16 RX ADMIN — FAMOTIDINE 20 MG: 20 TABLET, FILM COATED ORAL at 18:46

## 2023-04-16 RX ADMIN — ASPIRIN 81 MG 81 MG: 81 TABLET ORAL at 10:01

## 2023-04-16 RX ADMIN — QUETIAPINE FUMARATE 50 MG: 25 TABLET ORAL at 10:01

## 2023-04-16 RX ADMIN — METOPROLOL SUCCINATE 25 MG: 25 TABLET, EXTENDED RELEASE ORAL at 10:01

## 2023-04-16 RX ADMIN — SERTRALINE 100 MG: 100 TABLET, FILM COATED ORAL at 22:16

## 2023-04-16 RX ADMIN — ATORVASTATIN CALCIUM 20 MG: 20 TABLET, FILM COATED ORAL at 22:16

## 2023-04-17 PROCEDURE — 97530 THERAPEUTIC ACTIVITIES: CPT

## 2023-04-17 PROCEDURE — 94760 N-INVAS EAR/PLS OXIMETRY 1: CPT

## 2023-04-17 PROCEDURE — 65270000029 HC RM PRIVATE

## 2023-04-17 PROCEDURE — G0378 HOSPITAL OBSERVATION PER HR: HCPCS

## 2023-04-17 PROCEDURE — 74011250637 HC RX REV CODE- 250/637: Performed by: PSYCHIATRY & NEUROLOGY

## 2023-04-17 PROCEDURE — 74011250637 HC RX REV CODE- 250/637: Performed by: INTERNAL MEDICINE

## 2023-04-17 PROCEDURE — 74011250636 HC RX REV CODE- 250/636: Performed by: INTERNAL MEDICINE

## 2023-04-17 PROCEDURE — 97110 THERAPEUTIC EXERCISES: CPT

## 2023-04-17 PROCEDURE — 97535 SELF CARE MNGMENT TRAINING: CPT

## 2023-04-17 RX ORDER — SERTRALINE HYDROCHLORIDE 50 MG/1
50 TABLET, FILM COATED ORAL
Status: DISCONTINUED | OUTPATIENT
Start: 2023-04-17 | End: 2023-04-18 | Stop reason: HOSPADM

## 2023-04-17 RX ADMIN — QUETIAPINE FUMARATE 50 MG: 25 TABLET ORAL at 21:49

## 2023-04-17 RX ADMIN — ENOXAPARIN SODIUM 30 MG: 100 INJECTION SUBCUTANEOUS at 08:48

## 2023-04-17 RX ADMIN — METOPROLOL SUCCINATE 25 MG: 25 TABLET, EXTENDED RELEASE ORAL at 08:48

## 2023-04-17 RX ADMIN — SERTRALINE 50 MG: 50 TABLET, FILM COATED ORAL at 21:50

## 2023-04-17 RX ADMIN — ATORVASTATIN CALCIUM 20 MG: 20 TABLET, FILM COATED ORAL at 21:51

## 2023-04-17 RX ADMIN — QUETIAPINE FUMARATE 50 MG: 25 TABLET ORAL at 13:51

## 2023-04-17 RX ADMIN — ASPIRIN 81 MG 81 MG: 81 TABLET ORAL at 08:48

## 2023-04-17 RX ADMIN — TRAZODONE HYDROCHLORIDE 50 MG: 50 TABLET ORAL at 19:32

## 2023-04-17 RX ADMIN — FAMOTIDINE 20 MG: 20 TABLET, FILM COATED ORAL at 18:05

## 2023-04-17 RX ADMIN — BUPROPION HYDROCHLORIDE 150 MG: 150 TABLET, EXTENDED RELEASE ORAL at 07:34

## 2023-04-17 RX ADMIN — LISINOPRIL 5 MG: 5 TABLET ORAL at 08:48

## 2023-04-17 NOTE — PROGRESS NOTES
IDR Team; MD, Nursing, Care Manager, Nursing manager, Pharmacy and  met to review patient's plan of care. Discussed goals, interventions, barriers and progress. Team will continue to monitor progress and report any concerns to the physician and care management as indicated. Transition of Care Plan:  Per MD, patient still has fluctuating confusion. Going to decrease psych meds. Negative MRI. Nothing obvious going on. RN said patient has been having hard time sleeping at night. CM said we spoke with patient's daughter on Friday afternoon late--around 4pm. Daughter said she works during the day. Her sister will be with patient but only temporarily. She agreed to have auth started. CM started auth on Friday at 1624. Still pending. Waiting to hear back.

## 2023-04-17 NOTE — PROGRESS NOTES
Problem: Mobility Impaired (Adult and Pediatric)  Goal: *Acute Goals and Plan of Care (Insert Text)  Description: FUNCTIONAL STATUS PRIOR TO ADMISSION: pt was very confused and subsequently he was a limited and questionable historian; his dtr Katharina arrived later in the day and was able to help clarify details about SH and PLOF; pt lives alone in a 1 level apt; 4 SHREE w/ B HR; he had been ambulatory w/ a quad cane until about a month ago but due to coordination issues and falling pt had started using a rollator; he has a tub shower combo at home w/ a shower seat and suction grab bars; again due to the coordination issues and fear of falling pt has not been showering much lately and has been opting for a sink \"bird bath\"; pt was intermittently driving up until a few weeks ago but has expressed to his dtr that he thinks he needs to stop driving completely; both pt and dtr express that pt has been MI for B IADLs up to this point including cooking, cleaning and shopping; it was also noted that pt occasionally sleeps in his recliner vs the bed due to back pain    HOME SUPPORT PRIOR TO ADMISSION: dtr lives local and assists PRN but until recently pt has not required much help    Physical Therapy Goals  Initiated 4/12/2023  1. Patient will move from supine to sit and sit to supine  in bed with independence within 7 day(s). 2.  Patient will transfer from bed to chair and chair to bed with SBA using the least restrictive device within 7 day(s). 3.  Patient will perform sit to stand with SBA within 7 day(s). 4.  Patient will ambulate with SBA for 200 feet with the least restrictive device within 7 day(s). 5.  Patient will ascend/descend 4 stairs with 1-2 handrail(s) with SBA within 7 day(s).   Outcome: Not Progressing Towards Goal     Problem: Patient Education: Go to Patient Education Activity  Goal: Patient/Family Education  Outcome: Not Progressing Towards Goal  PHYSICAL THERAPY TREATMENT  Patient: Tana Bergeronrick (82 y.o. male)  Date: 4/17/2023  Diagnosis: Weakness [R53.1]  Falls [W19. XXXA]  Confusion [R41.0] Ground-level fall      Precautions: Bed Alarm, Fall, ataxic movement w/ impaired balance  Chart, physical therapy assessment, plan of care and goals were reviewed. ASSESSMENT  Patient continues with skilled PT services and is not progressing towards goals. Pt presented resting in the recliner chair; pleasant and cooperative overall. He noted that he needed to go to the bathroom upon therapist arrival. Subsequently, therapist took pt's vitals and then while preparing to help pt into the bathroom he stood up on his own; chair alarm began to sound. Vitals noted to be: /55, O2 sats on RA 94% and HR 66. Therapist re-iterated to him that he needs to wait for assistance and AD currently due to his increased fall risk; he acknowledged understanding. Once in the bathroom pt appeared to have some difficulty w/ motor planning and sequencing to be able to successfully go to the bathroom. W/ some noted difficulty he was able to get his sweat pants down but then sat sideways on the commode seat w/ his pull up brief still in place; he did not seem to notice that there was an issue w/ him still wearing the brief. Due to his mobility issues at that time while on the toilet therapist had to rip the pull up in half to remove it. Once he urinated he was able to attend to his pericare needs. Therapist provided another pull up and pt was assisted w/ removing the pants and tennis shoes to be able to place a new clean and dry brief the he was assisted w/ donning the pants and shoes again; max A required. Then once ready pt was assisted to standing and wanted to stand at the sink to wash his hands. He was able to do the washing and drying of his hands but then got off balance and his legs appeared to be nearly giving out on him. He was unable to properly turn around even w/ therapist's myriad of instructions and assistance.  Pt ended up briefly leaning/sitting on edge of bathroom sink and therapist was able to get him turned around enough to be able to walk w/ 2WW out of the bathroom and then to the recliner chair; mod A required to avoid falling. High fall risk noted in the presence of ataxia, impaired balance and very questionable safety awareness. While seated therapist took his vitals again but they were all unremarkable (/65, HR 68 and O2 sats on RA 97%). After a seated rest break and a few minutes to discuss what had just occurred it was agreed to try walking again. Pt stood up w/ min A and 2WW and attempted to walk to the door of the room. Initially he was ok but quickly his ataxia began again and he was instructed to stop and to sit down; therapist provided recliner follow when moving to help ensure safety. Nursing and CM were informed of sessions events. Pt was left resting in recliner chair w/ LEs elevated and both tray table and call bell in reach; chair alarm on. Current Level of Function Impacting Discharge (mobility/balance): min A to mod A; balance w/ AD poor (+) to poor    Other factors to consider for discharge: supportive dtr         PLAN :  Patient continues to benefit from skilled intervention to address the above impairments. Continue treatment per established plan of care. to address goals. Recommendation for discharge: (in order for the patient to meet his/her long term goals)  Therapy up to 5 days/week in SNF setting    This discharge recommendation:  Has been made in collaboration with the attending provider and/or case management    IF patient discharges home will need the following DME: to be determined (TBD)       SUBJECTIVE:   Patient stated I'm fine, fine, fine.     OBJECTIVE DATA SUMMARY:   Critical Behavior:  Neurologic State: Alert  Orientation Level: Oriented X4  Cognition: Impaired decision making, Impulsive, Follows commands  Safety/Judgement: Decreased insight into deficits, Fall prevention  Functional Mobility Training:  Bed Mobility:      NT              Transfers:  Sit to Stand: Minimum assistance  Stand to Sit: Minimum assistance; Moderate assistance                             Balance:  Sitting - Static: Fair (occasional)  Sitting - Dynamic: Fair (occasional)  Standing: Impaired; With support  Standing - Static: Constant support;Fair;Occasional  Standing - Dynamic : Constant support;Occasional;Poor  Ambulation/Gait Training:  Distance (ft): 35 Feet (ft) (10' x2 reps from recliner chair to commode then back; then an additional 15' from bed to doorway w/ 2WW)  Assistive Device: Gait belt;Walker, rolling  Ambulation - Level of Assistance: Moderate assistance;Minimal assistance        Gait Abnormalities: Ataxic;Decreased step clearance; Path deviations; Shuffling gait; Step to gait        Base of Support: Widened     Speed/Padma: Fluctuations  Step Length: Left shortened;Right shortened            Pain Rating:  Denied overall    Activity Tolerance:   Fair, Poor, SpO2 stable on RA, and requires frequent rest breaks    After treatment patient left in no apparent distress:   Sitting in chair, Heels elevated for pressure relief, Call bell within reach, and Bed / chair alarm activated    COMMUNICATION/COLLABORATION:   The patients plan of care was discussed with: Occupational therapist, Registered nurse, Case management, and Certified nursing assistant/patient care technician.      Nick Cole, PT   Time Calculation: 42 mins

## 2023-04-17 NOTE — PROGRESS NOTES
Problem: Mobility Impaired (Adult and Pediatric)  Goal: *Acute Goals and Plan of Care (Insert Text)  Description: FUNCTIONAL STATUS PRIOR TO ADMISSION: pt was very confused and subsequently he was a limited and questionable historian; his dtr Katharina arrived later in the day and was able to help clarify details about SH and PLOF; pt lives alone in a 1 level apt; 4 SHREE w/ B HR; he had been ambulatory w/ a quad cane until about a month ago but due to coordination issues and falling pt had started using a rollator; he has a tub shower combo at home w/ a shower seat and suction grab bars; again due to the coordination issues and fear of falling pt has not been showering much lately and has been opting for a sink \"bird bath\"; pt was intermittently driving up until a few weeks ago but has expressed to his dtr that he thinks he needs to stop driving completely; both pt and dtr express that pt has been MI for B IADLs up to this point including cooking, cleaning and shopping; it was also noted that pt occasionally sleeps in his recliner vs the bed due to back pain    HOME SUPPORT PRIOR TO ADMISSION: dtr lives local and assists PRN but until recently pt has not required much help    Physical Therapy Goals  Initiated 4/12/2023  1. Patient will move from supine to sit and sit to supine  in bed with independence within 7 day(s). 2.  Patient will transfer from bed to chair and chair to bed with SBA using the least restrictive device within 7 day(s). 3.  Patient will perform sit to stand with SBA within 7 day(s). 4.  Patient will ambulate with SBA for 200 feet with the least restrictive device within 7 day(s). 5.  Patient will ascend/descend 4 stairs with 1-2 handrail(s) with SBA within 7 day(s).     4/17/2023 1718 by Barbara Carpenter PT  Outcome: Not Progressing Towards Goal  4/17/2023 1652 by Barbara Carpenter PT  Outcome: Not Progressing Towards Goal     Problem: Patient Education: Go to Patient Education Activity  Goal: Patient/Family Education  4/17/2023 1718 by Frederic Curling, PT  Outcome: Not Progressing Towards Goal  4/17/2023 1652 by Frederic Curling, PT  Outcome: Not Progressing Towards Goal  PHYSICAL THERAPY TREATMENT  Patient: Taty Villeda (26 y.o. male)  Date: 4/17/2023  Diagnosis: Weakness [R53.1]  Falls [W19. XXXA]  Confusion [R41.0] Ground-level fall      Precautions: Bed Alarm, Fall  Chart, physical therapy assessment, plan of care and goals were reviewed. ASSESSMENT  Patient continues with skilled PT services and is not progressing towards goals. Pt presented still sitting in recliner chair from earlier session; 1:1 sitter in hallway noted that pt had been trying to get up w/o assistance. It was decided to help pt get back to bed to rest. Therapist offered cues for safety and technique and pt was able to stand from recliner chair using 2WW then transferred over to the bed; ataxic movement and unsteady but no fall or SOB. He was assisted back down to bed and was positioned for comfort. He was left resting watching MLB w/ both tray table and call bell in reach; bed alarm on. Vitals noted during session included: O2 sats 95% and HR 67.      Current Level of Function Impacting Discharge (mobility/balance): transfers fluctuate from CGA to mod A; balance w/ AD poor to poor (+)    Other factors to consider for discharge: supportive dtr         PLAN :  Patient continues to benefit from skilled intervention to address the above impairments. Continue treatment per established plan of care. to address goals. Recommendation for discharge: (in order for the patient to meet his/her long term goals)  Therapy up to 5 days/week in SNF setting    This discharge recommendation:  Has been made in collaboration with the attending provider and/or case management    IF patient discharges home will need the following DME: to be determined (TBD)       SUBJECTIVE:   Patient stated I'd like to lay down for a while.     OBJECTIVE DATA SUMMARY:   Critical Behavior:  Neurologic State: Alert  Orientation Level: Oriented X4  Cognition: Impaired decision making, Impulsive, Follows commands  Safety/Judgement: Decreased awareness of need for safety, Fall prevention  Functional Mobility Training:  Bed Mobility:        Sit to Supine: Minimum assistance           Transfers:  Sit to Stand: Minimum assistance  Stand to Sit: Minimum assistance        Bed to Chair: Minimum assistance                    Balance:  Sitting: Intact  Sitting - Static: Fair (occasional)  Sitting - Dynamic: Fair (occasional)  Standing: Impaired; With support  Standing - Static: Constant support;Occasional;Fair  Standing - Dynamic : Constant support;Occasional;Poor  Ambulation/Gait Training:  Distance (ft): 35 Feet (ft) (10' x2 reps from recliner chair to commode then back; then an additional 15' from bed to doorway w/ 2WW)  Assistive Device: Gait belt;Walker, rolling  Ambulation - Level of Assistance: Moderate assistance;Minimal assistance        Gait Abnormalities: Ataxic;Decreased step clearance; Path deviations; Shuffling gait; Step to gait        Base of Support: Widened     Speed/Padma: Fluctuations  Step Length: Left shortened;Right shortened         Pain Rating:  Denied overall     Activity Tolerance:   Fair, Poor, SpO2 stable on RA, and requires frequent rest breaks    After treatment patient left in no apparent distress:   Supine in bed, Call bell within reach, Bed / chair alarm activated, and Side rails x 3    COMMUNICATION/COLLABORATION:   The patients plan of care was discussed with: Registered nurse, Case management, and Certified nursing assistant/patient care technician.      Marcel Hayes, PT   Time Calculation: 10 mins

## 2023-04-17 NOTE — PROGRESS NOTES
0818: Checked status of authorization via [facility]. Still pending. 1155: Spoke with Tanya Iverson from [facility]. She confirmed NPI and information. She will be reviewing case and will let us know. 1412: Received call from Efe at [facility]. Patient was denied. She recommended a peer to peer which has to be done by 12pm on 4/18. If this done could change the denial.  Phone number is [phone]     Called patient's daughter Ms. Anahy Nicholson to make her aware. No answer. Left a message. Likely the peer to peer will be denied. It is highly likely patient will be discharged tomorrow      1436: Spoke with Ms. Anahy Nicholson and made her aware of situation. Told her he would be discharged tomorrow if peer to peer is denied.

## 2023-04-17 NOTE — PROGRESS NOTES
Patient noted to be getting out of bed without assistance. Wet brief found under patients bed, side of patients bed saturated with urine. Patients socks were in the trash with his gown down to his knees. Patient stated \"I was trying to get to the house. Patient cleaned up and put back in bed. Floor mopped and disinfected. Approximately 7 minutes later patient noted to be attempting to get out of bed once again, patient redirected. Patient laid down and closed his eyes.

## 2023-04-17 NOTE — PROGRESS NOTES
Spiritual Care Assessment/Progress Note  Andreas Flores      NAME: Veronica Shields      MRN: [id number]  AGE: 82 y.o.  SEX: male  Buddhist Affiliation: Sikhism   Language: English     4/17/2023     Total Time (in minutes): 10     Spiritual Assessment begun in [facility] through conversation with:         [x]Patient        [] Family    [] Friend(s)        Reason for Consult: Initial/Spiritual assessment, patient floor     Spiritual beliefs: (Please include comment if needed)     [x] Identifies with a michelle tradition:         [] Supported by a michelle community:            [] Claims no spiritual orientation:           [] Seeking spiritual identity:                [] Adheres to an individual form of spirituality:           [] Not able to assess:                           Identified resources for coping:      [] Prayer                               [] Music                  [] Guided Imagery     [x] Family/friends                 [] Pet visits     [] Devotional reading                         [] Unknown     [] Other:                                               Interventions offered during this visit: (See comments for more details)    Patient Interventions: Affirmation of michelle, Affirmation of emotions/emotional suffering, Iconic (affirming the presence of God/Higher Power), Initial/Spiritual assessment, patient floor, Prayer (assurance of)           Plan of Care:     [x] Support spiritual and/or cultural needs    [] Support AMD and/or advance care planning process      [] Support grieving process   [] Coordinate Rites and/or Rituals    [] Coordination with community clergy   [] No spiritual needs identified at this time   [] Detailed Plan of Care below (See Comments)  [] Make referral to Music Therapy  [] Make referral to Pet Therapy     [] Make referral to Addiction services  [] Make referral to [facility]  [] Make referral to Spiritual Care Partner  [] No future visits requested        [] Contact Spiritual Care for further referrals     Comments: Initial spiritual assessment in Rm 130  Provided  empathic listening and spiritual support  Advised of  Availability    [address] [place] [address]

## 2023-04-17 NOTE — PROGRESS NOTES
[facility]  Hospitalist Progress Note    NAME: Radha Snider   :  1940   MRN:  [id number]     Total duration of encounter: 6 days      Admission HPI/Hospitalization Summary To Date:  As well described previously with no change in history  82year-old male admitted with falls at home on 2023 with a ground-level fall, along with 3-weeks per patient of confusion which he clearly recognizes as being intermittent and unusual.  He reports \"hallucinations\" but has not been more specific as to what type. 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 After admission, he is seen by therapies, noted to be impulsive and unsteady. There had been continued issues with delirium which seems to have improved though to just intermittent confusion. Overall this seemingly has improved. .Head CT  shows no acute intracranial abnormality mild diffuse cortical volume loss with chronic microvascular ischemic change. MRI  shows minimal chronic microvascular ischemic disease, small chronic infarcts left thalamus and right cerebellum. There is no acute infarct/abnormality. Carotid duplex  showed mild right and left ICA stenosis. Because of issues with delirium, he underwent psychiatric consult on  felt to have mixed delirium which may be starting to clear. According to PCP outpatient note on 3/14/2023, Wellbutrin had been recently increased to 300, and Neurontin was restarted for diabetic neuropathy twice daily. Here his Wellbutrin was decreased back to 150, and Neurontin stopped altogether. Patient has been ambulating, and seems better though as per therapy notes still benefiting from therapy. Still has significant issues. Daughter has been involved in discussion with case management has not felt as if the patient can go home with her as she works.        Subjective:     Reason for provider encounter today:    Follow-up hospitalized patient for assessment and management of multiple problems as listed      Chief Complaint :    He has no complaints today states he is doing fine. Review of Systems:       General:                     No fever, chills, sweats, generalized weakness, anorexia     Eyes:                 No photophobia, eye pain, or visual changes     ENT:      No rhinorrhea, pharyngitis      Respiratory:    No cough, sputum production, SOB, BONILLA, wheezing, chest pain      Cardiology:               No chest pain, palpitations, orthopnea, PND, edema, syncope      Gastrointestinal:     No abdominal pain , N/V, diarrhea, dysphagia, constipation, bleeding      Genitourinary:     No frequency, urgency, dysuria, hematuria, incontinence, prostatism      Muskuloskeletal:    No arthralgia, myalgia, back pain     Hematology:     No easy bruising, nose or gum bleeding, lymphadenopathy      Dermatological:    No rash, ulceration, pruritis, color change / jaundice     Neurological:     No headache, dizziness, confusion, focal weakness    paresthesia, speech difficulties, memory loss, gait difficulty     Psychological:    No feelings of anxiety, depression, agitation      PMH/SH:  Reviewed when deemed appropriate. No change from that contained in H and P and past records except as otherwise stated      Objective:     VITALS:        Patient Vitals for the past 12 hrs:   Temp Pulse Resp BP SpO2   04/17/23 0703 97.6 °F (36.4 °C) 86 18 128/60 96 %          Intake/Output Summary (Last 24 hours) at 4/17/2023 1309  Last data filed at 4/17/2023 1200  Gross per 24 hour   Intake 480 ml   Output --   Net 480 ml          PHYSICAL EXAM:       General: WDWN  Alert,  Cooperative   NAD      EENT:         EOMI   Anicteric Sclerae   No Pallor  MMM     Resp: No resp distress  CTA bilaterally,  No wheezing or rales.   Air movement good    No accessory muscle use     CV:  RRR,  S1-S2 normal without M/R/G GI:  Soft,  Non distended   Non tender   +Bowel sounds   Generally benign     Neurologic:  Alert and oriented X      Normal speech   Grossly intact   Moving all 4 extremities. Vascular: Intact distally     Musculoskeletal/extremities: No evidence  for DVT   No cyanosis arthritis or joint      abnormality,       Psych:   He is somewhat tangential, and forgetful. However he is able to engage interact. He knows where he is and the year not the month. Skin:  No rashes. No jaundice      LABS:    I reviewed the following today and incorporated them into my assessment/plan/medical decision making:     I reviewed pertinent lab test results and cultures  YES     I reviewed pertinent radiology/diagnostic data test results   YES            Pertinent lab results include:            No results for input(s): WBC, HGB, HCT, PLT, HGBEXT, HCTEXT, PLTEXT in the last 72 hours. No results for input(s): NA, K, CL, CO2, GLU, BUN, CREA, CA, MG, PHOS, ALB, TBIL, TBILI, ALT, INR, INREXT in the last 72 hours. No lab exists for component: SGOT                       Assessment / Plan/Medical Decision Making:    *Toxic drug monitoring          no  *Discussed case with-see below      Principal Problem:    Ground-level fall (4/11/2023)  Active Problems:    Altered mental state (4/11/2023)    Delirium    Dysequilibrium (4/11/2023)  Peripheral neuropathy  -MRI noted for old thalamic and R cerebellar infarcts (not present on prior CT 4/22)  -Carotid Duplex with mild disease  -No evidence for acute neurologic deficits  -  Telemetry w/o arrhythmia / Afib now discontinued  -He has been ambulating since admission with overall improvement but still with some physical fluctuation  -However he has continuesdto have intermittent bouts of confusion and mild behavioral disturbance  -Daughter has expressed desire to take him home but has become clear that this going to be very difficult given his physical and mental limitations.   -D Case management now working on alternative disposition at least short-term  Regarding his acute delirium;  -Any number of possible etiologies exist though suspect high in the differential is iatrogenic related to recent increase in Wellbutrin and reinitiation of Neurontin as an outpatient. Also consider cognitive issues, orthostasis, complicated all by peripheral neuropathy, could also consider myelopathy. -TSH B12 normal  -Lumbar spine CT shows no evidence of acute fracture or aggressive osseous lesion and multiple levels of degenerative changes please see report. These are not surprising given his age. There is a partially imaged aortic aneurysm suboptimally evaluated and a right adrenal adenoma  Plan:  -CM to begin exploring alternative disposition at least short-term. Daughters expressed that she is not capable to take him home but she as she works during the day  -I remain concerned about iatrogenic issues will decrease Zoloft to 50 daily and  -Placing here in [address] [place] [address] is a consideration that would allow us to evaluate tapering his medications even more  -Dr. Hugo Johnson suggested follow up with Damion Haile NP -  Alondra Fritz to further evaluate medication modification.  -Neurology consult recommended by Dr. Kamilla Luis as outpatient  -Consider targeting Seroquel and trazodone as primary medications in the future        Hypercholesterolemia (2/25/2013)  Fasting panel good  Using Mevacor 40mg daily - dose with Lipitor 20mg during admission  Consider upgrade in tx       Hypertension  Cont Metoprolol XL 12.5mg daily, Lisinopril 10mg daily.   Holding HCTZ will not restart blood pressure well controlled  -Was not orthostatic per nursing by direct communication to me  Plans:      DC HCTZ indefinitely       Type 2 diabetes mellitus treated without insulin ([facility]) ()  -These have been well controlled while here  -Hemoglobin A1c 5.8.  -This suggests that control at home is excellent,  -Metformin was discontinued, and agree that on discharge this should continue to be discontinued. Plan: Outpatient follow-up for diabetes           Anxiety and depression (6/17/2013)  -There is concern that his Wellbutrin which was recently increased to 300 mg as a etiology of his falls. Also Neurontin had been started see above  Patient was seen by psychiatry  -Wellbutrin was decreased and Neurontin discontinued see above discussion  -Current medications 50 mg q. dinner, Zoloft 100 nightly Wellbutrin 150 every morning, as needed Seroquel every 6 hours and trazodone 50 mg nightly as needed  Plan: Decrease Zoloft to 50. If he is here in TCU we can continue to evaluate appropriateness of further medication adjustment         Obstructive Sleep Apnea  Per testing 2021 with low Oxim 87%  Needs Neuro follow up, may need home nocturnal Oxygen        Decreased GFR  -Unknown if this is acute or chronic. This is early                ___________________________________________________________________    SAFETY:   Code Status:Full  DVT prophylaxis:Lovenox  Bladder catheter:no  Family Contact Info:  Primary Emergency Contact: Collette Carmel, Home Phone: [phone]  Disposition: TBD, likely home when stable  Admission status:  Inpatient          Care Plan discussed with:                                   Comments  Patient x     Family      RN  Other  Members  Care Team   (specify)  x       Care Manager  x     Consultant                          x Multidiciplinary team rounds were held today with , nursing, pharmacist and clinical coordinator. Patient's plan of care was discussed; medications were reviewed and discharge planning was addressed.         ____________________________________________            Signed: Surinder Tucker MD  [facility] Hospitalist  [phone]

## 2023-04-17 NOTE — PROGRESS NOTES
Problem: Self Care Deficits Care Plan (Adult)  Goal: *Acute Goals and Plan of Care (Insert Text)  Description: FUNCTIONAL STATUS PRIOR TO ADMISSION: Patient was modified independent using a walker and  quad cane for functional mobility. HOME SUPPORT: Pt lives alone and did not require assistance. Occupational Therapy Goals  Initiated 4/12/2023  1. Patient will perform grooming with supervision/set-up within 7 day(s). 2.  Patient will perform bathing with supervision/set-up within 7 day(s). 3.  Patient will perform lower body dressing with supervision/set-up within 7 day(s). 4.  Patient will perform toilet transfers with modified independence within 7 day(s). 5.  Patient will perform all aspects of toileting with modified independence within 7 day(s). 6.  Patient will participate in upper extremity therapeutic exercise/activities with supervision/set-up for 8 minutes within 7 day(s). 7.  Patient will utilize energy conservation techniques during functional activities with verbal cues within 7 day(s). Outcome: Progressing Towards Goal   OCCUPATIONAL THERAPY TREATMENT  Patient: Bruce Ward (16 y.o. male)  Date: 4/17/2023  Diagnosis: Weakness [R53.1]  Falls [W19. XXXA]  Confusion [R41.0] Ground-level fall      Precautions: Bed Alarm, Fall  Chart, occupational therapy assessment, plan of care, and goals were reviewed. ASSESSMENT  Patient continues with skilled OT services and is progressing towards goals. Pt is up in chair, claims he's not had a wash up but CNA said it was offered/partially performed in the a.m. She'd offered to assist with shaving and he declined. He thinks he is suppose to go home today. OTR informed pt a D/C summary is not in his chart and CM is attempting to get approval for rehab. As pt needs 24/7 supervision due to balance issues. He acknowledges issues with balance and that his daughter works making it impossible to have 24 hour supervision.   He ambulated to bathroom and needed cues at the sink to brush teeth. He then engaged in dressing activity/practice and light exercise. Current Level of Function Impacting Discharge (ADLs): SBA bathroom mobility; supervision at the sink with cues for brushing teeth and washing face. Leans against the wall on R side in bathroom. Leans sitting in chair to the L and needs cues more than once to correct. Tried to hold onto walker with L hand closed and needed cue. Needed cue to step into the walker. Set-up for pull over shirt; Min assist to get elastic waist and elastic leg closure pants on with cues. Min assist to get on sock L and cue for R sock. Able to put on shoes but dependent to tie laces. Has velcro closure shoes at home. Got SOB with light exercise. Other factors to consider for discharge: lives alone and is still confused, thinking he as at a doctor's office. PLAN :  Patient continues to benefit from skilled intervention to address the above impairments. Continue treatment per established plan of care to address goals. Recommend with staff: supervision for safety when up on feet, patient skin from metatarsal distal bilaterally but worse on L skin is cold and purplish. Pt reports this is normal. Did have capillary refill with light compression. Recommend next OT session: continue working on self care/balance endurance for standing at sink     Recommendation for discharge: (in order for the patient to meet his/her long term goals)  Therapy up to 5 days/week in SNF setting    This discharge recommendation:  Has been made in collaboration with the attending provider and/or case management    IF patient discharges home will need the following DME: patient owns DME required for discharge       SUBJECTIVE:   Patient stated Becky Carlos said I was going home. [de-identified]  \"tell my daughter she can go if I'm not going home\" Pt thought his daughter was waiting for him outside the room and she is not present.     OBJECTIVE DATA SUMMARY:   Cognitive/Behavioral Status:  Neurologic State: Alert  Orientation Level: Oriented X4  Cognition: Follows commands             Functional Mobility and Transfers for ADLs:  Bed Mobility:       Transfers:  Sit to Stand: Stand-by assistance  Functional Transfers  Bathroom Mobility: Contact guard assistance       Balance:  Sitting - Static: Fair (occasional)  Sitting - Dynamic: Fair (occasional)  Standing: Impaired; With support  Standing - Static: Constant support; Fair  Standing - Dynamic : Constant support; Fair    ADL Intervention:       Grooming  Position Performed: Standing  Washing Face: Supervision  Brushing Teeth: Supervision         Type of Bath: Chlorhexidine (CHG); Basin/Soap/Water; Bath Pack         Upper Body Dressing Assistance  Pullover Shirt: Set-up    Lower Body Dressing Assistance  Pants With Elastic Waist: Minimum assistance  Socks: Minimum assistance  Shoes with Cloth Laces: Minimum assistance  Leg Crossed Method Used: Yes  Position Performed: Seated in chair              Therapeutic Exercises:   Performed seated sit-ups x 10 with SOB   Performed 10 arm circles forward and back and needed physical assist for the backward coordination movement  Performed 10 seated march in place    Pain:  No complaint    Activity Tolerance:   observed SOB with activity    After treatment patient left in no apparent distress:   Sitting in chair, Call bell within reach, and Bed / chair alarm activated    COMMUNICATION/COLLABORATION:   The patients plan of care was discussed with: Physical therapist, Case management, and Certified nursing assistant/patient care technician.      [facility], OT  Time Calculation: 30 mins

## 2023-04-17 NOTE — PROGRESS NOTES
Bedside shift change report given to CORKY Bhakta RN (oncoming nurse) by Beatris Harvey RN  (offgoing nurse). Report included the following information Kardex.

## 2023-04-17 NOTE — PROGRESS NOTES
Bedside and Verbal shift change report given to [place] (oncoming nurse) by [place] (offgoing nurse). Report included the following information SBAR, Kardex, ED Summary, OR Summary, MAR, Med Rec Status, Alarm Parameters , and Quality Measures.

## 2023-04-17 NOTE — PROGRESS NOTES
Problem: Falls - Risk of  Goal: *Absence of Falls  Description: Document Lan Mendenhall Fall Risk and appropriate interventions in the flowsheet. Outcome: Progressing Towards Goal  Note: Fall Risk Interventions:                                Problem: Patient Education: Go to Patient Education Activity  Goal: Patient/Family Education  Outcome: Progressing Towards Goal     Problem: Pressure Injury - Risk of  Goal: *Prevention of pressure injury  Description: Document Eddie Scale and appropriate interventions in the flowsheet. Outcome: Progressing Towards Goal  Note: Pressure Injury Interventions:  Sensory Interventions: Assess changes in LOC    Moisture Interventions: Absorbent underpads    Activity Interventions: Increase time out of bed    Mobility Interventions: Turn and reposition approx.  every two hours(pillow and wedges)    Nutrition Interventions: Document food/fluid/supplement intake    Friction and Shear Interventions: Apply protective barrier, creams and emollients                Problem: Patient Education: Go to Patient Education Activity  Goal: Patient/Family Education  Outcome: Progressing Towards Goal

## 2023-04-17 NOTE — PROGRESS NOTES
Problem: Falls - Risk of  Goal: *Absence of Falls  Description: Document Sherron Kirk Fall Risk and appropriate interventions in the flowsheet.   Outcome: Progressing Towards Goal  Note: Fall Risk Interventions:                                Problem: Patient Education: Go to Patient Education Activity  Goal: Patient/Family Education  Outcome: Progressing Towards Goal     Problem: Ischemic Stroke: Discharge Outcomes  Goal: *Verbalizes anxiety and depression are reduced or absent  Outcome: Progressing Towards Goal  Goal: *Verbalize understanding of risk factor modification(Stroke Metric)  Outcome: Progressing Towards Goal  Goal: *Hemodynamically stable  Outcome: Progressing Towards Goal  Goal: *Absence of aspiration pneumonia  Outcome: Progressing Towards Goal  Goal: *Aware of needed dietary changes  Outcome: Progressing Towards Goal  Goal: *Verbalize understanding of prescribed medications including anti-coagulants, anti-lipid, and/or anti-platelets(Stroke Metric)  Outcome: Progressing Towards Goal  Goal: *Tolerating diet  Outcome: Progressing Towards Goal  Goal: *Aware of follow-up diagnostics related to anticoagulants  Outcome: Progressing Towards Goal  Goal: *Ability to perform ADLs and demonstrates progressive mobility and function  Outcome: Progressing Towards Goal  Goal: *Absence of DVT(Stroke Metric)  Outcome: Progressing Towards Goal  Goal: *Absence of aspiration  Outcome: Progressing Towards Goal  Goal: *Optimal pain control at patient's stated goal  Outcome: Progressing Towards Goal  Goal: *Home safety concerns addressed  Outcome: Progressing Towards Goal  Goal: *Describes available resources and support systems  Outcome: Progressing Towards Goal  Goal: *Verbalizes understanding of activation of EMS(911) for stroke symptoms(Stroke Metric)  Outcome: Progressing Towards Goal  Goal: *Understands and describes signs and symptoms to report to providers(Stroke Metric)  Outcome: Progressing Towards Goal  Goal: *Neurolgocially stable (absence of additional neurological deficits)  Outcome: Progressing Towards Goal  Goal: *Verbalizes importance of follow-up with primary care physician(Stroke Metric)  Outcome: Progressing Towards Goal  Goal: *Smoking cessation discussed,if applicable(Stroke Metric)  Outcome: Progressing Towards Goal  Goal: *Depression screening completed(Stroke Metric)  Outcome: Progressing Towards Goal     Problem: Pressure Injury - Risk of  Goal: *Prevention of pressure injury  Description: Document Eddie Scale and appropriate interventions in the flowsheet. Outcome: Progressing Towards Goal  Note: Pressure Injury Interventions:  Sensory Interventions: Assess changes in LOC, Check visual cues for pain, Maintain/enhance activity level    Moisture Interventions: Absorbent underpads, Apply protective barrier, creams and emollients, Check for incontinence Q2 hours and as needed    Activity Interventions: Chair cushion, Increase time out of bed    Mobility Interventions: Turn and reposition approx.  every two hours(pillow and wedges), Chair cushion    Nutrition Interventions: Document food/fluid/supplement intake    Friction and Shear Interventions: Apply protective barrier, creams and emollients                Problem: Patient Education: Go to Patient Education Activity  Goal: Patient/Family Education  Outcome: Progressing Towards Goal

## 2023-04-18 ENCOUNTER — HOSPITAL ENCOUNTER (INPATIENT)
Age: 83
LOS: 10 days | Discharge: HOME HEALTH | End: 2023-04-28
Attending: INTERNAL MEDICINE | Admitting: INTERNAL MEDICINE
Payer: MEDICARE

## 2023-04-18 VITALS
HEART RATE: 70 BPM | BODY MASS INDEX: 34.18 KG/M2 | HEIGHT: 72 IN | TEMPERATURE: 98.2 F | SYSTOLIC BLOOD PRESSURE: 138 MMHG | DIASTOLIC BLOOD PRESSURE: 64 MMHG | RESPIRATION RATE: 18 BRPM | OXYGEN SATURATION: 96 %

## 2023-04-18 PROBLEM — R53.81 PHYSICAL DECONDITIONING: Status: ACTIVE | Noted: 2023-04-18

## 2023-04-18 PROBLEM — R53.1 GENERALIZED WEAKNESS: Status: ACTIVE | Noted: 2023-04-18

## 2023-04-18 LAB
ANION GAP SERPL CALC-SCNC: 7 MMOL/L (ref 5–15)
BASOPHILS # BLD: 0.1 K/UL (ref 0–0.1)
BASOPHILS NFR BLD: 1 % (ref 0–1)
BUN SERPL-MCNC: 31 MG/DL (ref 6–20)
BUN/CREAT SERPL: 25 (ref 12–20)
CALCIUM SERPL-MCNC: 8.6 MG/DL (ref 8.5–10.1)
CHLORIDE SERPL-SCNC: 105 MMOL/L (ref 97–108)
CO2 SERPL-SCNC: 29 MMOL/L (ref 21–32)
CREAT SERPL-MCNC: 1.26 MG/DL (ref 0.7–1.3)
DIFFERENTIAL METHOD BLD: ABNORMAL
EOSINOPHIL # BLD: 0.1 K/UL (ref 0–0.4)
EOSINOPHIL NFR BLD: 1 % (ref 0–7)
ERYTHROCYTE [DISTWIDTH] IN BLOOD BY AUTOMATED COUNT: 13.7 % (ref 11.5–14.5)
GLUCOSE BLD STRIP.AUTO-MCNC: 99 MG/DL (ref 65–117)
GLUCOSE SERPL-MCNC: 104 MG/DL (ref 65–100)
HCT VFR BLD AUTO: 37.9 % (ref 36.6–50.3)
HGB BLD-MCNC: 12.2 G/DL (ref 12.1–17)
IMM GRANULOCYTES # BLD AUTO: 0.1 K/UL (ref 0–0.04)
IMM GRANULOCYTES NFR BLD AUTO: 1 % (ref 0–0.5)
LYMPHOCYTES # BLD: 1.7 K/UL (ref 0.8–3.5)
LYMPHOCYTES NFR BLD: 16 % (ref 12–49)
MAGNESIUM SERPL-MCNC: 2.1 MG/DL (ref 1.6–2.4)
MCH RBC QN AUTO: 31.9 PG (ref 26–34)
MCHC RBC AUTO-ENTMCNC: 32.2 G/DL (ref 30–36.5)
MCV RBC AUTO: 99 FL (ref 80–99)
MONOCYTES # BLD: 0.7 K/UL (ref 0–1)
MONOCYTES NFR BLD: 7 % (ref 5–13)
NEUTS SEG # BLD: 7.9 K/UL (ref 1.8–8)
NEUTS SEG NFR BLD: 74 % (ref 32–75)
NRBC # BLD: 0 K/UL (ref 0–0.01)
NRBC BLD-RTO: 0 PER 100 WBC
PLATELET # BLD AUTO: 222 K/UL (ref 150–400)
PMV BLD AUTO: 9.2 FL (ref 8.9–12.9)
POTASSIUM SERPL-SCNC: 4.7 MMOL/L (ref 3.5–5.1)
RBC # BLD AUTO: 3.83 M/UL (ref 4.1–5.7)
SERVICE CMNT-IMP: NORMAL
SODIUM SERPL-SCNC: 141 MMOL/L (ref 136–145)
WBC # BLD AUTO: 10.6 K/UL (ref 4.1–11.1)

## 2023-04-18 PROCEDURE — 93005 ELECTROCARDIOGRAM TRACING: CPT

## 2023-04-18 PROCEDURE — 74011250637 HC RX REV CODE- 250/637: Performed by: INTERNAL MEDICINE

## 2023-04-18 PROCEDURE — 94760 N-INVAS EAR/PLS OXIMETRY 1: CPT

## 2023-04-18 PROCEDURE — G0378 HOSPITAL OBSERVATION PER HR: HCPCS

## 2023-04-18 PROCEDURE — 74011250636 HC RX REV CODE- 250/636: Performed by: INTERNAL MEDICINE

## 2023-04-18 PROCEDURE — 97535 SELF CARE MNGMENT TRAINING: CPT

## 2023-04-18 PROCEDURE — 83735 ASSAY OF MAGNESIUM: CPT

## 2023-04-18 PROCEDURE — 85025 COMPLETE CBC W/AUTO DIFF WBC: CPT

## 2023-04-18 PROCEDURE — 80048 BASIC METABOLIC PNL TOTAL CA: CPT

## 2023-04-18 PROCEDURE — 36415 COLL VENOUS BLD VENIPUNCTURE: CPT

## 2023-04-18 PROCEDURE — 82962 GLUCOSE BLOOD TEST: CPT

## 2023-04-18 PROCEDURE — 65270000011 HC RM PRIVATE SNF

## 2023-04-18 PROCEDURE — 97110 THERAPEUTIC EXERCISES: CPT

## 2023-04-18 RX ORDER — TRAZODONE HYDROCHLORIDE 50 MG/1
50 TABLET ORAL
Status: CANCELLED | OUTPATIENT
Start: 2023-04-18

## 2023-04-18 RX ORDER — ENOXAPARIN SODIUM 100 MG/ML
30 INJECTION SUBCUTANEOUS EVERY 12 HOURS
Status: DISCONTINUED | OUTPATIENT
Start: 2023-04-18 | End: 2023-04-28 | Stop reason: HOSPADM

## 2023-04-18 RX ORDER — ACETAMINOPHEN 650 MG/1
650 SUPPOSITORY RECTAL
Status: DISCONTINUED | OUTPATIENT
Start: 2023-04-18 | End: 2023-04-28 | Stop reason: HOSPADM

## 2023-04-18 RX ORDER — QUETIAPINE FUMARATE 25 MG/1
75 TABLET, FILM COATED ORAL
Status: DISCONTINUED | OUTPATIENT
Start: 2023-04-18 | End: 2023-04-20

## 2023-04-18 RX ORDER — GUAIFENESIN 100 MG/5ML
81 LIQUID (ML) ORAL DAILY
Status: DISCONTINUED | OUTPATIENT
Start: 2023-04-19 | End: 2023-04-28 | Stop reason: HOSPADM

## 2023-04-18 RX ORDER — LISINOPRIL 5 MG/1
5 TABLET ORAL DAILY
Status: DISCONTINUED | OUTPATIENT
Start: 2023-04-19 | End: 2023-04-28 | Stop reason: HOSPADM

## 2023-04-18 RX ORDER — QUETIAPINE FUMARATE 25 MG/1
50 TABLET, FILM COATED ORAL
Status: DISCONTINUED | OUTPATIENT
Start: 2023-04-18 | End: 2023-04-28 | Stop reason: HOSPADM

## 2023-04-18 RX ORDER — ACETAMINOPHEN 650 MG/1
650 SUPPOSITORY RECTAL
Status: CANCELLED | OUTPATIENT
Start: 2023-04-18

## 2023-04-18 RX ORDER — BUPROPION HYDROCHLORIDE 100 MG/1
100 TABLET, EXTENDED RELEASE ORAL DAILY
Status: DISCONTINUED | OUTPATIENT
Start: 2023-04-19 | End: 2023-04-18 | Stop reason: HOSPADM

## 2023-04-18 RX ORDER — BUPROPION HYDROCHLORIDE 100 MG/1
100 TABLET, EXTENDED RELEASE ORAL DAILY
Status: CANCELLED | OUTPATIENT
Start: 2023-04-19

## 2023-04-18 RX ORDER — METOPROLOL SUCCINATE 25 MG/1
25 TABLET, EXTENDED RELEASE ORAL DAILY
Status: CANCELLED | OUTPATIENT
Start: 2023-04-19

## 2023-04-18 RX ORDER — QUETIAPINE FUMARATE 25 MG/1
75 TABLET, FILM COATED ORAL
Status: CANCELLED | OUTPATIENT
Start: 2023-04-18

## 2023-04-18 RX ORDER — LISINOPRIL 5 MG/1
5 TABLET ORAL DAILY
Status: CANCELLED | OUTPATIENT
Start: 2023-04-19

## 2023-04-18 RX ORDER — ACETAMINOPHEN 325 MG/1
650 TABLET ORAL
Status: DISCONTINUED | OUTPATIENT
Start: 2023-04-18 | End: 2023-04-28 | Stop reason: HOSPADM

## 2023-04-18 RX ORDER — SERTRALINE HYDROCHLORIDE 50 MG/1
50 TABLET, FILM COATED ORAL
Status: CANCELLED | OUTPATIENT
Start: 2023-04-18

## 2023-04-18 RX ORDER — TRAZODONE HYDROCHLORIDE 50 MG/1
50 TABLET ORAL
Status: DISCONTINUED | OUTPATIENT
Start: 2023-04-18 | End: 2023-04-28 | Stop reason: HOSPADM

## 2023-04-18 RX ORDER — ATORVASTATIN CALCIUM 20 MG/1
20 TABLET, FILM COATED ORAL
Status: DISCONTINUED | OUTPATIENT
Start: 2023-04-18 | End: 2023-04-28 | Stop reason: HOSPADM

## 2023-04-18 RX ORDER — GUAIFENESIN 100 MG/5ML
81 LIQUID (ML) ORAL DAILY
Status: CANCELLED | OUTPATIENT
Start: 2023-04-19

## 2023-04-18 RX ORDER — QUETIAPINE FUMARATE 25 MG/1
50 TABLET, FILM COATED ORAL
Status: CANCELLED | OUTPATIENT
Start: 2023-04-18

## 2023-04-18 RX ORDER — ATORVASTATIN CALCIUM 20 MG/1
20 TABLET, FILM COATED ORAL
Status: CANCELLED | OUTPATIENT
Start: 2023-04-18

## 2023-04-18 RX ORDER — POLYETHYLENE GLYCOL 3350 17 G/17G
17 POWDER, FOR SOLUTION ORAL DAILY PRN
Status: CANCELLED | OUTPATIENT
Start: 2023-04-18

## 2023-04-18 RX ORDER — ENOXAPARIN SODIUM 100 MG/ML
30 INJECTION SUBCUTANEOUS EVERY 12 HOURS
Status: CANCELLED | OUTPATIENT
Start: 2023-04-18

## 2023-04-18 RX ORDER — QUETIAPINE FUMARATE 25 MG/1
75 TABLET, FILM COATED ORAL
Status: DISCONTINUED | OUTPATIENT
Start: 2023-04-18 | End: 2023-04-18 | Stop reason: HOSPADM

## 2023-04-18 RX ORDER — BUPROPION HYDROCHLORIDE 100 MG/1
100 TABLET, EXTENDED RELEASE ORAL DAILY
Status: DISCONTINUED | OUTPATIENT
Start: 2023-04-19 | End: 2023-04-20

## 2023-04-18 RX ORDER — ACETAMINOPHEN 325 MG/1
650 TABLET ORAL
Status: CANCELLED | OUTPATIENT
Start: 2023-04-18

## 2023-04-18 RX ORDER — SERTRALINE HYDROCHLORIDE 50 MG/1
50 TABLET, FILM COATED ORAL
Status: DISCONTINUED | OUTPATIENT
Start: 2023-04-18 | End: 2023-04-28 | Stop reason: HOSPADM

## 2023-04-18 RX ORDER — POLYETHYLENE GLYCOL 3350 17 G/17G
17 POWDER, FOR SOLUTION ORAL DAILY PRN
Status: DISCONTINUED | OUTPATIENT
Start: 2023-04-18 | End: 2023-04-28 | Stop reason: HOSPADM

## 2023-04-18 RX ORDER — METOPROLOL SUCCINATE 25 MG/1
25 TABLET, EXTENDED RELEASE ORAL DAILY
Status: DISCONTINUED | OUTPATIENT
Start: 2023-04-19 | End: 2023-04-28 | Stop reason: HOSPADM

## 2023-04-18 RX ADMIN — ASPIRIN 81 MG 81 MG: 81 TABLET ORAL at 09:37

## 2023-04-18 RX ADMIN — BUPROPION HYDROCHLORIDE 150 MG: 150 TABLET, EXTENDED RELEASE ORAL at 09:37

## 2023-04-18 RX ADMIN — ENOXAPARIN SODIUM 30 MG: 100 INJECTION SUBCUTANEOUS at 21:03

## 2023-04-18 RX ADMIN — ENOXAPARIN SODIUM 30 MG: 100 INJECTION SUBCUTANEOUS at 09:40

## 2023-04-18 RX ADMIN — TRAZODONE HYDROCHLORIDE 50 MG: 50 TABLET ORAL at 21:03

## 2023-04-18 RX ADMIN — QUETIAPINE FUMARATE 75 MG: 25 TABLET ORAL at 17:21

## 2023-04-18 RX ADMIN — SERTRALINE 50 MG: 50 TABLET, FILM COATED ORAL at 21:03

## 2023-04-18 RX ADMIN — ATORVASTATIN CALCIUM 20 MG: 20 TABLET, FILM COATED ORAL at 21:03

## 2023-04-18 RX ADMIN — LISINOPRIL 5 MG: 5 TABLET ORAL at 09:37

## 2023-04-18 RX ADMIN — METOPROLOL SUCCINATE 25 MG: 25 TABLET, EXTENDED RELEASE ORAL at 09:37

## 2023-04-18 NOTE — PROGRESS NOTES
Follow up visit with patient in Rm 130  Provided empathic listening and spiritual support  Advised of  Availability    [address] [place] [address]

## 2023-04-18 NOTE — PROGRESS NOTES
Problem: Self Care Deficits Care Plan (Adult)  Goal: *Acute Goals and Plan of Care (Insert Text)  Description: FUNCTIONAL STATUS PRIOR TO ADMISSION: Patient was modified independent using a walker and  quad cane for functional mobility. HOME SUPPORT: Pt lives alone and did not require assistance. Occupational Therapy Goals  Initiated 4/12/2023  1. Patient will perform grooming with supervision/set-up within 7 day(s). 2.  Patient will perform bathing with supervision/set-up within 7 day(s). 3.  Patient will perform lower body dressing with supervision/set-up within 7 day(s). 4.  Patient will perform toilet transfers with modified independence within 7 day(s). 5.  Patient will perform all aspects of toileting with modified independence within 7 day(s). 6.  Patient will participate in upper extremity therapeutic exercise/activities with supervision/set-up for 8 minutes within 7 day(s). 7.  Patient will utilize energy conservation techniques during functional activities with verbal cues within 7 day(s). Outcome: Not Progressing Towards Goal   OCCUPATIONAL THERAPY TREATMENT  Patient: Kolton Max (89 y.o. male)  Date: 4/18/2023  Diagnosis: Weakness [R53.1]  Falls [W19. XXXA]  Confusion [R41.0] Ground-level fall      Precautions: Bed Alarm, Fall  Chart, occupational therapy assessment, plan of care, and goals were reviewed. ASSESSMENT  Patient continues with skilled OT services and is not progressing towards goals. Pt willing to take a shower in room using shower chair. He needs cues for sit to stand and leans back against chair. He needed cues to sit on commode for undressing and was dependent to get socks and undergarment off feet. He needs cues and CGA with extra time to step into shower using grab bar. He sits on chair and needs set-up of wash cloth and step by step instructions for washing himself. Able to use grab bar and reach ankles. He is CGA and cues stepping out.  He had difficulty readjusting seated position on the commode for drying and dressing. Unable to get clothing on feet or over feet sitting on the commode but in the chair was able with cues to get legs through pants. Set-up for getting on pul over shirt. Dominguez hair w/comb provided to him. Current Level of Function Impacting Discharge (ADLs): Pt still having incontinence and needing to wear undergarment. He is unaware he is soiled and unable to change undergarment in bathroom. He is unsafe for independent bathroom mobility as wants to abandon walker and needs cues on use for safety. He had difficulty showering asking for directions for what to do next. He fatigued with the activity. Dependent for socks on/off today. L leg weaker for R for raising leg for assist with socks as well as stepping over lip of walk in shower. Other factors to consider for discharge: lives alone         PLAN :  Patient continues to benefit from skilled intervention to address the above impairments. Continue treatment per established plan of care to address goals. Recommend with staff: continue monitoring patient and assisting. May benefit from [facility] frame over toilet to raise toilet height    Recommend next OT session: continue working on independence in functional ADL and transfer    Recommendation for discharge: (in order for the patient to meet his/her long term goals)  Therapy up to 5 days/week in SNF setting    This discharge recommendation:  Has been made in collaboration with the attending provider and/or case management    IF patient discharges home will need the following DME: bedside commode       SUBJECTIVE:   Patient stated [place] do you want me to do next.     OBJECTIVE DATA SUMMARY:   Cognitive/Behavioral Status:      Difficulty recognizing steps for self care shower/washing  Difficulty problem solving how to get up from toilet even though he'd done so with instruction earlier in session                Functional Mobility and Transfers for ADLs:      Transfers:  Sit to Stand: Contact guard assistance  Functional Transfers  Bathroom Mobility: Contact guard assistance  Shower Transfer: Contact guard assistance       Balance:  Sitting: Intact  Standing: Impaired; With support  Standing - Static: Constant support; Fair  Standing - Dynamic : Constant support;Poor    ADL Intervention:       Grooming  Position Performed: Seated in chair  Brushing/Combing Hair: Set-up    Upper Body Bathing  Bathing Assistance: Set-up; Supervision  Position Performed: Seated in chair (in shower)  Adaptive Equipment: Shower chair;Grab bar    Type of Bath: Chlorhexidine (CHG); Basin/Soap/Water; Bath Pack; Shower    Lower Body Bathing  Perineal  : Minimum assistance  Position Performed: Standing  Adaptive Equipment: Grab bar  Lower Body : Minimum assistance;Set-up  Position Performed: Seated in chair  Adaptive Equipment: Shower chair;Grab bar    Upper Body Dressing Assistance  Pullover Shirt: Set-up    Lower Body Dressing Assistance  Protective Undergarmet: Maximum assistance  Pants With Elastic Waist: Minimum assistance  Socks: Total assistance (dependent)  Position Performed: Seated in chair    Toileting  Clothing Management: Minimum assistance  Cues: Verbal cues provided  Adaptive Equipment: Grab bars; Walker      Pain:  0/10    Activity Tolerance:   Fair and requires rest breaks    After treatment patient left in no apparent distress:   Sitting in chair, Call bell within reach, and Caregiver / family present    COMMUNICATION/COLLABORATION:   The patients plan of care was discussed with: Certified nursing assistant/patient care technician.      Lolly Leary OT  Time Calculation: 30 mins

## 2023-04-18 NOTE — PROGRESS NOTES
RESULTS OF DISCHARGE DELAY:    4/18/23    Received decision from patient's insurance company that they denied patient skilled care services. Requested a Peer to Peer be done. Dr. Anthony Andre did the Peer to Peer on 4/18/23 and got the decision overturned. Patient is admitted to [facility] today, 4/18/23 thanks to Dr. Anthony Andre and the Peer to Peer that was done on the patient's behalf.

## 2023-04-18 NOTE — PROGRESS NOTES
Patient awake, agitated and calling out to friend not here and confused/disoriented. Refusing Lovenox injection, grabbing arm to stop. Med not given. Medicated with Seroquil  as ordered. Frequent reminders to stay in bed. Sitter out side of room.

## 2023-04-18 NOTE — PROGRESS NOTES
Bedside shift change report given to JAGRUTI Silveira RN (oncoming nurse) by Carlos Finnegan RN  (offgoing nurse).  Report included the following information to include SBAR, MAR, VS.

## 2023-04-18 NOTE — PROGRESS NOTES
EKG done by RT per doctor order. Looking at QTC to determine medication regime. Last  but 6 days ago. Patient is less agitated at this time and dozing off intermittently.

## 2023-04-18 NOTE — PROGRESS NOTES
Problem: Falls - Risk of  Goal: *Absence of Falls  Description: Document Kirsty Cobble Fall Risk and appropriate interventions in the flowsheet. Outcome: Progressing Towards Goal  Note: Fall Risk Interventions:  Mobility Interventions: Bed/chair exit alarm, Patient to call before getting OOB    Mentation Interventions: Bed/chair exit alarm    Medication Interventions: Bed/chair exit alarm, Patient to call before getting OOB    Elimination Interventions: Call light in reach, Bed/chair exit alarm, Toileting schedule/hourly rounds              Problem: Patient Education: Go to Patient Education Activity  Goal: Patient/Family Education  Outcome: Progressing Towards Goal     Problem: Pressure Injury - Risk of  Goal: *Prevention of pressure injury  Description: Document Eddie Scale and appropriate interventions in the flowsheet. Outcome: Progressing Towards Goal  Note: Pressure Injury Interventions:  Sensory Interventions: Assess changes in LOC    Moisture Interventions: Absorbent underpads    Activity Interventions: Increase time out of bed    Mobility Interventions: Turn and reposition approx.  every two hours(pillow and wedges)    Nutrition Interventions: Document food/fluid/supplement intake    Friction and Shear Interventions: Apply protective barrier, creams and emollients

## 2023-04-18 NOTE — PROGRESS NOTES
Patient incontinent. Allowed staff to clean him up and was cooperative. Resting quietly after linen and clothes changed.

## 2023-04-18 NOTE — PROGRESS NOTES
IDR Team; MD, Nursing, Care Manager, occupational therapy, Nursing Supervisor, , Pharmacy and Dietician, met to review patient's plan of care. Discussed goals, interventions, barriers and progress. Team will continue to monitor progress and report any concerns to the physician and care management as indicated. Transition of Care Plan: Per MD, will do peer to peer for insurance company. Going to increase Seroquel at dinner time. Pharmacy putting in orders to increase Seroquel from 50 to 75 at dinner time. RN said patient is pleasant this morning. OT said patient stands at sink but has to lean on wall. Recommending skilled care. 2124: Updated PT/OT notes uploaded to [employer]. 1135: Dr. Anthony Andre did peer to peer and he said it was APPROVED. Waiting to hear back from nurse/rep  from insurance company (need to know how many days approved, etc)       1335: Received approval days from [employer]. Patient approved from 4/18-4/24. Called patient's daughter to make her aware. She didn't answer. Left a message.

## 2023-04-18 NOTE — PROGRESS NOTES
[employer] to Dr. Ashley Prince to inform of increasing agitation, fighting care and trying to get out of bed. Request order for sedation.

## 2023-04-18 NOTE — PROGRESS NOTES
Care Management Interventions  PCP Verified by CM: Yes Afton Callander MD)  Last Visit to PCP: 03/14/20  Palliative Care Criteria Met (RRAT>21 & CHF Dx)?: No (No MD order)  Mode of Transport at Discharge: Other (see comment) (POV)  Transition of Care Consult (CM Consult): Discharge Planning  MyChart Signup: No  Discharge Durable Medical Equipment: No  Physical Therapy Consult: Yes  Occupational Therapy Consult: Yes  Speech Therapy Consult: No  Support Systems: Child(liliam) (dtr Katharina lives about 6 miles away)  Confirm Follow Up Transport: Family  The Plan for Transition of Care is Related to the Following Treatment Goals : Treat weakness  Discharge Location  Patient Expects to be Discharged to[de-identified] Skilled nursing facility (Swing bed at [facility])    Patient is being discharged from acute care stay and admitted to swing bed stay here at [facility]. Patient's goal is to gain strength, mobility and endurance prior to returning home. Patient has been approved 4/18-4/24. This time will also give the daughter opportunity to plan for him to return home whether it be with sitters, AL or LTC facility. At this point patient needs 24/7 care. Patient and family told to contact CM for any questions or concerns. Transition of Care (MARY GRACE) Plan:  Swing bed     MARY GRACE Transportation:  n/a       Follow-up appointment and transportation: Hospitalist will follow up with patient at least once a week. More if clinically indicated. Communication plan (with patient/family): Patient/family aware and agree with plan. Who is being called? Patient or Next of Kin? Responsible party? Patient       What number(s) is to be used? [phone]      What service provider is calling for [employer] services? [facility]       When are they calling?  Unknown       Click here to complete [address] including selection of the Healthcare Decision Maker Relationship (ie \"Primary\")  [email or web address]      Transition of Care Plan to SNF/Rehab    SNF/Rehab Transition:  Patient has been accepted to [facility] swing bed and meets criteria for admission. Communication to Patient/Family:  Met with patient and Elda Rome (identified care giver) and they are agreeable to the transition plan. Communication to SNF/Rehab:  Bedside RN, Mony Hernandez, has been notified to update the transition plan to the facility  Discharge information has been updated on the AVS.         Nursing Please include all hard scripts for controlled substances, med rec and dc summary, and AVS in packet. Reviewed and confirmed with facility, [place] [id number]. bed program, can manage the patient care needs for the following:     Lori Bautista with (X) only those applicable:    Medication:  [x]  Medications will be available at the facility  []  IV Antibiotics   []  Controlled Substance - hard copy to be sent with patient   []  Weekly Labs   Documents:  [] Hard RX  [x] MAR  [] Kardex  [] AVS  []Transfer Summary  []Discharge   Equipment:  []  CPAP/BiPAP  []  Wound Vacuum  []  Garcia or Urinary Device  []  PICC/Central Line  []  Nebulizer  []  Ventilator   Treatment:  []Isolation (for MRSA, VRE, etc.)  []Surgical Drain Management  []Tracheostomy Care  []Dressing Changes  []Dialysis with transportation and chair time   []PEG Care  []Oxygen  []Daily Weights for Heart Failure   Dietary:  []Any diet limitations  []Tube Feedings   []Total Parenteral Management (TPN)   Eligible for Medicaid Long Term Services and Supports  Yes:  [] Eligible for medical assistance or will become eligible within 180 days and UAI completed. [] Provider/Patient and/or support system has requested screening. [] UAI copy provided to patient or responsible party,   [] UAI unavailable at discharge will send once processed to SNF provider. [] UAI unavailable at discharged mailed to patient  No:   [x] Private pay and is not financially eligible for Medicaid within the next 180 days.   [] Reside out-of-state.   [] A residents of a state owned/operated facility that is licensed  by [address] [facility] or [facility]  [] Enrollment in [facility] services  [] [address] [place] [address] [address]  [] Patient /Family declines to have screening completed or provide financial information for screening     Financial Resources:  Medicaid    [] Initiated and application pending   [] Full coverage     Advanced Care Plan:  []Surrogate Decision Maker of Care  []POA  []Communicated Code Status FULL    Other

## 2023-04-18 NOTE — PROGRESS NOTES
Patient has been cooperative with care throughout shift thus far but is confused and impulsive at times. Sitter in hallway has been in patients room multiple times to reorient or respond to patients chair or bed alarm when patient tries to get up without assistance. Patient currently sitting up in chair with alarm in place. Call bell within reach.

## 2023-04-18 NOTE — PROGRESS NOTES
Problem: Mobility Impaired (Adult and Pediatric)  Goal: *Acute Goals and Plan of Care (Insert Text)  Description: FUNCTIONAL STATUS PRIOR TO ADMISSION: pt was very confused and subsequently he was a limited and questionable historian; his dtr Katharina arrived later in the day and was able to help clarify details about SH and PLOF; pt lives alone in a 1 level apt; 4 SHREE w/ B HR; he had been ambulatory w/ a quad cane until about a month ago but due to coordination issues and falling pt had started using a rollator; he has a tub shower combo at home w/ a shower seat and suction grab bars; again due to the coordination issues and fear of falling pt has not been showering much lately and has been opting for a sink \"bird bath\"; pt was intermittently driving up until a few weeks ago but has expressed to his dtr that he thinks he needs to stop driving completely; both pt and dtr express that pt has been MI for B IADLs up to this point including cooking, cleaning and shopping; it was also noted that pt occasionally sleeps in his recliner vs the bed due to back pain    HOME SUPPORT PRIOR TO ADMISSION: dtr lives local and assists PRN but until recently pt has not required much help    Physical Therapy Goals  Initiated 4/12/2023  1. Patient will move from supine to sit and sit to supine  in bed with independence within 7 day(s). 2.  Patient will transfer from bed to chair and chair to bed with SBA using the least restrictive device within 7 day(s). 3.  Patient will perform sit to stand with SBA within 7 day(s). 4.  Patient will ambulate with SBA for 200 feet with the least restrictive device within 7 day(s). 5.  Patient will ascend/descend 4 stairs with 1-2 handrail(s) with SBA within 7 day(s). Outcome: Progressing Towards Goal     Problem: Patient Education: Go to Patient Education Activity  Goal: Patient/Family Education  Outcome: Progressing Towards Goal  PHYSICAL THERAPY TREATMENT  Patient: Ryan Teri (89 y.o. male)  Date: 4/18/2023  Diagnosis: Weakness [R53.1]  Falls [W19. XXXA]  Confusion [R41.0] Ground-level fall      Precautions: Bed Alarm, Fall, ataxic movement and impulsive  Chart, physical therapy assessment, plan of care and goals were reviewed. ASSESSMENT  Patient continues with skilled PT services and is slowly progressing towards goals. Pt presented sitting up in recliner chair; pleasant and cooperative. After guiding pt through some seated B LE therex he was able to stand up w/ 2WW for support and then walked 2 continuously laps around the nursing station w/ CGA; recliner chair follow to ensure safety. No LOB and no SOB noted while walking but pt continues to need frequent and repeated cues to \"slow down\". His overall stability, coordination and balance are vastly improved when he slows down. He is still a high fall risk w/ some impulsivity and noted ataxia in the presence of questionable safety awareness. At the end of the session pt was assisted back into his recliner chair and he was left resting w/ B LEs elevated and both tray table and call bell in reach; chair alarm on; 1:1 sitter in the hallway. Vitals noted during session included: O2 sats on RA 96%, HR 66 and /60. Current Level of Function Impacting Discharge (mobility/balance): transfers CGA to min A; balance w/ AD poor (+) to fair (-)    Other factors to consider for discharge: supportive dtr         PLAN :  Patient continues to benefit from skilled intervention to address the above impairments. Continue treatment per established plan of care. to address goals.     Recommendation for discharge: (in order for the patient to meet his/her long term goals)  Therapy up to 5 days/week in SNF setting    This discharge recommendation:  Has been made in collaboration with the attending provider and/or case management    IF patient discharges home will need the following DME: to be determined (TBD)       SUBJECTIVE:   Patient stated I'm just watching all the activity out there in the hallway. .. interesting day.     OBJECTIVE DATA SUMMARY:   Critical Behavior:  Neurologic State: Alert  Orientation Level: Oriented to person, Oriented to time, Oriented to situation, Disoriented to place  Cognition: Follows commands, Impaired decision making, Impulsive  Safety/Judgement: Decreased insight into deficits, Fall prevention  Functional Mobility Training:  Bed Mobility:      NT              Transfers:  Sit to Stand: Contact guard assistance  Stand to Sit: Contact guard assistance                             Balance:  Sitting: Intact  Standing: Impaired; With support  Standing - Static: Constant support;Fair;Occasional  Standing - Dynamic : Constant support;Occasional;Fair  Ambulation/Gait Training:  Distance (ft): 250 Feet (ft)  Assistive Device: Gait belt;Walker, rolling           Gait Abnormalities: Ataxic;Decreased step clearance; Path deviations        Base of Support: Widened     Speed/Padma: Fluctuations  Step Length: Left shortened;Right shortened                  Therapeutic Exercises:   B LE seated therex completed including: ankle rocking, LAQs and marching; each completed for 1-2 sets and reps up to 12 as tolerated  Pain Rating:  Denied overall    Activity Tolerance:   Fair, SpO2 stable on RA, and requires rest breaks    After treatment patient left in no apparent distress:   Sitting in chair, Heels elevated for pressure relief, Call bell within reach, Bed / chair alarm activated, and 1:1 sitter present in the hallway    COMMUNICATION/COLLABORATION:   The patients plan of care was discussed with: Occupational therapist, Registered nurse, Case management, and Certified nursing assistant/patient care technician.      Cale Lindsey, PT   Time Calculation: 24 mins

## 2023-04-18 NOTE — PROGRESS NOTES
[facility]  Hospitalist Progress Note    NAME: Brittanie Larry   :  1940   MRN:  [id number]     Total duration of encounter: 7 days      Admission HPI/Hospitalization Summary To Date:    As well described previously with no change in history  82year-old male admitted with falls at home on 2023 with a ground-level fall, along with 3-weeks per patient of confusion which he clearly recognizes as being intermittent and unusual.  He reports \"hallucinations\" but has not been more specific as to what type. 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 After admission, he is seen by therapies, noted to be impulsive and unsteady. Head CT  shows no acute issue, mild diffuse cortical volume loss with chronic microvascular ischemic change. MRI  shows minimal chronic microvascular ischemic disease, small chronic infarcts left thalamus and right cerebellum-nothing acute carotid duplex  showed mild right and left ICA stenosis. Because of issues with delirium, he underwent psychiatric consult on  felt to have mixed delirium which may be starting to clear. According to PCP outpatient note on 3/14/2023, Wellbutrin had been recently increased to 300, and Neurontin was restarted for diabetic neuropathy twice daily. Here his Wellbutrin was decreased back to 150, and Neurontin stopped altogether. After arrival here, his course has been cyclic and fluctuating. Regarding behavioral disturbance, associated with confusion that has perhaps overall improved but still continues as a problem. His physical ability has also fluctuated. He had done reasonably well, but then his cyclic manner worsened over the last day or so. Therapies have suggested skilled nursing.   Daughter has been involved and has indicated inability to care for him at home-she works during the day and he continues to have issues. Subjective:     Reason for provider encounter today:    Follow-up hospitalized patient for assessment and management of multiple problems as listed  Chief Complaint :    He has no complaints or concerns today    Review of Systems:       General:                     No fever, chills, sweats, generalized weakness, anorexia     Eyes:                 No photophobia, eye pain, or visual changes     ENT:      No rhinorrhea, pharyngitis      Respiratory:    No cough, sputum production, SOB, BONILLA, wheezing, chest pain      Cardiology:               No chest pain, palpitations, orthopnea, PND, edema, syncope      Gastrointestinal:     No abdominal pain , N/V, diarrhea, dysphagia, constipation, bleeding      Genitourinary:     No frequency, urgency, dysuria, hematuria, incontinence, prostatism      Muskuloskeletal:    No arthralgia, myalgia, back pain     Hematology:     No easy bruising, nose or gum bleeding, lymphadenopathy      Dermatological:    No rash, ulceration, pruritis, color change / jaundice     Neurological:     No headache, dizziness, confusion, focal weakness    paresthesia, speech difficulties, memory loss, gait difficulty     Psychological:    No feelings of anxiety, depression, agitation      PMH/SH:  Reviewed when deemed appropriate. No change from that contained in H and P and past records except as otherwise stated      Objective:     VITALS:        Patient Vitals for the past 12 hrs:   Temp Pulse Resp BP SpO2   04/18/23 0720 98.2 °F (36.8 °C) 70 18 138/64 96 %          Intake/Output Summary (Last 24 hours) at 4/18/2023 1308  Last data filed at 4/18/2023 1200  Gross per 24 hour   Intake 840 ml   Output --   Net 840 ml          PHYSICAL EXAM:       General: WDWN  Alert,  Cooperative   NAD he looks tired did not sleep as well last night     EENT:         EOMI   Anicteric Sclerae   No Pallor  MMM     Resp: No resp distress  CTA bilaterally,  No wheezing or rales.   Air movement good No accessory muscle use     CV:  RRR,  S1-S2 normal without M/R/G     GI:  Soft,  Non distended   Non tender   +Bowel sounds   Generally benign     Neurologic:  Alert and oriented X      Normal speech   Grossly intact   Moving all 4 extremities. Vascular:      Musculoskeletal/extremities: No evidence  for DVT   No cyanosis arthritis or joint      abnormality,       Psych:   He is seems to have insight has good focus now is not behavioral     Skin:  No rashes. No jaundice      LABS:    I reviewed the following today and incorporated them into my assessment/plan/medical decision making:     I reviewed pertinent lab test results and cultures  YES     I reviewed pertinent radiology/diagnostic data test results   YES            Pertinent lab results include:              Recent Labs     04/18/23  0857   WBC 10.6   HGB 12.2   HCT 37.9                    Recent Labs     04/18/23  0857      K 4.7      CO2 29   *   BUN 31*   CREA 1.26   CA 8.6   MG 2.1                              Assessment / Plan/Medical Decision Making:    *Toxic drug monitoring     yes (see below)      no  *Discussed case with-see below      Principal Problem:    Ground-level fall (4/11/2023)  Active Problems:    Altered mental state (4/11/2023)    Delirium    Dysequilibrium (4/11/2023)  Peripheral neuropathy  -MRI noted for old thalamic and R cerebellar infarcts (not present on prior CT 4/22)  -Carotid Duplex with mild disease  -Continues to have no evidence for acute focal neurologic deficits  -  Telemetry w/o arrhythmia / Afib now discontinued  -His behavior has been fluctuating and cyclic perhaps overall better  -Physically also cyclic.  -Daughters expressed desire but inability to take him home given current situation  -Any number of possible etiologies exist for his presentation though suspect high in the differential is iatrogenic related to recent outpatient increase in Wellbutrin and reinitiation of Neurontin. -TSH B12 normal  -There is no clear reason at this point why he continues to have this intermittent delirium and behavior perhaps his change in venue. Sometimes after a neurologic cognitive insult, patients with cognitive dysfunction will take weeks to improve. Consider drug related  -Decrease Zoloft yesterday concerned as described that this may be medication related  -Labs today show normal CBC, assuring basic panel. Regarding his falls,  -Lumbar spine CT shows no evidence of acute fracture or aggressive osseous lesion and multiple levels of degenerative changes please see report. These are not surprising given his age. There is a partially imaged aortic aneurysm suboptimally evaluated and a right adrenal adenoma. There is no obvious focal neurologic deficits, infection or other specific etiology  Plan:  -Discussed with psychiatry today we will increase Seroquel nightly  -On my own accord will decrease his Wellbutrin to . Just in the note  Okay  -Peer to peer review by myself today led to a reversal of denial as it is clear that by therapy notes he is unsafe to go home.  -Plan to admit to postacute skilled services soon    --Dr. Sharonda Byrnes suggested follow up with Frankie Maldonado NP -  Kho[facility] Lopez to further evaluate medication modification.  -Neurology consult recommended by Dr. Thanh Engel as outpatient        Hypercholesterolemia (2/25/2013)  Fasting panel good  Using Mevacor 40mg daily - dose with Lipitor 20mg during admission         Hypertension  -Well-controlled  Cont Metoprolol XL 12.5mg daily, Lisinopril 10mg daily.   HCTZ DC'd earlier will not restart  -Was not orthostatic per nursing by direct communication to me  Basic panel today looks good  Plans:      DC HCTZ indefinitely       Type 2 diabetes mellitus treated without insulin ([facility]) ()  -These have been well controlled while here  -Hemoglobin A1c 5.8.  -This suggests that control at home is excellent,  -Metformin was discontinued, and agree that on discharge this should continue to be discontinued. Plan: Outpatient follow-up for diabetes           Anxiety and depression (6/17/2013)  - I am concern that his Wellbutrin which was recently increased to 300 mg as a etiology of his falls. Also Neurontin had been started see above  Patient was seen by psychiatry  -Wellbutrin was decreased and Neurontin discontinued see above discussion  -Current medications 50 mg q. dinner, Zoloft 50 nightly Wellbutrin 150 every morning, as needed Seroquel every 6 hours and trazodone 50 mg nightly as needed  Plan: Increase Seroquel to 75 at dinner            Decrease Wellbutrin      Obstructive Sleep Apnea  Per testing 2021 with low Oxim 87%  Needs Neuro follow up, may need home nocturnal Oxygen        Decreased GFR  -Unknown if this is acute or chronic. This is early                   ___________________________________________________________________    SAFETY:   Code Status:Full  DVT prophylaxis:Lovenox  Bladder catheter:no  Family Contact Info:  Primary Emergency Contact: Collette Carmel, Home Phone: [phone]  Disposition: TBD, likely home when stable  Admission status:  Inpatient          Care Plan discussed with:                                   Comments  Patient x     Family      RN  Other  Members  Care Team   (specify)  x       Care Manager x      Consultant                         x  Multidiciplinary team rounds were held today with , nursing, pharmacist and clinical coordinator. Patient's plan of care was discussed; medications were reviewed and discharge planning was addressed.         ____________________________________________            Signed: Surinder Tucker MD  [facility] Hospitalist  [phone]

## 2023-04-18 NOTE — DISCHARGE SUMMARY
[facility]  Hospitalist Discharge Summary    Patient ID:  Catherine Armenta  [id number]  82 y.o.  1940    PCP on record: Chaparro Rollins MD    Admit date: 4/11/2023  Discharge date and time: 4/18/2023     DISCHARGE DIAGNOSES:       *PRIMARY DIAGNOSIS    Encounter for rehab       *ADDITIONAL DIAGNOSES  Self-care deficit  Generalized weakness and multiple falls possible medication effect  Encephalopathy/delirium with behavioral disturbance  Peripheral neuropathy  Dyslipidemia  Essential hypertension  Diabetes 2  Anxiety depression  YUSRA  Decreased GFR          CONSULTATIONS:  IP CONSULT TO PSYCHIATRY      ______________________________________________________________________    HPI    82-year-old male admitted with falls at home on 4/11/2023 with a ground-level fall, along with 3-weeks per patient of confusion which he clearly recognizes as being intermittent and unusual.  He reports \"hallucinations\" but has not been more specific as to what type. 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 HOSPITAL COURSE    OVERVIEW/SUMMARY    After admission, he is seen by therapies, noted to be impulsive and unsteady. Head CT 4/11 shows no acute issue, mild diffuse cortical volume loss with chronic microvascular ischemic change. MRI 4/11 shows minimal chronic microvascular ischemic disease, small chronic infarcts left thalamus and right cerebellum-nothing acute carotid duplex 4/11 showed mild right and left ICA stenosis. Because of issues with delirium, he underwent psychiatric consult on 4/13 felt to have mixed delirium which may be starting to clear. According to PCP outpatient note on 3/14/2023, Wellbutrin had been recently increased to 300, and Neurontin was restarted for diabetic neuropathy twice daily.   Here his Wellbutrin was decreased back to 150, and Neurontin stopped altogether. Unfortunately his course has been cyclic and fluctuating. We have been in the midst of trying to tease out why, may be related to change in venue or persistent affect of medication in the setting of cognitive dysfunction. We are looking at continuing to adjust his medication see below regarding behavioral disturbance, associated with confusion that has perhaps overall improved but still continues as a problem. His physical ability has also fluctuated. He had done reasonably well, but then his cyclic manner worsened over the last day or so. Therapies have suggested skilled nursing. Daughter has been involved and has indicated inability to care for him at home-she works during the day and he continues to have issues    I did discuss his care and a peer to peer review today with insurance company reviewing physician. They graciously agreed to reverse denial given his obvious continued need for skilled services. DETAILS/ASSESSMENT/PLANS    Principal Problem:    Ground-level fall (4/11/2023)  Active Problems:    Altered mental state (4/11/2023)    Delirium    Dysequilibrium (4/11/2023)  Peripheral neuropathy  -MRI noted for old thalamic and R cerebellar infarcts (not present on prior CT 4/22)  -Carotid Duplex with mild disease  -Continues to have no evidence for acute focal neurologic deficits  -  Telemetry w/o arrhythmia / Afib now discontinued  -His behavior has been fluctuating and cyclic perhaps overall better  -Physically also cyclic.  -Daughters expressed desire but inability to take him home given current situation  -Any number of possible etiologies exist for his presentation though suspect high in the differential is iatrogenic related to recent outpatient increase in Wellbutrin and reinitiation of Neurontin.     -TSH B12 normal  -There is no clear reason at this point why he continues to have this intermittent delirium and behavior perhaps his change in venue, side effect of medications in the substrate of chronic cognitive deficit. Sometimes after a neurologic cognitive insult, patients with cognitive dysfunction will take weeks to improve. The weight that  -Decreased Zoloft yesterday concerned as described that this may be medication related  -Labs today show normal CBC, assuring basic panel.  -We increased Seroquel today and decrease Wellbutrin to  from 150. Regarding his falls,  -Lumbar spine CT shows no evidence of acute fracture or aggressive osseous lesion and multiple levels of degenerative changes please see report. These are not surprising given his age. There is a partially imaged aortic aneurysm suboptimally evaluated and a right adrenal adenoma. There is no obvious focal neurologic deficits, infection or other specific etiology  -Peer to peer review by myself today led to a reversal of denial as it is clear that by therapy notes he is unsafe to go home.  --Dr. James Zhu suggested follow up with Romero Sanchez NP -  Francisco Jin to further evaluate medication modification.  -Neurology consult recommended by Dr. Maria Del Carmen Vasquez as outpatient    Patient will be transferred to TCU bed today. Plan for PT OT continued medication adjustment as felt indicated. Hypercholesterolemia (2/25/2013)  Fasting panel good  Using Mevacor 40mg daily - dose with Lipitor 20mg during admission          Hypertension  -Well-controlled  Cont Metoprolol XL 12.5mg daily, Lisinopril 10mg daily. HCTZ DC'd earlier will not restart  -Was not orthostatic per nursing by direct communication to me  Basic panel today looks good  Plans:      DC HCTZ indefinitely       Type 2 diabetes mellitus treated without insulin (HCC) ()  -These have been well controlled while here  -Hemoglobin A1c 5.8.  -This suggests that control at home is excellent,  -Metformin was discontinued, and agree that on discharge this should continue to be discontinued.   Plan: Outpatient follow-up for diabetes           Anxiety and depression (6/17/2013)  - I am concern that his Wellbutrin which was recently increased to 300 mg as a etiology of his falls. Also Neurontin had been started see above  Patient was seen by psychiatry  -Wellbutrin was decreased and Neurontin discontinued after initial admission.  -I subsequently decreased his Zoloft to 50 and this morning decreased Wellbutrin to 100 daily.  -Current medications Seroquel 50 mg q. dinner, Zoloft 50 nightly Wellbutrin 1 100 every morning, as needed Seroquel every 6 hours and trazodone 50 mg nightly as needed  Plan: Continue current management. Obstructive Sleep Apnea  Per testing 2021 with low Oxim 87%  Needs Neuro follow up, may need home nocturnal Oxygen        Decreased GFR  -Unknown if this is acute or chronic. This is early        ________________________________________________________________      Subjective/Chief Complaint/ Reason For Visit:  Patient had been seen earlier today and is now being seen for discharge orchestration. Physical Exam:  Visit Vitals  /64 (BP 1 Location: Left upper arm, BP Patient Position: At rest)   Pulse 70   Temp 98.2 °F (36.8 °C)   Resp 18   Ht 6' (1.829 m)   SpO2 96%   BMI 34.18 kg/m²     Gen:    Not in distress nontoxic. Vital signs are acceptable  HEENT: Stable and unremarkable  Chest: Nonlabored respiration, Clear lungs  CVS:   PMI within normal limits, regular rate and rhythm S1-S2 without murmurs rubs gallops  Abd:  Soft, not distended, not tender  EXT: Stable without edema, cyanosis, trauma or significant findings  Neuro:  Alert, nonfocal, weak  Psychiatric: Not suicidal.  No delusions or behavioral disturbance  _______________________________________________________________________  POST DISCHARGE RECOMMENDATIONS         *Suggestions To  Follow Up Providers (include FU labs):  -Continue to adjust psych medications as needed.   Consider coming off the Wellbutrin altogether  -Assess how we have done with the decrease in Wellbutrin to 100 today as well as the increase in Seroquel to 75 q. supper from 50  -Consider psych reconsult              *Follow up with:           *PCP : Kesha Escobar MD         *Diet: Diabetic          *Activity:  Per physical therapy          *Wound Care: none                             *DISCHARGE MEDICATIONS:     Current Discharge Medication List            ______________________________________________________________________  DISPOSITION:    Home with Family:    Home with HH/PT/OT/RN:    SNF/LTC:    VIOLETA:    OTHER: X       Condition at Discharge:  Stable  _____________________________________________________________________    Total time in minutes spent coordinating this discharge (includes going over instructions, follow-up, prescriptions, and preparing report for sign off to her PCP) :   Greater than 35 minutes    Signed:  Amye Harada, MD  [facility] Hospitalist  [phone]

## 2023-04-19 LAB
GLUCOSE BLD STRIP.AUTO-MCNC: 89 MG/DL (ref 65–117)
GLUCOSE BLD STRIP.AUTO-MCNC: 97 MG/DL (ref 65–117)
SERVICE CMNT-IMP: NORMAL
SERVICE CMNT-IMP: NORMAL

## 2023-04-19 PROCEDURE — 97165 OT EVAL LOW COMPLEX 30 MIN: CPT

## 2023-04-19 PROCEDURE — 65270000011 HC RM PRIVATE SNF

## 2023-04-19 PROCEDURE — 97161 PT EVAL LOW COMPLEX 20 MIN: CPT

## 2023-04-19 PROCEDURE — 74011250637 HC RX REV CODE- 250/637: Performed by: INTERNAL MEDICINE

## 2023-04-19 PROCEDURE — 97535 SELF CARE MNGMENT TRAINING: CPT

## 2023-04-19 PROCEDURE — 94760 N-INVAS EAR/PLS OXIMETRY 1: CPT

## 2023-04-19 PROCEDURE — 97110 THERAPEUTIC EXERCISES: CPT

## 2023-04-19 PROCEDURE — 74011250636 HC RX REV CODE- 250/636: Performed by: INTERNAL MEDICINE

## 2023-04-19 RX ADMIN — SERTRALINE 50 MG: 50 TABLET, FILM COATED ORAL at 21:03

## 2023-04-19 RX ADMIN — ATORVASTATIN CALCIUM 20 MG: 20 TABLET, FILM COATED ORAL at 21:03

## 2023-04-19 RX ADMIN — ENOXAPARIN SODIUM 30 MG: 100 INJECTION SUBCUTANEOUS at 21:03

## 2023-04-19 RX ADMIN — ASPIRIN 81 MG 81 MG: 81 TABLET ORAL at 11:09

## 2023-04-19 RX ADMIN — LISINOPRIL 5 MG: 5 TABLET ORAL at 11:09

## 2023-04-19 RX ADMIN — TRAZODONE HYDROCHLORIDE 50 MG: 50 TABLET ORAL at 21:03

## 2023-04-19 RX ADMIN — METOPROLOL SUCCINATE 25 MG: 25 TABLET, EXTENDED RELEASE ORAL at 11:09

## 2023-04-19 RX ADMIN — BUPROPION HYDROCHLORIDE 100 MG: 100 TABLET, EXTENDED RELEASE ORAL at 11:09

## 2023-04-19 RX ADMIN — QUETIAPINE FUMARATE 75 MG: 25 TABLET ORAL at 17:00

## 2023-04-19 RX ADMIN — ENOXAPARIN SODIUM 30 MG: 100 INJECTION SUBCUTANEOUS at 11:09

## 2023-04-20 PROBLEM — F01.50 VASCULAR DEMENTIA WITH DELIRIUM (HCC): Status: ACTIVE | Noted: 2023-04-13

## 2023-04-20 PROBLEM — F05 VASCULAR DEMENTIA WITH DELIRIUM (HCC): Status: ACTIVE | Noted: 2023-04-13

## 2023-04-20 PROCEDURE — 74011250637 HC RX REV CODE- 250/637: Performed by: PSYCHIATRY & NEUROLOGY

## 2023-04-20 PROCEDURE — 97110 THERAPEUTIC EXERCISES: CPT

## 2023-04-20 PROCEDURE — 74011250637 HC RX REV CODE- 250/637: Performed by: INTERNAL MEDICINE

## 2023-04-20 PROCEDURE — 97112 NEUROMUSCULAR REEDUCATION: CPT

## 2023-04-20 PROCEDURE — 97535 SELF CARE MNGMENT TRAINING: CPT

## 2023-04-20 PROCEDURE — 65270000011 HC RM PRIVATE SNF

## 2023-04-20 PROCEDURE — 74011250636 HC RX REV CODE- 250/636: Performed by: INTERNAL MEDICINE

## 2023-04-20 PROCEDURE — 99231 SBSQ HOSP IP/OBS SF/LOW 25: CPT | Performed by: PSYCHIATRY & NEUROLOGY

## 2023-04-20 RX ORDER — QUETIAPINE FUMARATE 100 MG/1
100 TABLET, FILM COATED ORAL
Status: DISCONTINUED | OUTPATIENT
Start: 2023-04-20 | End: 2023-04-28 | Stop reason: HOSPADM

## 2023-04-20 RX ADMIN — ENOXAPARIN SODIUM 30 MG: 100 INJECTION SUBCUTANEOUS at 21:23

## 2023-04-20 RX ADMIN — TRAZODONE HYDROCHLORIDE 50 MG: 50 TABLET ORAL at 21:23

## 2023-04-20 RX ADMIN — ENOXAPARIN SODIUM 30 MG: 100 INJECTION SUBCUTANEOUS at 09:43

## 2023-04-20 RX ADMIN — QUETIAPINE FUMARATE 100 MG: 100 TABLET ORAL at 18:06

## 2023-04-20 RX ADMIN — SERTRALINE 50 MG: 50 TABLET, FILM COATED ORAL at 21:23

## 2023-04-20 RX ADMIN — METOPROLOL SUCCINATE 25 MG: 25 TABLET, EXTENDED RELEASE ORAL at 09:43

## 2023-04-20 RX ADMIN — LISINOPRIL 5 MG: 5 TABLET ORAL at 09:43

## 2023-04-20 RX ADMIN — ATORVASTATIN CALCIUM 20 MG: 20 TABLET, FILM COATED ORAL at 21:23

## 2023-04-20 RX ADMIN — ASPIRIN 81 MG 81 MG: 81 TABLET ORAL at 09:43

## 2023-04-21 LAB
ATRIAL RATE: 72 BPM
CALCULATED P AXIS, ECG09: 18 DEGREES
CALCULATED R AXIS, ECG10: -26 DEGREES
CALCULATED T AXIS, ECG11: 22 DEGREES
DIAGNOSIS, 93000: NORMAL
P-R INTERVAL, ECG05: 166 MS
Q-T INTERVAL, ECG07: 386 MS
QRS DURATION, ECG06: 112 MS
QTC CALCULATION (BEZET), ECG08: 422 MS
VENTRICULAR RATE, ECG03: 72 BPM

## 2023-04-21 PROCEDURE — 97112 NEUROMUSCULAR REEDUCATION: CPT

## 2023-04-21 PROCEDURE — 94760 N-INVAS EAR/PLS OXIMETRY 1: CPT

## 2023-04-21 PROCEDURE — 74011250637 HC RX REV CODE- 250/637: Performed by: INTERNAL MEDICINE

## 2023-04-21 PROCEDURE — 97110 THERAPEUTIC EXERCISES: CPT

## 2023-04-21 PROCEDURE — 74011250636 HC RX REV CODE- 250/636: Performed by: INTERNAL MEDICINE

## 2023-04-21 PROCEDURE — 65270000011 HC RM PRIVATE SNF

## 2023-04-21 PROCEDURE — 74011250637 HC RX REV CODE- 250/637: Performed by: PSYCHIATRY & NEUROLOGY

## 2023-04-21 RX ADMIN — ENOXAPARIN SODIUM 30 MG: 100 INJECTION SUBCUTANEOUS at 22:01

## 2023-04-21 RX ADMIN — METOPROLOL SUCCINATE 25 MG: 25 TABLET, EXTENDED RELEASE ORAL at 10:27

## 2023-04-21 RX ADMIN — ENOXAPARIN SODIUM 30 MG: 100 INJECTION SUBCUTANEOUS at 10:28

## 2023-04-21 RX ADMIN — QUETIAPINE FUMARATE 100 MG: 100 TABLET ORAL at 18:21

## 2023-04-21 RX ADMIN — LISINOPRIL 5 MG: 5 TABLET ORAL at 10:27

## 2023-04-21 RX ADMIN — TRAZODONE HYDROCHLORIDE 50 MG: 50 TABLET ORAL at 22:01

## 2023-04-21 RX ADMIN — SERTRALINE 50 MG: 50 TABLET, FILM COATED ORAL at 22:01

## 2023-04-21 RX ADMIN — ATORVASTATIN CALCIUM 20 MG: 20 TABLET, FILM COATED ORAL at 22:01

## 2023-04-21 RX ADMIN — ASPIRIN 81 MG 81 MG: 81 TABLET ORAL at 10:27

## 2023-04-22 PROCEDURE — 65270000011 HC RM PRIVATE SNF

## 2023-04-22 PROCEDURE — 74011250637 HC RX REV CODE- 250/637: Performed by: PSYCHIATRY & NEUROLOGY

## 2023-04-22 PROCEDURE — 97535 SELF CARE MNGMENT TRAINING: CPT

## 2023-04-22 PROCEDURE — 97110 THERAPEUTIC EXERCISES: CPT

## 2023-04-22 PROCEDURE — 94760 N-INVAS EAR/PLS OXIMETRY 1: CPT

## 2023-04-22 PROCEDURE — 74011250637 HC RX REV CODE- 250/637: Performed by: INTERNAL MEDICINE

## 2023-04-22 PROCEDURE — 74011250636 HC RX REV CODE- 250/636: Performed by: INTERNAL MEDICINE

## 2023-04-22 RX ADMIN — ASPIRIN 81 MG 81 MG: 81 TABLET ORAL at 09:41

## 2023-04-22 RX ADMIN — ATORVASTATIN CALCIUM 20 MG: 20 TABLET, FILM COATED ORAL at 21:36

## 2023-04-22 RX ADMIN — ENOXAPARIN SODIUM 30 MG: 100 INJECTION SUBCUTANEOUS at 21:36

## 2023-04-22 RX ADMIN — LISINOPRIL 5 MG: 5 TABLET ORAL at 09:41

## 2023-04-22 RX ADMIN — ENOXAPARIN SODIUM 30 MG: 100 INJECTION SUBCUTANEOUS at 09:41

## 2023-04-22 RX ADMIN — TRAZODONE HYDROCHLORIDE 50 MG: 50 TABLET ORAL at 21:36

## 2023-04-22 RX ADMIN — METOPROLOL SUCCINATE 25 MG: 25 TABLET, EXTENDED RELEASE ORAL at 09:41

## 2023-04-22 RX ADMIN — SERTRALINE 50 MG: 50 TABLET, FILM COATED ORAL at 21:36

## 2023-04-22 RX ADMIN — QUETIAPINE FUMARATE 100 MG: 100 TABLET ORAL at 15:45

## 2023-04-23 PROBLEM — Z51.89 ENCOUNTER FOR REHABILITATION: Status: ACTIVE | Noted: 2023-04-23

## 2023-04-23 LAB
ANION GAP SERPL CALC-SCNC: 9 MMOL/L (ref 5–15)
BASOPHILS # BLD: 0 K/UL (ref 0–0.1)
BASOPHILS NFR BLD: 0 % (ref 0–1)
BUN SERPL-MCNC: 30 MG/DL (ref 6–20)
BUN/CREAT SERPL: 23 (ref 12–20)
CALCIUM SERPL-MCNC: 8.6 MG/DL (ref 8.5–10.1)
CHLORIDE SERPL-SCNC: 105 MMOL/L (ref 97–108)
CO2 SERPL-SCNC: 27 MMOL/L (ref 21–32)
CREAT SERPL-MCNC: 1.28 MG/DL (ref 0.7–1.3)
DIFFERENTIAL METHOD BLD: ABNORMAL
EOSINOPHIL # BLD: 0.2 K/UL (ref 0–0.4)
EOSINOPHIL NFR BLD: 2 % (ref 0–7)
ERYTHROCYTE [DISTWIDTH] IN BLOOD BY AUTOMATED COUNT: 13.9 % (ref 11.5–14.5)
GLUCOSE SERPL-MCNC: 112 MG/DL (ref 65–100)
HCT VFR BLD AUTO: 34.8 % (ref 36.6–50.3)
HGB BLD-MCNC: 11.5 G/DL (ref 12.1–17)
IMM GRANULOCYTES # BLD AUTO: 0 K/UL (ref 0–0.04)
IMM GRANULOCYTES NFR BLD AUTO: 0 % (ref 0–0.5)
LYMPHOCYTES # BLD: 2.3 K/UL (ref 0.8–3.5)
LYMPHOCYTES NFR BLD: 26 % (ref 12–49)
MCH RBC QN AUTO: 32.6 PG (ref 26–34)
MCHC RBC AUTO-ENTMCNC: 33 G/DL (ref 30–36.5)
MCV RBC AUTO: 98.6 FL (ref 80–99)
MONOCYTES # BLD: 0.7 K/UL (ref 0–1)
MONOCYTES NFR BLD: 8 % (ref 5–13)
NEUTS SEG # BLD: 5.7 K/UL (ref 1.8–8)
NEUTS SEG NFR BLD: 64 % (ref 32–75)
NRBC # BLD: 0 K/UL (ref 0–0.01)
NRBC BLD-RTO: 0 PER 100 WBC
PLATELET # BLD AUTO: 230 K/UL (ref 150–400)
PMV BLD AUTO: 9.6 FL (ref 8.9–12.9)
POTASSIUM SERPL-SCNC: 4.3 MMOL/L (ref 3.5–5.1)
RBC # BLD AUTO: 3.53 M/UL (ref 4.1–5.7)
SODIUM SERPL-SCNC: 141 MMOL/L (ref 136–145)
WBC # BLD AUTO: 9 K/UL (ref 4.1–11.1)

## 2023-04-23 PROCEDURE — 65270000011 HC RM PRIVATE SNF

## 2023-04-23 PROCEDURE — 85025 COMPLETE CBC W/AUTO DIFF WBC: CPT

## 2023-04-23 PROCEDURE — 80048 BASIC METABOLIC PNL TOTAL CA: CPT

## 2023-04-23 PROCEDURE — 74011250637 HC RX REV CODE- 250/637: Performed by: PSYCHIATRY & NEUROLOGY

## 2023-04-23 PROCEDURE — 94760 N-INVAS EAR/PLS OXIMETRY 1: CPT

## 2023-04-23 PROCEDURE — 74011250637 HC RX REV CODE- 250/637: Performed by: INTERNAL MEDICINE

## 2023-04-23 PROCEDURE — 36415 COLL VENOUS BLD VENIPUNCTURE: CPT

## 2023-04-23 PROCEDURE — 74011250636 HC RX REV CODE- 250/636: Performed by: INTERNAL MEDICINE

## 2023-04-23 RX ADMIN — ASPIRIN 81 MG 81 MG: 81 TABLET ORAL at 08:20

## 2023-04-23 RX ADMIN — ENOXAPARIN SODIUM 30 MG: 100 INJECTION SUBCUTANEOUS at 08:20

## 2023-04-23 RX ADMIN — SERTRALINE 50 MG: 50 TABLET, FILM COATED ORAL at 21:42

## 2023-04-23 RX ADMIN — LISINOPRIL 5 MG: 5 TABLET ORAL at 08:20

## 2023-04-23 RX ADMIN — METOPROLOL SUCCINATE 25 MG: 25 TABLET, EXTENDED RELEASE ORAL at 08:20

## 2023-04-23 RX ADMIN — QUETIAPINE FUMARATE 100 MG: 100 TABLET ORAL at 17:33

## 2023-04-23 RX ADMIN — ENOXAPARIN SODIUM 30 MG: 100 INJECTION SUBCUTANEOUS at 21:42

## 2023-04-23 RX ADMIN — ATORVASTATIN CALCIUM 20 MG: 20 TABLET, FILM COATED ORAL at 21:42

## 2023-04-24 PROCEDURE — 74011250636 HC RX REV CODE- 250/636: Performed by: INTERNAL MEDICINE

## 2023-04-24 PROCEDURE — 74011250637 HC RX REV CODE- 250/637: Performed by: INTERNAL MEDICINE

## 2023-04-24 PROCEDURE — 65270000011 HC RM PRIVATE SNF

## 2023-04-24 PROCEDURE — 97110 THERAPEUTIC EXERCISES: CPT

## 2023-04-24 PROCEDURE — 94760 N-INVAS EAR/PLS OXIMETRY 1: CPT

## 2023-04-24 PROCEDURE — 74011250637 HC RX REV CODE- 250/637: Performed by: PSYCHIATRY & NEUROLOGY

## 2023-04-24 RX ADMIN — ENOXAPARIN SODIUM 30 MG: 100 INJECTION SUBCUTANEOUS at 09:37

## 2023-04-24 RX ADMIN — SERTRALINE 50 MG: 50 TABLET, FILM COATED ORAL at 21:14

## 2023-04-24 RX ADMIN — ASPIRIN 81 MG 81 MG: 81 TABLET ORAL at 09:37

## 2023-04-24 RX ADMIN — QUETIAPINE FUMARATE 100 MG: 100 TABLET ORAL at 16:12

## 2023-04-24 RX ADMIN — ENOXAPARIN SODIUM 30 MG: 100 INJECTION SUBCUTANEOUS at 21:14

## 2023-04-24 RX ADMIN — LISINOPRIL 5 MG: 5 TABLET ORAL at 09:37

## 2023-04-24 RX ADMIN — ATORVASTATIN CALCIUM 20 MG: 20 TABLET, FILM COATED ORAL at 21:14

## 2023-04-24 RX ADMIN — METOPROLOL SUCCINATE 25 MG: 25 TABLET, EXTENDED RELEASE ORAL at 09:37

## 2023-04-24 NOTE — PROGRESS NOTES
IDR Team; MD, Nursing, Care Manager, PTA, Nursing Supervisor, , Pharmacy and Dietician, met to review patient's plan of care. Discussed goals, interventions, barriers and progress. Team will continue to monitor progress and report any concerns to the physician and care management as indicated. Transition of Care Plan: Per MD, patient is getting around okay. Family may want him to be discharged today per MD. CM will go ahead and send updates to insurance company. If patient's daughter comfortable with taking him okay that would be okay. 1108: Updated clinical info uploaded to [facility]. Waiting to hear back about approval/denial of further skilled care days. 1607: Checked [facility]. Updated stay request still pending.

## 2023-04-24 NOTE — PROGRESS NOTES
Follow up visit with patient in [address]  Provided empathic listening and spiritual support  Advised of  Availability    [address] [place] [address]

## 2023-04-24 NOTE — SWING BED INTERDISCIPLINARY CARE PLAN NURSE NOTE
Nursing:    Week #2: Date 4/24/2023  Medical status (changes from previous week):Progressing  Treatment changes this shift: none    Cognition: Present status: dementia          Is cueing needed?: Yes          Frequency of cueing needs: occasional           Type of cueing used: verbal  ADL (general):  Minimum assistance  Activity type: Social leisure skills  Participation: Daily  Level of participation: Improving  Pain status: No c/o pain  Patient/Family Education needs: safety        Nasrin Burrows RN

## 2023-04-24 NOTE — PROGRESS NOTES
Bedside shift change report given to JAGRUTI Burrows RN (oncoming nurse) by Guillaume Urbano RN (offgoing nurse). Report included the following information Kardex.

## 2023-04-25 PROCEDURE — 65270000011 HC RM PRIVATE SNF

## 2023-04-25 PROCEDURE — 97535 SELF CARE MNGMENT TRAINING: CPT | Performed by: OCCUPATIONAL THERAPIST

## 2023-04-25 PROCEDURE — 74011250636 HC RX REV CODE- 250/636: Performed by: INTERNAL MEDICINE

## 2023-04-25 PROCEDURE — 74011250637 HC RX REV CODE- 250/637: Performed by: PSYCHIATRY & NEUROLOGY

## 2023-04-25 PROCEDURE — 97110 THERAPEUTIC EXERCISES: CPT

## 2023-04-25 PROCEDURE — 94760 N-INVAS EAR/PLS OXIMETRY 1: CPT

## 2023-04-25 PROCEDURE — 74011250637 HC RX REV CODE- 250/637: Performed by: INTERNAL MEDICINE

## 2023-04-25 RX ADMIN — METOPROLOL SUCCINATE 25 MG: 25 TABLET, EXTENDED RELEASE ORAL at 09:14

## 2023-04-25 RX ADMIN — QUETIAPINE FUMARATE 100 MG: 100 TABLET ORAL at 18:01

## 2023-04-25 RX ADMIN — SERTRALINE 50 MG: 50 TABLET, FILM COATED ORAL at 21:28

## 2023-04-25 RX ADMIN — ASPIRIN 81 MG 81 MG: 81 TABLET ORAL at 09:14

## 2023-04-25 RX ADMIN — LISINOPRIL 5 MG: 5 TABLET ORAL at 09:14

## 2023-04-25 RX ADMIN — ENOXAPARIN SODIUM 30 MG: 100 INJECTION SUBCUTANEOUS at 21:28

## 2023-04-25 RX ADMIN — ATORVASTATIN CALCIUM 20 MG: 20 TABLET, FILM COATED ORAL at 21:28

## 2023-04-25 RX ADMIN — ENOXAPARIN SODIUM 30 MG: 100 INJECTION SUBCUTANEOUS at 09:14

## 2023-04-25 NOTE — PROGRESS NOTES
1120: Checked on patient's continued stay case. On Jaida it states \"Cancelled\" on his stay from 4/18-4/24 and on the continued stay request. Antonia Bailey at [phone] and spoke with [facility]. She said it looked like system believed we were at inpatient rehab. Told he that was incorrect. Told he we are a critical care hospital that is able to provide skilled care beds. She states they are currently fixing the system. Once fixed in the system they will review for continued stay. She said to continue to wait to hear back from them about continued stay approval/denial       1556: Received phone call from [facility] from [place]. Patient was DENIED further days. LCD is 4/27. Patient must be discharged by Friday 4/28. Spoke with daughter. She is aware. She is going to arrange for patient to be home with her and when she isn't there she is going to hire a caregiver. [phone]: [address] notification of discharge explained to the patient that [address] will end on 4/27/23 and that the date of financial liability will begin on 4/28/23  Provided a copy of the notification to the patient and copy placed in the patient's chart.

## 2023-04-25 NOTE — PROGRESS NOTES
Pt. Had an uneventful night. Rested quietly without complaint. Report given to Dom Condon RN using SBAR and Kardex. Opportunity to ask and answer questions given.

## 2023-04-25 NOTE — PROGRESS NOTES
Problem: Falls - Risk of  Goal: *Absence of Falls  Description: Document Joselyn Pretty Fall Risk and appropriate interventions in the flowsheet. Outcome: Progressing Towards Goal  Note: Fall Risk Interventions:                                Problem: Pressure Injury - Risk of  Goal: *Prevention of pressure injury  Description: Document Eddie Scale and appropriate interventions in the flowsheet. Outcome: Progressing Towards Goal  Note: Pressure Injury Interventions:  Sensory Interventions: Assess changes in LOC, Check visual cues for pain    Moisture Interventions: Absorbent underpads, Apply protective barrier, creams and emollients    Activity Interventions: Increase time out of bed    Mobility Interventions: Turn and reposition approx.  every two hours(pillow and wedges)    Nutrition Interventions: Document food/fluid/supplement intake    Friction and Shear Interventions: Apply protective barrier, creams and emollients

## 2023-04-25 NOTE — PROGRESS NOTES
Problem: Falls - Risk of  Goal: *Absence of Falls  Description: Document Geraldine Quiles Fall Risk and appropriate interventions in the flowsheet. Outcome: Progressing Towards Goal  Note: Fall Risk Interventions:                                Problem: Patient Education: Go to Patient Education Activity  Goal: Patient/Family Education  Outcome: Progressing Towards Goal     Problem: Pressure Injury - Risk of  Goal: *Prevention of pressure injury  Description: Document Eddie Scale and appropriate interventions in the flowsheet.   Outcome: Progressing Towards Goal  Note: Pressure Injury Interventions:  Sensory Interventions: Assess changes in LOC    Moisture Interventions: Check for incontinence Q2 hours and as needed    Activity Interventions: Increase time out of bed    Mobility Interventions: PT/OT evaluation    Nutrition Interventions: Document food/fluid/supplement intake    Friction and Shear Interventions: Lift sheet                Problem: Patient Education: Go to Patient Education Activity  Goal: Patient/Family Education  Outcome: Progressing Towards Goal     Problem: Patient Education: Go to Patient Education Activity  Goal: Patient/Family Education  Outcome: Progressing Towards Goal     Problem: Patient Education: Go to Patient Education Activity  Goal: Patient/Family Education  Outcome: Progressing Towards Goal

## 2023-04-25 NOTE — PROGRESS NOTES
Physical Therapy:    Upon first attempt this PM pt had just received his dinner. Will continue to follow after pt eats and plan to progress as appropriate.      ASYA Celis DPT

## 2023-04-25 NOTE — PROGRESS NOTES
Problem: Mobility Impaired (Adult and Pediatric)  Goal: *Acute Goals and Plan of Care (Insert Text)  Description: FUNCTIONAL STATUS PRIOR TO ADMISSION: pt lives alone in a 1 level apt; 4 SHREE w/ B HR; he had been ambulatory w/ a quad cane until about a month ago but due to coordination issues and falling pt had started using a rollator; he has a tub shower combo at home w/ a shower seat and suction grab bars; again due to the coordination issues and fear of falling pt has not been showering much lately and has been opting for a sink \"bird bath\"; pt was intermittently driving up until a few weeks ago but has expressed to his dtr that he thinks he needs to stop driving completely; both pt and dtr express that pt has been MI for B IADLs up to this point including cooking, cleaning and shopping; it was also noted that pt occasionally sleeps in his recliner vs the bed due to back pain    HOME SUPPORT PRIOR TO ADMISSION: dtr lives local and assists PRN but until recently pt has not required much help    Physical Therapy Goals  Initiated 4/19/2023  1. Patient will move from supine to sit and sit to supine in bed with independence within 7 day(s). 2.  Patient will transfer from bed to chair and chair to bed with SBA using the least restrictive device within 7 day(s). 3.  Patient will perform sit to stand with SBA within 7 day(s). 4.  Patient will ambulate with SBA for 300 feet with the least restrictive device within 7 day(s). 5.  Patient will ascend/descend 4 stairs with 1-2 handrail(s) with SBA within 7 day(s).       Outcome: Progressing Towards Goal     Problem: Patient Education: Go to Patient Education Activity  Goal: Patient/Family Education  Outcome: Progressing Towards Goal  PHYSICAL THERAPY TREATMENT  Patient: Salty Roy (7 y.o. male)  Date: 4/24/2023  Diagnosis: Physical deconditioning [R53.81]  Generalized weakness [R53.1] Encounter for rehabilitation      Precautions:    Chart, physical therapy assessment, plan of care and goals were reviewed. ASSESSMENT  Patient continues with skilled PT services and is progressing towards goals. Pt presented resting in bed after dinner and a BM; he was pleasant and cooperative asking how much longer he needed to be in the hospital. He transferred to EOB and demonstrated good sitting balance. When ready pt transferred to standing and was then able to walk around the nursing station and all the way down to the rehab gym w/ 2WW and CGA. His ataxia and stability continue to slowly improve but he still intermittently requires cues to slow down to help reduce fall risk. Once in the rehab gym therapist had pt practice the stairs again. Rather that ascending/descending the stairs in a step over step pattern pt used a step to step pattern placing each foot on one step before moving to the next. This offered a large improvement in his safety and balance on the stairs and he even noted that it felt better to do them this way. Once 2 reps were completed on the steps pt walked all the way back to his room and was assisted back down into bed at his request. He was left resting w/ both tray table and call bell in reach; bed alarm on. Vitals noted during session included: O2 sats on % and HR 74. Current Level of Function Impacting Discharge (mobility/balance): transfers CGA to SBA; balance w/ AD fair (-)    Other factors to consider for discharge: supportive dtrs         PLAN :  Patient continues to benefit from skilled intervention to address the above impairments. Continue treatment per established plan of care. to address goals. Recommendation for discharge: (in order for the patient to meet his/her long term goals)  Physical therapy at least 2 days/week in the home  then outpt PT services.     This discharge recommendation:  Has not yet been discussed the attending provider and/or case management    IF patient discharges home will need the following DME: rolling walker       SUBJECTIVE:   Patient stated I'm feeling good.     OBJECTIVE DATA SUMMARY:   Critical Behavior:  Neurologic State: Alert  Orientation Level: Oriented to person, Oriented to place  Cognition: Follows commands  Safety/Judgement: Fall prevention  Functional Mobility Training:  Bed Mobility:     Supine to Sit: Stand-by assistance;Bed Modified  Sit to Supine: Stand-by assistance  Scooting: Stand-by assistance        Transfers:  Sit to Stand: Stand-by assistance;Contact guard assistance  Stand to Sit: Stand-by assistance;Contact guard assistance                             Balance:  Sitting: Intact  Standing: Impaired; With support  Standing - Static: Constant support; Fair  Standing - Dynamic : Constant support;Fair;Occasional  Ambulation/Gait Training:  Distance (ft): 400 Feet (ft)  Assistive Device: Gait belt;Walker, rolling  Ambulation - Level of Assistance: Contact guard assistance        Gait Abnormalities: Ataxic;Decreased step clearance; Path deviations        Base of Support: Widened     Speed/Padma: Fluctuations  Step Length: Left shortened;Right shortened                    Stairs:  Number of Stairs Trained: 20 (up/down 10 steps x2 reps)  Stairs - Level of Assistance: Contact guard assistance;Stand-by assistance   Rail Use: Both  Pain Rating:  denied    Activity Tolerance:   Good, Fair, SpO2 stable on RA, and requires rest breaks    After treatment patient left in no apparent distress:   Supine in bed, Call bell within reach, and Bed / chair alarm activated    COMMUNICATION/COLLABORATION:   The patients plan of care was discussed with: Registered nurse and Certified nursing assistant/patient care technician.      Ed Porras, PT   Time Calculation: 24 mins

## 2023-04-25 NOTE — PROGRESS NOTES
Bedside shift change report given to [address]sa Torres (oncoming nurse) by Ranjit Kulkarni RN  (offgoing nurse). Report included the following information Kardex.

## 2023-04-25 NOTE — SWING BED INTERDISCIPLINARY CARE PLAN NURSE NOTE
Nursing:    Week #2: Date 4/25/2023  Medical status (changes from previous week):Progressing  Treatment changes this shift: none    Cognition: Present status: dementia          Is cueing needed?: Yes          Frequency of cueing needs: frequent           Type of cueing used: verbal  ADL (general):  Minimum assistance  Activity type: Reality awareness  Participation: Daily  Level of participation: Improving  Pain status: No c/o pain  Patient/Family Education needs: continue current plan    Upon review of the patients current care plan, the following issues, problems, or needs remain: continue current treatment    Issac Sahu RN

## 2023-04-25 NOTE — PROGRESS NOTES
Problem: Self Care Deficits Care Plan (Adult)  Goal: *Acute Goals and Plan of Care (Insert Text)  Description: Goal: Interventions  Outcome: Progressing Towards Goal  Note: Problem: Self Care Deficits Care Plan (Adult)  Goal: *Acute Goals and Plan of Care (Insert Text)  Outcome: Progressing Towards Goal  Goal: Interventions  Note: FUNCTIONAL STATUS PRIOR TO ADMISSION: Patient was modified independent using a walker and WB quad cane for functional mobility. HOME SUPPORT: pt lives alone and did not require support for  BADLs    Occupational Therapy Goals  Initiated 4/19/2023  1. Patient will perform grooming with supervision/set-up within 7 day(s). 2.  Patient will perform bathing with supervision/set-up within 7 day(s). 3.  Patient will perform lower body dressing with supervision/set-up within 7 day(s). 4.  Patient will perform toilet transfers with modified independence within 7 day(s). 5.  Patient will perform all aspects of toileting with modified independence within 7 day(s). 6.  Patient will participate in upper extremity therapeutic exercise/activities with supervision/set-up for 8 minutes within 7 day(s). 7.  Patient will utilize energy conservation techniques during functional activities with verbal cues within 7   Outcome: Not Met    OCCUPATIONAL THERAPY TREATMENT  Patient: Lux Bee (51 y.o. male)  Date: 4/25/2023  Diagnosis: Physical deconditioning [R53.81]  Generalized weakness [R53.1] Encounter for rehabilitation      Precautions:    Chart, occupational therapy assessment, plan of care, and goals were reviewed. ASSESSMENT  Patient continues with skilled OT services. He requires verbal and visual cues throughout most ADL tasks for safety, positioning/management of RW, and to stand straight with his knees and hips extended (he tends to stand flexed, and is then more unsteady). Patient is very pleasant and motivated.  He will likely continue to require at least supervision during ADL tasks for safety. He will continue to benefit from skilled OT treatment. PLAN :  Patient continues to benefit from skilled intervention to address the above impairments. Continue treatment per established plan of care to address goals. Recommendation for discharge: (in order for the patient to meet his/her long term goals)  [facility] vs Home with increased supervision/assist for safety. This discharge recommendation:  Has been made in collaboration with the attending provider and/or case management    IF patient discharges home will need the following DME: bedside commode and walker: rolling       SUBJECTIVE:   Patient stated She lives nearby.  (daughter)    OBJECTIVE DATA SUMMARY:   Cognitive/Behavioral Status:  Neurologic State: Alert  Orientation Level: Disoriented to time;Disoriented to situation;Oriented to person;Oriented to place  Cognition: Follows commands; Impaired decision making;Poor safety awareness  Perception: Appears intact  Perseveration: No perseveration noted  Safety/Judgement: Awareness of environment;Decreased insight into deficits; Decreased awareness of need for safety    Functional Mobility and Transfers for ADLs:  Bed Mobility:  Supine to Sit: Stand-by assistance;Bed Modified  Scooting: Stand-by assistance    Transfers:  Sit to Stand: Stand-by assistance;Contact guard assistance  Functional Transfers  Bathroom Mobility: Contact guard assistance;Minimum assistance  Toilet Transfer : Contact guard assistance  Cues: Verbal cues provided;Visual cues provided  Adaptive Equipment: Grab bars; Walker (comment)  Bed to Chair: Stand-by assistance;Contact guard assistance    Balance:  Sitting: Intact  Standing: Impaired; With support  Standing - Static: Constant support; Fair  Standing - Dynamic : Constant support; Fair    ADL Intervention:       Grooming  Washing Face: Stand-by assistance;Proximal stability  Washing Hands: Stand-by assistance;Proximal stability  Brushing Teeth: Stand-by assistance;Proximal stability  Brushing/Combing Hair: Stand-by assistance;Proximal stability  Cues: Verbal cues provided;Visual cues provided (for safety and positioning of RW)      Lower Body Dressing Assistance  Protective Undergarmet: Moderate assistance;Maximum assistance  Socks: Minimum assistance; Moderate assistance  Shoes with Cloth Laces: Minimum assistance; Compensatory technique training;Proximal stability  Leg Crossed Method Used: Yes  Position Performed: Seated in chair  Cues: Verbal cues provided;Visual cues provided; Tactile cues provided    Toileting  Bladder Hygiene: Supervision  Bowel Hygiene: Supervision; Compensatory technique training (seated)  Clothing Management: Contact guard assistance (Additional time with cues)  Cues: Verbal cues provided;Visual cues provided; Tactile cues provided  Adaptive Equipment: Grab bars; Walker    Cognitive Retraining  Safety/Judgement: Awareness of environment;Decreased insight into deficits; Decreased awareness of need for safety    Pain:  No complaints    Activity Tolerance:   Fair and requires rest breaks    After treatment patient left in no apparent distress:   Sitting in chair, Call bell within reach, and Bed / chair alarm activated    COMMUNICATION/COLLABORATION:   The patients plan of care was discussed with: Physical therapist, Registered nurse, and Rehabilitation technician.      Radha Turner OTR/L  Time Calculation: 25 mins

## 2023-04-25 NOTE — PROGRESS NOTES
Problem: Mobility Impaired (Adult and Pediatric)  Goal: *Acute Goals and Plan of Care (Insert Text)  Description: FUNCTIONAL STATUS PRIOR TO ADMISSION: pt lives alone in a 1 level apt; 4 SHREE w/ B HR; he had been ambulatory w/ a quad cane until about a month ago but due to coordination issues and falling pt had started using a rollator; he has a tub shower combo at home w/ a shower seat and suction grab bars; again due to the coordination issues and fear of falling pt has not been showering much lately and has been opting for a sink \"bird bath\"; pt was intermittently driving up until a few weeks ago but has expressed to his dtr that he thinks he needs to stop driving completely; both pt and dtr express that pt has been MI for B IADLs up to this point including cooking, cleaning and shopping; it was also noted that pt occasionally sleeps in his recliner vs the bed due to back pain    HOME SUPPORT PRIOR TO ADMISSION: dtr lives local and assists PRN but until recently pt has not required much help    Physical Therapy Goals  Initiated 4/19/2023  1. Patient will move from supine to sit and sit to supine in bed with independence within 7 day(s). 2.  Patient will transfer from bed to chair and chair to bed with SBA using the least restrictive device within 7 day(s). 3.  Patient will perform sit to stand with SBA within 7 day(s). 4.  Patient will ambulate with SBA for 300 feet with the least restrictive device within 7 day(s). 5.  Patient will ascend/descend 4 stairs with 1-2 handrail(s) with SBA within 7 day(s).       Outcome: Progressing Towards Goal     Problem: Patient Education: Go to Patient Education Activity  Goal: Patient/Family Education  Outcome: Progressing Towards Goal  PHYSICAL THERAPY TREATMENT  Patient: Miguel Pantoja (89 y.o. male)  Date: 4/25/2023  Diagnosis: Physical deconditioning [R53.81]  Generalized weakness [R53.1] Encounter for rehabilitation      Precautions:    Chart, physical therapy assessment, plan of care and goals were reviewed. ASSESSMENT  Patient continues with skilled PT services and is progressing towards goals. Pt presented resting in bed w/ his shoes donned; pleasant and cooperative. He transferred to EOB and exhibited good sitting balance. When ready pt stood up and walked out into the hallway w/ 2WW, then around nursing station and down to the rehab gym to continue practicing stair negotiation; no LOB, (+) minor SOB. Of note, while walking pt's ataxia was better today and he was walking w/ a smaller (more normalized) CARLOS which made it so pt never kicked or tripped on the AD. Once in the gym he was encouraged to continue using a step to step pattern on the stairs along w/ HR support. He was able to ascend/descend 10 steps x2 reps with better stability and coordination compared to many previous attempts; no LOB. He does still exhibit some difficulty w/ depth perception when on the stairs which is made a bit worse by the stripped non-slip black tape that is on each step. Once completed he was guided back to his room and was left sitting up in the recliner chair for lunch; tray table and call bell in reach; chair alarm on. Vitals noted during session included: O2 sats on RA 96% and HR 79. Current Level of Function Impacting Discharge (mobility/balance): transfers SBA to CGA; balance w/ AD fair     Other factors to consider for discharge: supportive dtrs; pt is ready for the next level of care         PLAN :  Patient continues to benefit from skilled intervention to address the above impairments. Continue treatment per established plan of care. to address goals.     Recommendation for discharge: (in order for the patient to meet his/her long term goals)  Physical therapy at least 2 days/week in the home  then pt would likely benefit from outpt PT services    This discharge recommendation:  Has been made in collaboration with the attending provider and/or case management    IF patient discharges home will need the following DME: rolling walker       SUBJECTIVE:   Patient stated I'm feeling good. ... I'm ready to go home.     OBJECTIVE DATA SUMMARY:   Critical Behavior:  Neurologic State: Alert  Orientation Level: (P) Oriented to person, Disoriented to situation, Disoriented to place, Disoriented to time  Cognition: Follows commands  Safety/Judgement: Fall prevention  Functional Mobility Training:  Bed Mobility:     Supine to Sit: Stand-by assistance;Bed Modified     Scooting: Stand-by assistance        Transfers:  Sit to Stand: Stand-by assistance;Contact guard assistance  Stand to Sit: Stand-by assistance;Contact guard assistance        Bed to Chair: Stand-by assistance;Contact guard assistance                    Balance:  Sitting: Intact  Standing: Impaired; With support  Standing - Static: Constant support; Fair  Standing - Dynamic : Constant support; Fair  Ambulation/Gait Training:  Distance (ft): 520 Feet (ft)  Assistive Device: Gait belt;Walker, rolling  Ambulation - Level of Assistance: Stand-by assistance;Contact guard assistance        Gait Abnormalities: Decreased step clearance; Path deviations        Base of Support: Widened     Speed/Padma: Fluctuations  Step Length: Left shortened;Right shortened                    Stairs:  Number of Stairs Trained: 20 (up/down 10 steps x2 reps)  Stairs - Level of Assistance: Contact guard assistance;Stand-by assistance   Rail Use: Both  Pain Rating:  denied    Activity Tolerance:   Good, Fair, SpO2 stable on RA, requires rest breaks, and observed SOB with activity    After treatment patient left in no apparent distress:   Sitting in chair, Call bell within reach, and Bed / chair alarm activated    COMMUNICATION/COLLABORATION:   The patients plan of care was discussed with: Occupational therapist, Registered nurse, Case management, Rehabilitation technician, and Certified nursing assistant/patient care technician.      Sydnie Gaytan, PT Time Calculation: 23 mins

## 2023-04-26 PROCEDURE — 65270000011 HC RM PRIVATE SNF

## 2023-04-26 PROCEDURE — 74011250636 HC RX REV CODE- 250/636: Performed by: INTERNAL MEDICINE

## 2023-04-26 PROCEDURE — 94760 N-INVAS EAR/PLS OXIMETRY 1: CPT

## 2023-04-26 PROCEDURE — 74011250637 HC RX REV CODE- 250/637: Performed by: PSYCHIATRY & NEUROLOGY

## 2023-04-26 PROCEDURE — 97110 THERAPEUTIC EXERCISES: CPT

## 2023-04-26 PROCEDURE — 74011250637 HC RX REV CODE- 250/637: Performed by: INTERNAL MEDICINE

## 2023-04-26 PROCEDURE — 97535 SELF CARE MNGMENT TRAINING: CPT

## 2023-04-26 RX ADMIN — ENOXAPARIN SODIUM 30 MG: 100 INJECTION SUBCUTANEOUS at 21:16

## 2023-04-26 RX ADMIN — METOPROLOL SUCCINATE 25 MG: 25 TABLET, EXTENDED RELEASE ORAL at 09:50

## 2023-04-26 RX ADMIN — LISINOPRIL 5 MG: 5 TABLET ORAL at 09:50

## 2023-04-26 RX ADMIN — QUETIAPINE FUMARATE 100 MG: 100 TABLET ORAL at 18:07

## 2023-04-26 RX ADMIN — SERTRALINE 50 MG: 50 TABLET, FILM COATED ORAL at 21:16

## 2023-04-26 RX ADMIN — ATORVASTATIN CALCIUM 20 MG: 20 TABLET, FILM COATED ORAL at 21:16

## 2023-04-26 RX ADMIN — ENOXAPARIN SODIUM 30 MG: 100 INJECTION SUBCUTANEOUS at 09:50

## 2023-04-26 RX ADMIN — ASPIRIN 81 MG 81 MG: 81 TABLET ORAL at 09:50

## 2023-04-26 NOTE — PROGRESS NOTES
Problem: Self Care Deficits Care Plan (Adult)  Goal: *Acute Goals and Plan of Care (Insert Text)  Description: Goal: Interventions  Outcome: Progressing Towards Goal  Note: Problem: Self Care Deficits Care Plan (Adult)  Goal: *Acute Goals and Plan of Care (Insert Text)  Outcome: Progressing Towards Goal  Goal: Interventions  Note: FUNCTIONAL STATUS PRIOR TO ADMISSION: Patient was modified independent using a walker and WB quad cane for functional mobility. HOME SUPPORT: pt lives alone and did not require support for  BADLs  Weekly review  Occupational Therapy Goals  Initiated 4/19/2023 reviewed and updated 4/26/23  1. Patient will perform grooming with supervision/set-up within 7 day(s). 2.  Patient will perform bathing with supervision/set-up within 7 day(s). 3.  Patient will perform lower body dressing with supervision/set-up within 7 day(s). 4.  Patient will perform toilet transfers with modified independence within 7 day(s). 5.  Patient will perform all aspects of toileting with modified independence within 7 day(s). 6.  Patient will participate in upper extremity therapeutic exercise/activities with supervision/set-up for 8 minutes within 7 day(s). 7.  Patient will utilize energy conservation techniques during functional activities with verbal cues within 7   Outcome: Progressing Towards Goal  Goal: Interventions  4/26/2023 1211 by Rosmery Ramos OT  Outcome: Progressing Towards Goal  OCCUPATIONAL THERAPY TREATMENT  Patient: Gaye Palacios (79 y.o. male)  Date: 4/26/2023  Diagnosis: Physical deconditioning [R53.81]  Generalized weakness [R53.1] Encounter for rehabilitation      Precautions:    Chart, occupational therapy assessment, plan of care, and goals were reviewed. ASSESSMENT  Patient continues with skilled OT services and is slowly progressing towards goals. Increase standing and activity tolerance noted and increased ease with orienting LB garment for dressing noted.  Pt is still impulsive and need reminder for transfers and for standing at sink. Pt had gait belt on with FWW at sink and needs to steady himself with 1 hand flat against the wall. 1 LOB with OT helping with gait belt. 0 SOB noted during ADL session  this am. Pt able to dress in seated position with set up and cues for orientation and to stand to  CGA/ Min a pull up LB garment. Educated in energy conservation technique. Pt completed functional ambulation after bathing and dressing with cues to stay upright and in box of walker especially while turning or backing up. Current Level of Function Impacting Discharge (ADLs): standing balance limits and need for reminder for sequencing or orientation    Other factors to consider for discharge: plan to live with his daughter until he is able to live Independently         PLAN :  Patient continues to benefit from skilled intervention to address the above impairments. Continue treatment per established plan of care to address goals. Recommend with staff: OOB/ upright in chair    Recommend next OT session: standing ADLS, reinforce safety techniques    Recommendation for discharge: (in order for the patient to meet his/her long term goals)  Occupational therapy at least 2 days/week in the home     This discharge recommendation:  Has not yet been discussed the attending provider and/or case management    IF patient discharges home will need the following DME: patient owns DME required for discharge       SUBJECTIVE:   Patient stated I'm as ready as I will ever be, it hard to sleep at the hospital.    OBJECTIVE DATA SUMMARY:   Cognitive/Behavioral Status: Follows commands,poor sequencing with some tasks  Functional Mobility and Transfers for ADLs:  Bed Mobility:  Rolling: Additional time;Contact guard assistance  Supine to Sit: Stand-by assistance; Additional time  Scooting: Stand-by assistance    Transfers:  Sit to Stand: Contact guard assistance; Additional time  Functional Transfers  Bathroom Mobility: Contact guard assistance  Toilet Transfer : Minimum assistance;Contact guard assistance  Bed to Chair: Contact guard assistance    Balance:  Sitting: Intact  Standing: Impaired    ADL Intervention:     Grooming  Grooming Assistance: Stand-by assistance;Contact guard assistance  Position Performed: Standing  Washing Face: Contact guard assistance (for standing balance)  Washing Hands: Contact guard assistance  Brushing/Combing Hair: Contact guard assistance    Upper Body Bathing  Bathing Assistance: Contact guard assistance;Minimum assistance  Position Performed: Standing    Type of Bath: Partial;[place]/Soap/Water    Upper Body Dressing Assistance  Pullover Shirt: Stand-by assistance (cues to orient garment)    Lower Body Dressing Assistance  Pants With Elastic Waist: Modified independent;Stand-by assistance  Socks: Stand-by assistance;Modified independent    Toileting  Toileting Assistance: Contact guard assistance;Stand-by assistance  Clothing Management: Minimum assistance  Cues: Verbal cues provided; Tactile cues provided    Therapeutic Exercises:   Functional ambulation with standing rests, emphasis on good standing balance    Pain:  None reported    Activity Tolerance:   Fair    After treatment patient left in no apparent distress:   Sitting in chair, Call bell within reach, and Bed / chair alarm activated    COMMUNICATION/COLLABORATION:   The patients plan of care was discussed with: Physical therapist, Registered nurse, and Certified nursing assistant/patient care technician.      Brenna Giron OT  Time Calculation: 41 mins

## 2023-04-26 NOTE — PROGRESS NOTES
Follow up visit with patient in Rm 130  Provided empathic listening and spiritual support  Advised of  Availability    [address] [place] [address]

## 2023-04-26 NOTE — PROGRESS NOTES
Problem: Falls - Risk of  Goal: *Absence of Falls  Description: Document Summer Ortiz Fall Risk and appropriate interventions in the flowsheet. Outcome: Progressing Towards Goal  Note: Fall Risk Interventions:                                Problem: Patient Education: Go to Patient Education Activity  Goal: Patient/Family Education  Outcome: Progressing Towards Goal     Problem: Pressure Injury - Risk of  Goal: *Prevention of pressure injury  Description: Document Eddie Scale and appropriate interventions in the flowsheet.   Outcome: Progressing Towards Goal  Note: Pressure Injury Interventions:  Sensory Interventions: Assess changes in LOC, Check visual cues for pain    Moisture Interventions: Check for incontinence Q2 hours and as needed    Activity Interventions: Increase time out of bed    Mobility Interventions: PT/OT evaluation    Nutrition Interventions: Document food/fluid/supplement intake    Friction and Shear Interventions: Lift sheet                Problem: Patient Education: Go to Patient Education Activity  Goal: Patient/Family Education  Outcome: Progressing Towards Goal     Problem: Patient Education: Go to Patient Education Activity  Goal: Patient/Family Education  Outcome: Progressing Towards Goal     Problem: Patient Education: Go to Patient Education Activity  Goal: Patient/Family Education  Outcome: Progressing Towards Goal

## 2023-04-26 NOTE — PROGRESS NOTES
Problem: Falls - Risk of  Goal: *Absence of Falls  Description: Document Radha Hernandez Fall Risk and appropriate interventions in the flowsheet. Outcome: Progressing Towards Goal  Note: Fall Risk Interventions:                                Problem: Patient Education: Go to Patient Education Activity  Goal: Patient/Family Education  Outcome: Progressing Towards Goal     Problem: Pressure Injury - Risk of  Goal: *Prevention of pressure injury  Description: Document Eddie Scale and appropriate interventions in the flowsheet.   Outcome: Progressing Towards Goal  Note: Pressure Injury Interventions:  Sensory Interventions: Assess changes in LOC, Check visual cues for pain    Moisture Interventions: Check for incontinence Q2 hours and as needed    Activity Interventions: Increase time out of bed    Mobility Interventions: PT/OT evaluation    Nutrition Interventions: Document food/fluid/supplement intake    Friction and Shear Interventions: Lift sheet                Problem: Patient Education: Go to Patient Education Activity  Goal: Patient/Family Education  Outcome: Progressing Towards Goal

## 2023-04-26 NOTE — PROGRESS NOTES
Comprehensive Nutrition Assessment    Type and Reason for Visit: Reassess    Nutrition Recommendations/Plan:   Regular 4 carb choice, low fat/low chol/high fiber/JASON  Encourage po intake      Malnutrition Assessment:  Malnutrition Status:  Mild malnutrition (04/20/23 1306)    Context:  Acute illness     Findings of the 6 clinical characteristics of malnutrition:   Energy Intake:  75% or less of est energy req for 7 or more days  Weight Loss:  No significant weight loss     Body Fat Loss:  No significant body fat loss,     Muscle Mass Loss:  No significant muscle mass loss,    Fluid Accumulation:  No significant fluid accumulation,     Strength:  Not performed     Nutrition Assessment:  Chart reviewed. Pt po intake is ranging but mostly fair to good, he says his appetite is good, he likes when I tell him he has lost wt. He lost 9#/3.4% in 1 month not significant. He is able to feed himself. Encourage po intake. Nutrition Related Findings:      Wound Type: None    Current Nutrition Intake & Therapies:  Average Meal Intake: 51-75%  Average Supplement Intake: None ordered  ADULT DIET Regular; 4 carb choices (60 gm/meal); Low Fat/Low Chol/High Fiber/JASON  Patient Vitals for the past 168 hrs:   % Diet Eaten   04/26/23 1200 1 - 25%   04/26/23 0800 76 - 100%   04/25/23 1251 1 - 25%   04/25/23 0800 1 - 25%   04/24/23 1719 26 - 50%   04/24/23 1215 51 - 75%   04/23/23 1800 76 - 100%   04/23/23 1200 0%   04/22/23 1829 51 - 75%   04/22/23 1212 26 - 50%   04/22/23 0815 51 - 75%   04/19/23 1700 76 - 100%       Anthropometric Measures:  Height: 6' (182.9 cm)  Ideal Body Weight (IBW): 178 lbs (81 kg)  Current Body Wt:  110.4 kg (243 lb 6.2 oz), 141.6 % IBW. Current BMI (kg/m2): 33  BMI Category: Obese class 1 (BMI 30.0-34. 9)  Weight Loss Metrics 4/23/2023 4/11/2023 3/14/2023 2/14/2023 9/26/2022 9/12/2022 8/23/2022   Today's Wt 243 lb 4.8 oz - 252 lb 245 lb 253 lb 263 lb 263 lb   BMI 33 kg/m2 34.18 kg/m2 36.16 kg/m2 34.17 kg/m2 34.31 kg/m2 35.67 kg/m2 35.67 kg/m2       Estimated Daily Nutrient Needs:  Energy Requirements Based On: Formula  Weight Used for Energy Requirements: Admission  Energy (kcal/day): 2264  Weight Used for Protein Requirements: Admission  Protein (g/day): 114  Method Used for Fluid Requirements: 1 ml/kcal  Fluid (ml/day): 2264    Nutrition Diagnosis:   Inadequate oral intake related to psychological cause or life stress as evidenced by intake 0-25%, intake 26-50%, intake 51-75%    Nutrition Interventions:   Food and/or Nutrient Delivery: Continue current diet  Nutrition Education/Counseling: No recommendations at this time  Coordination of Nutrition Care: Continue to monitor while inpatient, Coordination of care  Plan of Care discussed with: pt    Goals:  Previous Goal Met: Progressing toward goal(s)  Goals: Meet at least 75% of estimated needs, by next RD assessment       Nutrition Monitoring and Evaluation:   Behavioral-Environmental Outcomes: None identified  Food/Nutrient Intake Outcomes: Food and nutrient intake  Physical Signs/Symptoms Outcomes: Weight, Meal time behavior    Discharge Planning:    Continue current diet    Luz Hadley RD

## 2023-04-26 NOTE — PROGRESS NOTES
Problem: Mobility Impaired (Adult and Pediatric)  Goal: *Acute Goals and Plan of Care (Insert Text)  Description: FUNCTIONAL STATUS PRIOR TO ADMISSION: pt lives alone in a 1 level apt; 4 SHREE w/ B HR; he had been ambulatory w/ a quad cane until about a month ago but due to coordination issues and falling pt had started using a rollator; he has a tub shower combo at home w/ a shower seat and suction grab bars; again due to the coordination issues and fear of falling pt has not been showering much lately and has been opting for a sink \"bird bath\"; pt was intermittently driving up until a few weeks ago but has expressed to his dtr that he thinks he needs to stop driving completely; both pt and dtr express that pt has been MI for B IADLs up to this point including cooking, cleaning and shopping; it was also noted that pt occasionally sleeps in his recliner vs the bed due to back pain    HOME SUPPORT PRIOR TO ADMISSION: dtr lives local and assists PRN but until recently pt has not required much help    Physical Therapy Goals  Initiated 4/19/2023  1. Patient will move from supine to sit and sit to supine in bed with independence within 7 day(s). 2.  Patient will transfer from bed to chair and chair to bed with SBA using the least restrictive device within 7 day(s). 3.  Patient will perform sit to stand with SBA within 7 day(s). 4.  Patient will ambulate with SBA for 300 feet with the least restrictive device within 7 day(s). 5.  Patient will ascend/descend 4 stairs with 1-2 handrail(s) with SBA within 7 day(s). Physical Therapy Goals  Weekly Progress Note 4/26/2023; goals reviewed and updated  1. Patient will move from supine to sit and sit to supine in bed with independence within 7 day(s). (Continue; currently SBA)  2.  Patient will transfer from bed to chair and chair to bed with SBA using the least restrictive device within 7 day(s). (Continue; currently CGA to SBA)  3.   Patient will perform sit to stand with SBA within 7 day(s). (Continue; currently CGA to SBA)  4. Patient will ambulate with SBA for 300 feet with the least restrictive device within 7 day(s). (Continue; currently up to 520' but CGA to SBA)  5. Patient will ascend/descend 4 stairs with 1-2 handrail(s) with SBA within 7 day(s). (Continue; currently up/down 10 steps w/ B HR and anywhere from SBA to min A)    Outcome: Progressing Towards Goal     Problem: Patient Education: Go to Patient Education Activity  Goal: Patient/Family Education  Outcome: Progressing Towards Goal  PHYSICAL THERAPY TREATMENT: WEEKLY REASSESSMENT  Patient: Wendi Gallegos (69 y.o. male)  Date: 4/26/2023  Primary Diagnosis: Physical deconditioning [R53.81]  Generalized weakness [R53.1]       Precautions: inconsistent safety awareness, coordination and balance w/ frequent ataxia; intermittent confusion w/ increased fall risk          ASSESSMENT  Patient continues with skilled PT services and is progressing towards goals. Pt presented resting in bed; pleasant and cooperative but some noted confusion via conversation. Of note, earlier in the day pt's dtr Katharina was in the room and wanted to discuss progress and current challenges w/ her father; d/c plans were also reviewed w/ pt, dtr and CM in pt's room. During PM session pt was able to transfer to EOB and demonstrated good sitting balance. He was then able to transfer to standing w/ limited assistance/cues and then pt was able to walk w/ 2WW all the way to the rehab gym. Once again therapist had pt practice stairs. Pt required frequent and repeated cues to use a step to step pattern to help increase his safety and overall he was able to do this w/ 2-3 ataxic mis-steps noted. However, pt did experience (+) LOB x1 when descending the stairs w/ a posterior lean and therapist had to offer min A to help him recover; no falling. Pt was then guided back to his room and was left sitting up in the recliner chair.  Pt then began questioning therapist about the differences between the call bell, TV remote and the room phone; pt was very confused about which one to use for what need/desire and therapist took several minutes showing and explaining to pt how/when to use each one; pt has been using these devices for several weeks at this point. He was left resting w/ both tray table and call bell in reach; chair alarm on. Vitals noted during session included: O2 sats on % and HR 71. Patient's progression toward goals since last assessment: as noted in goals section; see for details    Current Level of Function Impacting Discharge (mobility/balance): transfers CGA to SBA; balance w/ AD fair (-)    Other factors to consider for discharge: supportive dtr         PLAN :  Goals have been updated based on progression since last assessment. Patient continues to benefit from skilled intervention to address the above impairments. Recommendations and Planned Interventions: bed mobility training, transfer training, gait training, therapeutic exercises, neuromuscular re-education, modalities, edema management/control, patient and family training/education, and therapeutic activities      Frequency/Duration: Patient will be followed by physical therapy:  6 times a week to address goals. Recommendation for discharge: (in order for the patient to meet his/her long term goals)  Physical therapy at least 2 days/week in the home     This discharge recommendation:  Has been made in collaboration with the attending provider and/or case management    IF patient discharges home will need the following DME: rolling walker         SUBJECTIVE:   Patient stated I'm ok. .. I'm a little confused as to what's going on when I leave here.     OBJECTIVE DATA SUMMARY:   HISTORY:    Past Medical History:   Diagnosis Date    Depression     Hypercholesterolemia     Insomnia     Obesity     Peripheral neuropathy     PVC's (premature ventricular contractions)     Type 2 diabetes mellitus treated without insulin [facility])      Past Surgical History:   Procedure Laterality Date    ENDOSCOPY, COLON, DIAGNOSTIC  2003, 2010    [place] UNLISTED PROCEDURE CARDIAC SURGERY  9/2010    catheterization       Personal factors and/or comorbidities impacting plan of care: complex medical hx    Home Situation  Home Environment: Apartment  # Steps to Enter: 0  Rails to Enter: No  One/Two Story Residence: One story  Living Alone: Yes  Support Systems: Child(illiam)  Patient Expects to be Discharged to[de-identified] Other: (daughter would come lives with him)  Current DME Used/Available at Home: [address]    EXAMINATION/PRESENTATION/DECISION MAKING:   Critical Behavior:  Neurologic State: Alert, Confused  Orientation Level: Oriented to person, Oriented to place, Oriented to situation, Disoriented to time  Cognition: Follows commands  Safety/Judgement: Fall prevention, Decreased insight into deficits, Decreased awareness of need for safety, Decreased awareness of need for assistance  Hearing: Auditory  Auditory Impairment: None               Functional Mobility:  Bed Mobility:  Rolling: Additional time;Contact guard assistance  Supine to Sit: Stand-by assistance;Bed Modified     Scooting: Stand-by assistance  Transfers:  Sit to Stand: Stand-by assistance;Contact guard assistance  Stand to Sit: Contact guard assistance;Stand-by assistance        Bed to Chair: Contact guard assistance              Balance:   Sitting: Intact  Standing: Impaired; With support  Standing - Static: Constant support;Fair;Occasional  Standing - Dynamic : Constant support;Occasional;Fair  Ambulation/Gait Training:  Distance (ft): 500 Feet (ft)  Assistive Device: Gait belt;Walker, rolling  Ambulation - Level of Assistance: Contact guard assistance;Stand-by assistance        Gait Abnormalities: Ataxic;Decreased step clearance; Path deviations        Base of Support: Widened     Speed/Padma: Fluctuations  Step Length: Left shortened;Right shortened Stairs:  Number of Stairs Trained: 20 (up/down 10 steps x2 reps)  Stairs - Level of Assistance: Contact guard assistance;Minimum assistance   Rail Use: Both      Pain Rating:  denied    Activity Tolerance:   Good, Fair, SpO2 stable on RA, and requires rest breaks    After treatment patient left in no apparent distress:   Sitting in chair, Call bell within reach, and Bed / chair alarm activated    COMMUNICATION/EDUCATION:   The patients plan of care was discussed with: Occupational therapist, Registered nurse, Physician, Case management, Rehabilitation technician, and Certified nursing assistant/patient care technician. Fall prevention education was provided and the patient/caregiver indicated understanding., Patient/family have participated as able in goal setting and plan of care. , and Patient/family agree to work toward stated goals and plan of care.     Thank you for this referral.  Malorie Thompson, PT   Time Calculation: 26 mins

## 2023-04-26 NOTE — PROGRESS NOTES
Pt. Rested quietly throughout the shift without complaint. End of shift report given to JAGRUTI Silveira RN using SBAR and aKardex.

## 2023-04-26 NOTE — PROGRESS NOTES
Bedside and Verbal shift change report given to MARYANNE Keenan (oncoming nurse) by Joselyn Ken RN (offgoing nurse). Report included the following information SBAR, Kardex, Intake/Output, MAR, and Recent Results.

## 2023-04-26 NOTE — SWING BED INTERDISCIPLINARY CARE PLAN NURSE NOTE
Nursing:    Week #2: Date 4/26/2023  Medical status (changes from previous week):Progressing  Treatment changes this shift: None  Cognition: Present status: dementia          Is cueing needed?: Yes          Frequency of cueing needs: occasional           Type of cueing used: verbal  ADL (general):  Minimum assistance  Activity type: Social leisure skills  Participation: Daily  Level of participation: Improving  Pain status: No c/o pain  Patient/Family Education needs: safety    Upon review of the patients current care plan, the following issues, problems, or needs remain: safety    Timothy Chahal RN

## 2023-04-26 NOTE — PROGRESS NOTES
Spoke with patients daughter about discharge plans. She was under impression that skilled care at a SNF was an option. Told her that was NOT an option because he was DENIED further skilled care days here OR at a facility. Told her the only way he could go to a facility is for long term care which would be paid out of pocket. She states that was NOT an option. After further discussion, the plan is for patient to go to her home at [address] [place] [address] E, [address], [address] [facility]. He will go with home health. Will see which home health can take his insurance. Daughter is aware that it is recommended her get 24/7 care at this time because of his instability. Daughter was recommended to research caregivers early on in patient's admission and while he was in skilled care but did not. Provided her list of personal care aide and made her aware of the aides that have been reaching out to  for work . Told her she would have to pay them privately as well. She stated understanding. Patient WILL be discharged Friday because insurance company has denied further days. [id number]: Roddy [id number]. They are delayed PT till week of the 8th. No far out. Called [facility] and spoke with Soila. They will have to run insurance. They do NOT have OT right now. Told her that should be okay. Waiting to hear back to see if they approve insurance.

## 2023-04-27 ENCOUNTER — TELEPHONE (OUTPATIENT)
Dept: FAMILY MEDICINE CLINIC | Age: 83
End: 2023-04-27

## 2023-04-27 PROCEDURE — 97112 NEUROMUSCULAR REEDUCATION: CPT

## 2023-04-27 PROCEDURE — 74011250637 HC RX REV CODE- 250/637: Performed by: PSYCHIATRY & NEUROLOGY

## 2023-04-27 PROCEDURE — 74011250636 HC RX REV CODE- 250/636: Performed by: INTERNAL MEDICINE

## 2023-04-27 PROCEDURE — 97535 SELF CARE MNGMENT TRAINING: CPT

## 2023-04-27 PROCEDURE — 74011250637 HC RX REV CODE- 250/637: Performed by: INTERNAL MEDICINE

## 2023-04-27 PROCEDURE — 65270000011 HC RM PRIVATE SNF

## 2023-04-27 PROCEDURE — 97110 THERAPEUTIC EXERCISES: CPT

## 2023-04-27 PROCEDURE — 94760 N-INVAS EAR/PLS OXIMETRY 1: CPT

## 2023-04-27 RX ADMIN — ASPIRIN 81 MG 81 MG: 81 TABLET ORAL at 09:14

## 2023-04-27 RX ADMIN — METOPROLOL SUCCINATE 25 MG: 25 TABLET, EXTENDED RELEASE ORAL at 09:14

## 2023-04-27 RX ADMIN — ATORVASTATIN CALCIUM 20 MG: 20 TABLET, FILM COATED ORAL at 21:13

## 2023-04-27 RX ADMIN — QUETIAPINE FUMARATE 100 MG: 100 TABLET ORAL at 17:06

## 2023-04-27 RX ADMIN — LISINOPRIL 5 MG: 5 TABLET ORAL at 09:14

## 2023-04-27 RX ADMIN — SERTRALINE 50 MG: 50 TABLET, FILM COATED ORAL at 21:13

## 2023-04-27 RX ADMIN — POLYETHYLENE GLYCOL 3350 17 G: 17 POWDER, FOR SOLUTION ORAL at 17:06

## 2023-04-27 RX ADMIN — ENOXAPARIN SODIUM 30 MG: 100 INJECTION SUBCUTANEOUS at 21:13

## 2023-04-27 RX ADMIN — ENOXAPARIN SODIUM 30 MG: 100 INJECTION SUBCUTANEOUS at 09:14

## 2023-04-27 NOTE — PROGRESS NOTES
Problem: Falls - Risk of  Goal: *Absence of Falls  Description: Document Curt Christians Fall Risk and appropriate interventions in the flowsheet.   Outcome: Progressing Towards Goal  Note: Fall Risk Interventions:

## 2023-04-27 NOTE — TELEPHONE ENCOUNTER
I have called and spoke to ANGELIQUE. Per Dr Terry Pastor:      Call [facility], ok to give verbal orders for home services   MIRLANDE Katz verbalizes understanding.

## 2023-04-27 NOTE — PROGRESS NOTES
Problem: Self Care Deficits Care Plan (Adult)  Goal: *Acute Goals and Plan of Care (Insert Text)  Description: Goal: Interventions  Outcome: Progressing Towards Goal  Note: Problem: Self Care Deficits Care Plan (Adult)  Goal: *Acute Goals and Plan of Care (Insert Text)  Outcome: Progressing Towards Goal  Goal: Interventions  Note: FUNCTIONAL STATUS PRIOR TO ADMISSION: Patient was modified independent using a walker and WB quad cane for functional mobility. HOME SUPPORT: pt lives alone and did not require support for  BADLs    Occupational Therapy Goals  Initiated 4/19/2023 reviewed 4/26/23  1. Patient will perform grooming with supervision/set-up within 7 day(s). 2.  Patient will perform bathing with supervision/set-up within 7 day(s). 3.  Patient will perform lower body dressing with supervision/set-up within 7 day(s). 4.  Patient will perform toilet transfers with modified independence within 7 day(s). 5.  Patient will perform all aspects of toileting with modified independence within 7 day(s). 6.  Patient will participate in upper extremity therapeutic exercise/activities with supervision/set-up for 8 minutes within 7 day(s). 7.  Patient will utilize energy conservation techniques during functional activities with verbal cues within 7   Outcome: Progressing Towards Goal   OCCUPATIONAL THERAPY TREATMENT  Patient: Jimmy Morris (73 y.o. male)  Date: 4/27/2023  Diagnosis: Physical deconditioning [R53.81]  Generalized weakness [R53.1] Encounter for rehabilitation      Precautions:  confusion, decreased safety, fall risk  Chart, occupational therapy assessment, plan of care, and goals were reviewed. ASSESSMENT  Patient continues with skilled OT services and is slowly progressing towards goals with improved activity tolerance,balance and safety but  at times with more confusion and poor hand dexterity with pill rolling motion noted. Pt overall needs supervision and cues.  Pt seen today in the pm to baldo socks and shoes, to toilet, to work on hand dexterity. Pt is agreeable and can baldo socks with CGA/Marlon, shoes with Min a but pt cannot tie them in sitting and reaching down or in crossing leg to either side. Pt is able to tie the shoe when it is on the table in front of him but he struggled initially. Pt also reports he usually wears slip on shoes. Pt is scheduled to be discharged tomorrow and is going to his daughters' house with a ramp to get in. Pt reported seeing red flashing spots on the floors, another kind of alarm, he reported. Pt returned to chair after session. Pt was pleasant  and conversational during the entire session. He had little insight to  his gripped hands but said  that happens sometimes. Current Level of Function Impacting Discharge (ADLs): supervision to Mod A    Other factors to consider for discharge: confusion, impulsive         PLAN :  Patient continues to benefit from skilled intervention to address the above impairments. Continue treatment per established plan of care to address goals. Recommend next OT session: standing adls and hand dexterity tasks    Recommendation for discharge: (in order for the patient to meet his/her long term goals)  Occupational therapy at least 2 days/week in the home AND ensure assist and/or supervision for safety with basic ADLS and mobility    This discharge recommendation:  Has been made in collaboration with the attending provider and/or case management    IF patient discharges home will need the following DME: patient owns DME required for discharge       SUBJECTIVE:   Patient stated my hands just stay closed sometimes.     OBJECTIVE DATA SUMMARY:   Cognitive/Behavioral Status:  Neurologic State: Alert  Orientation Level: Oriented to person;Oriented to place; Disoriented to time  Cognition: Follows commands   Confusion at times   Safety/Judgement: Fall prevention;Decreased insight into deficits (Simultaneous filing.  User may not have seen previous data.)    Functional Mobility and Transfers for ADLs:  Bed Mobility:  Supine to Sit: Stand-by assistance  Scooting: Stand-by assistance  Transfers:  Sit to Stand: Stand-by assistance;Contact guard assistance  Functional Transfers  Bathroom Mobility: Contact guard assistance  Toilet Transfer : Contact guard assistance;Stand-by assistance  Bed to Chair: Stand-by assistance;Contact guard assistance    Balance:  Sitting: Intact (Simultaneous filing. User may not have seen previous data.)  Standing: Impaired; With support (Simultaneous filing. User may not have seen previous data.)  Standing - Static: Constant support; Fair (Simultaneous filing. User may not have seen previous data.)  Standing - Dynamic : Constant support;Fair;Occasional (Simultaneous filing. User may not have seen previous data.)    ADL Intervention:     Lower Body Dressing Assistance  Protective Undergarmet: Minimum assistance; Moderate assistance  Pants With Elastic Waist: Minimum assistance (needs help to pull up in rear)  Socks: Minimum assistance; Moderate assistance  Shoes with Cloth Laces: Minimum assistance (unable to tie/usually wears slip ons)    Toileting  Toileting Assistance: Contact guard assistance; Moderate assistance  Bladder Hygiene: Contact guard assistance  Bowel Hygiene: Moderate assistance  Clothing Management: Moderate assistance  Cues: Verbal cues provided; Tactile cues provided    Cognitive Retraining  Safety/Judgement: Fall prevention;Decreased insight into deficits (Simultaneous filing.  User may not have seen previous data.)    Therapeutic Exercises: Hand dexterity work with tying shoes, opening/shutting gait belt, rolling gait belt, tying draw string    Pain:  0/10    Activity Tolerance:   Fair    After treatment patient left in no apparent distress:   Sitting in chair, Call bell within reach, and Bed / chair alarm activated    COMMUNICATION/COLLABORATION:   The patients plan of care was discussed with: Physical therapist, Occupational therapist, Registered nurse, and Rehabilitation technician.      Lisseth Fernandez OT  Time Calculation: 28 mins

## 2023-04-27 NOTE — TELEPHONE ENCOUNTER
Caden Louis with [facility] would like a verbal okay for Dr. Verlie Curling to follow this patient for home health services.   CVB# is [phone]

## 2023-04-27 NOTE — PROGRESS NOTES
Problem: Falls - Risk of  Goal: *Absence of Falls  Description: Document Radha Hernandez Fall Risk and appropriate interventions in the flowsheet. Outcome: Progressing Towards Goal  Note: Fall Risk Interventions:                                Problem: Patient Education: Go to Patient Education Activity  Goal: Patient/Family Education  Outcome: Progressing Towards Goal     Problem: Pressure Injury - Risk of  Goal: *Prevention of pressure injury  Description: Document Eddie Scale and appropriate interventions in the flowsheet.   Outcome: Progressing Towards Goal  Note: Pressure Injury Interventions:  Sensory Interventions: Maintain/enhance activity level, Keep linens dry and wrinkle-free    Moisture Interventions: Absorbent underpads    Activity Interventions: PT/OT evaluation    Mobility Interventions: PT/OT evaluation    Nutrition Interventions: Document food/fluid/supplement intake    Friction and Shear Interventions: Apply protective barrier, creams and emollients                Problem: Patient Education: Go to Patient Education Activity  Goal: Patient/Family Education  Outcome: Progressing Towards Goal

## 2023-04-27 NOTE — SWING BED INTERDISCIPLINARY CARE PLAN NURSE NOTE
Nursing:    Week #2: Date 4/27/2023  Medical status (changes from previous week):Progressing  Treatment changes this shift: None  Cognition: Present status: dementia          Is cueing needed?: Yes          Frequency of cueing needs: occasional           Type of cueing used: verbal  ADL (general):  Minimum assistance  Activity type: Social leisure skills  Participation: Daily  Level of participation: Improving  Pain status: No c/o pain  Patient/Family Education needs: safety    Upon review of the patients current care plan, the following issues, problems, or needs remain: safety    Betina Ojeda RN Follow up in office in 2 days.

## 2023-04-27 NOTE — PROGRESS NOTES
Bedside and Verbal shift change report given to MARYANNE Montero RN (oncoming nurse) by Carlos Livingston RN (offgoing nurse). Report included the following information SBAR, Kardex, Intake/Output, MAR, and Recent Results.

## 2023-04-27 NOTE — SWING BED INTERDISCIPLINARY CARE PLAN NURSE NOTE
Nursing:    Week #2: Date 4/27/2023  Medical status (changes from previous week):Progressing  Treatment changes this shift: none  Cognition: Present status: dementia          Is cueing needed?: Yes          Frequency of cueing needs: frequent           Type of cueing used: verbal  ADL (general):  Minimum assistance  Activity type: Social leisure skills  Participation: Daily  Level of participation: Improving  Pain status: No c/o pain  Patient/Family Education needs: safety    Upon review of the patients current care plan, the following issues, problems, or needs remain: safety    Iam Moralez LPN

## 2023-04-27 NOTE — PROGRESS NOTES
Problem: Mobility Impaired (Adult and Pediatric)  Goal: *Acute Goals and Plan of Care (Insert Text)  Description: FUNCTIONAL STATUS PRIOR TO ADMISSION: pt lives alone in a 1 level apt; 4 SHREE w/ B HR; he had been ambulatory w/ a quad cane until about a month ago but due to coordination issues and falling pt had started using a rollator; he has a tub shower combo at home w/ a shower seat and suction grab bars; again due to the coordination issues and fear of falling pt has not been showering much lately and has been opting for a sink \"bird bath\"; pt was intermittently driving up until a few weeks ago but has expressed to his dtr that he thinks he needs to stop driving completely; both pt and dtr express that pt has been MI for B IADLs up to this point including cooking, cleaning and shopping; it was also noted that pt occasionally sleeps in his recliner vs the bed due to back pain    HOME SUPPORT PRIOR TO ADMISSION: dtr lives local and assists PRN but until recently pt has not required much help    Physical Therapy Goals  Initiated 4/19/2023  1. Patient will move from supine to sit and sit to supine in bed with independence within 7 day(s). 2.  Patient will transfer from bed to chair and chair to bed with SBA using the least restrictive device within 7 day(s). 3.  Patient will perform sit to stand with SBA within 7 day(s). 4.  Patient will ambulate with SBA for 300 feet with the least restrictive device within 7 day(s). 5.  Patient will ascend/descend 4 stairs with 1-2 handrail(s) with SBA within 7 day(s). Physical Therapy Goals  Weekly Progress Note 4/26/2023; goals reviewed and updated  1. Patient will move from supine to sit and sit to supine in bed with independence within 7 day(s). (Continue; currently SBA)  2.  Patient will transfer from bed to chair and chair to bed with SBA using the least restrictive device within 7 day(s). (Continue; currently CGA to SBA)  3.   Patient will perform sit to stand with SBA within 7 day(s). (Continue; currently CGA to SBA)  4. Patient will ambulate with SBA for 300 feet with the least restrictive device within 7 day(s). (Continue; currently up to 520' but CGA to SBA)  5. Patient will ascend/descend 4 stairs with 1-2 handrail(s) with SBA within 7 day(s). (Continue; currently up/down 10 steps w/ B HR and anywhere from SBA to min A)    4/27/2023 1758 by Adrianna Moctezuma, PT  Outcome: Progressing Towards Goal  4/27/2023 1359 by Adrianna Moctezuma, PT  Outcome: Progressing Towards Goal     Problem: Patient Education: Go to Patient Education Activity  Goal: Patient/Family Education  4/27/2023 1758 by Adrianna Moctezuma, PT  Outcome: Progressing Towards Goal  4/27/2023 1359 by Adrianna Moctezuma, PT  Outcome: Progressing Towards Goal  PHYSICAL THERAPY TREATMENT  Patient: Jimmy Morris (13 y.o. male)  Date: 4/27/2023  Diagnosis: Physical deconditioning [R53.81]  Generalized weakness [R53.1] Encounter for rehabilitation      Precautions:    Chart, physical therapy assessment, plan of care and goals were reviewed. ASSESSMENT  Patient continues with skilled PT services and is progressing towards goals. Pt presented sitting up in recliner chair watching TV; pleasant and cooperative. He was assisted into the restroom to wash his hands w/ CGA/SBA; 2WW; no LOB. Once completed pt walked down the hallway to the rehab gym w/ 2WW exhibiting intermittent ataxia w/ WBOS and flexed trunk; cues to correct. Once in rehab gym pt was challenged w/ ascending/descending the steps 2x w/ B HR support; cues for step to step pattern. He only demonstrated 2-3 missteps today on the stairs where he did not place his foot completely on the step which increases fall risk; no LOB but cues offered to slow down and to correct. Once completed pt was challenged w/ both standing therex and balance activities in the // bars; details noted below.  Pt exhibited several (+) LOBs when working on balance activities on a solid surface requiring min/mod A to recover. He also required a few seated rest breaks while working in the // bars. At end of session pt was assisted back to his room w/ 2WW and he was left resting comfortably in the recliner chair w/ both tray table and call bell in reach; chair alarm on. Vitals noted during session included: O2 sats on RA 93% and HR 67.    Current Level of Function Impacting Discharge (mobility/balance): transfers CGA to SBA; balance w/ AD fair (-)    Other factors to consider for discharge: supportive dtrs         PLAN :  Patient continues to benefit from skilled intervention to address the above impairments. Continue treatment per established plan of care. to address goals. Recommendation for discharge: (in order for the patient to meet his/her long term goals)  Physical therapy at least 2 days/week in the home     This discharge recommendation:  Has been made in collaboration with the attending provider and/or case management    IF patient discharges home will need the following DME: patient owns DME required for discharge       SUBJECTIVE:   Patient stated I'm a little concern about d/c'ing tomorrow. .. I don't want to be a burden to my dtrs.     OBJECTIVE DATA SUMMARY:   Critical Behavior:  Neurologic State: Alert  Orientation Level: Oriented to person, Oriented to place, Disoriented to time  Cognition: Follows commands  Safety/Judgement: Fall prevention  Functional Mobility Training:  Bed Mobility:     Supine to Sit: Stand-by assistance     Scooting: Stand-by assistance        Transfers:  Sit to Stand: Stand-by assistance;Contact guard assistance  Stand to Sit: Stand-by assistance;Contact guard assistance        Bed to Chair: Stand-by assistance;Contact guard assistance                    Balance:  Sitting: Intact (Simultaneous filing. User may not have seen previous data.)  Standing: Impaired; With support  Standing - Static: Constant support;Fair;Occasional  Standing - Dynamic : Constant support;Fair;Occasional  Ambulation/Gait Training:  Distance (ft): 450 Feet (ft)  Assistive Device: Gait belt;Walker, rolling  Ambulation - Level of Assistance: Stand-by assistance;Contact guard assistance        Gait Abnormalities: Ataxic;Decreased step clearance; Path deviations        Base of Support: Widened     Speed/Padma: Pace decreased (<100 feet/min)  Step Length: Left shortened;Right shortened                    Stairs:  Number of Stairs Trained: 20 (up/down 10 steps x2 reps)  Stairs - Level of Assistance: Contact guard assistance;Stand-by assistance   Rail Use: Both    Therapeutic Exercises:   Standing therex in // bars included: heel raises, marching, knee curls and hip abd/add; each completed for 1-2 sets and reps up to 15 as tolerated; then NMR activities included: all on solid surface= feet apart then together then partial tandem w/ various amounts of UE support and progressing from eyes open to eyes closed; each position completed x2-3 reps for 10-15 seconds  Pain Rating:  denied    Activity Tolerance:   Fair, SpO2 stable on RA, and requires rest breaks    After treatment patient left in no apparent distress:   Sitting in chair, Call bell within reach, and Bed / chair alarm activated    COMMUNICATION/COLLABORATION:   The patients plan of care was discussed with: Occupational therapist, Registered nurse, Physician, and Case management.      Berta Kaur, PT   Time Calculation: 40 mins

## 2023-04-27 NOTE — PROGRESS NOTES
Bedside shift change report given to JAGRUTI Silveira RN (oncoming nurse) by MARYANNE Keenan LPN (offgoing nurse). Report included the following information SBAR, Kardex, Intake/Output, MAR, and Recent Results.

## 2023-04-27 NOTE — PROGRESS NOTES
Telephone conversation with Yong of [facility]. They are unable to take Mr. Buck Medina for skilled home health services. I called Hira Dave at [facility], [phone]. Referral faxed to include face sheet, order, H&P, recent PT notes and med list. Agency will need face to face and discharge summary.

## 2023-04-27 NOTE — PROGRESS NOTES
Problem: Mobility Impaired (Adult and Pediatric)  Goal: *Acute Goals and Plan of Care (Insert Text)  Description: FUNCTIONAL STATUS PRIOR TO ADMISSION: pt lives alone in a 1 level apt; 4 SHREE w/ B HR; he had been ambulatory w/ a quad cane until about a month ago but due to coordination issues and falling pt had started using a rollator; he has a tub shower combo at home w/ a shower seat and suction grab bars; again due to the coordination issues and fear of falling pt has not been showering much lately and has been opting for a sink \"bird bath\"; pt was intermittently driving up until a few weeks ago but has expressed to his dtr that he thinks he needs to stop driving completely; both pt and dtr express that pt has been MI for B IADLs up to this point including cooking, cleaning and shopping; it was also noted that pt occasionally sleeps in his recliner vs the bed due to back pain    HOME SUPPORT PRIOR TO ADMISSION: dtr lives local and assists PRN but until recently pt has not required much help    Physical Therapy Goals  Initiated 4/19/2023  1. Patient will move from supine to sit and sit to supine in bed with independence within 7 day(s). 2.  Patient will transfer from bed to chair and chair to bed with SBA using the least restrictive device within 7 day(s). 3.  Patient will perform sit to stand with SBA within 7 day(s). 4.  Patient will ambulate with SBA for 300 feet with the least restrictive device within 7 day(s). 5.  Patient will ascend/descend 4 stairs with 1-2 handrail(s) with SBA within 7 day(s). Physical Therapy Goals  Weekly Progress Note 4/26/2023; goals reviewed and updated  1. Patient will move from supine to sit and sit to supine in bed with independence within 7 day(s). (Continue; currently SBA)  2.  Patient will transfer from bed to chair and chair to bed with SBA using the least restrictive device within 7 day(s). (Continue; currently CGA to SBA)  3.   Patient will perform sit to stand with SBA within 7 day(s). (Continue; currently CGA to SBA)  4. Patient will ambulate with SBA for 300 feet with the least restrictive device within 7 day(s). (Continue; currently up to 520' but CGA to SBA)  5. Patient will ascend/descend 4 stairs with 1-2 handrail(s) with SBA within 7 day(s). (Continue; currently up/down 10 steps w/ B HR and anywhere from SBA to min A)    Outcome: Progressing Towards Goal     Problem: Patient Education: Go to Patient Education Activity  Goal: Patient/Family Education  Outcome: Progressing Towards Goal  PHYSICAL THERAPY TREATMENT  Patient: Abraham Dominguez (37 y.o. male)  Date: 4/27/2023  Diagnosis: Physical deconditioning [R53.81]  Generalized weakness [R53.1] Encounter for rehabilitation      Precautions:    Chart, physical therapy assessment, plan of care and goals were reviewed. ASSESSMENT  Patient continues with skilled PT services and is progressing towards goals. Pt presented resting in bed; pleasant and cooperative. Once seated on EOB pt required cues for safety and technique when transferring to stand w/ 2WW for support. Pt was then able to walk around the nursing station and down to the rehab gym and back to his room prior to returning to his recliner chair for lunch. Pt was left eating lunch w/ both tray table and call bell in reach; chair alarm on. Vitals noted during session: O2 sats 97% and HR 71. Current Level of Function Impacting Discharge (mobility/balance): transfers CGA to SBA; balance w/ AD fair (-)    Other factors to consider for discharge: supportive dtr         PLAN :  Patient continues to benefit from skilled intervention to address the above impairments. Continue treatment per established plan of care. to address goals.     Recommendation for discharge: (in order for the patient to meet his/her long term goals)  Physical therapy at least 2 days/week in the home     This discharge recommendation:  Has been made in collaboration with the attending provider and/or case management    IF patient discharges home will need the following DME: patient owns DME required for discharge       SUBJECTIVE:   Patient stated I'm just laying here.     OBJECTIVE DATA SUMMARY:   Critical Behavior:  Neurologic State: Alert  Orientation Level: Oriented to person, Oriented to place, Disoriented to time  Cognition: Follows commands  Safety/Judgement: Fall prevention  Functional Mobility Training:  Bed Mobility:     Supine to Sit: Stand-by assistance     Scooting: Stand-by assistance        Transfers:  Sit to Stand: Stand-by assistance;Contact guard assistance  Stand to Sit: Stand-by assistance;Contact guard assistance        Bed to Chair: Stand-by assistance;Contact guard assistance                    Balance:  Sitting: Intact  Standing: Impaired; With support  Standing - Static: Constant support; Fair  Standing - Dynamic : Constant support;Fair;Occasional  Ambulation/Gait Training:  Distance (ft): 500 Feet (ft)  Assistive Device: Gait belt;Walker, rolling  Ambulation - Level of Assistance: Contact guard assistance;Stand-by assistance        Gait Abnormalities: Decreased step clearance; Path deviations        Base of Support: Widened     Speed/Padma: Pace decreased (<100 feet/min)  Step Length: Left shortened;Right shortened            Pain Rating:  denied    Activity Tolerance:   Good, Fair, and SpO2 stable on RA    After treatment patient left in no apparent distress:   Sitting in chair, Call bell within reach, and Bed / chair alarm activated    COMMUNICATION/COLLABORATION:   The patients plan of care was discussed with: Occupational therapist, Registered nurse, Physician, and Case management.      Gonzalo Rosario, LLOYD   Time Calculation: 14 mins

## 2023-04-28 VITALS
RESPIRATION RATE: 16 BRPM | HEART RATE: 85 BPM | HEIGHT: 72 IN | SYSTOLIC BLOOD PRESSURE: 141 MMHG | TEMPERATURE: 98.5 F | BODY MASS INDEX: 32.95 KG/M2 | OXYGEN SATURATION: 98 % | WEIGHT: 243.3 LBS | DIASTOLIC BLOOD PRESSURE: 92 MMHG

## 2023-04-28 PROCEDURE — 74011250636 HC RX REV CODE- 250/636: Performed by: INTERNAL MEDICINE

## 2023-04-28 PROCEDURE — 74011250637 HC RX REV CODE- 250/637: Performed by: INTERNAL MEDICINE

## 2023-04-28 PROCEDURE — 97112 NEUROMUSCULAR REEDUCATION: CPT

## 2023-04-28 PROCEDURE — 94760 N-INVAS EAR/PLS OXIMETRY 1: CPT

## 2023-04-28 PROCEDURE — 97535 SELF CARE MNGMENT TRAINING: CPT

## 2023-04-28 RX ORDER — QUETIAPINE FUMARATE 50 MG/1
50 TABLET, FILM COATED ORAL
Qty: 10 TABLET | Refills: 0 | Status: SHIPPED | OUTPATIENT
Start: 2023-04-28

## 2023-04-28 RX ORDER — TRAZODONE HYDROCHLORIDE 50 MG/1
50 TABLET ORAL
Qty: 10 TABLET | Refills: 0 | Status: SHIPPED | OUTPATIENT
Start: 2023-04-28

## 2023-04-28 RX ORDER — METOPROLOL SUCCINATE 25 MG/1
25 TABLET, EXTENDED RELEASE ORAL DAILY
Qty: 10 TABLET | Refills: 0 | Status: SHIPPED | OUTPATIENT
Start: 2023-04-29

## 2023-04-28 RX ORDER — QUETIAPINE FUMARATE 100 MG/1
100 TABLET, FILM COATED ORAL
Qty: 10 TABLET | Refills: 0 | Status: SHIPPED | OUTPATIENT
Start: 2023-04-28

## 2023-04-28 RX ORDER — SERTRALINE HYDROCHLORIDE 50 MG/1
50 TABLET, FILM COATED ORAL
Qty: 10 TABLET | Refills: 0 | Status: SHIPPED | OUTPATIENT
Start: 2023-04-28

## 2023-04-28 RX ADMIN — LISINOPRIL 5 MG: 5 TABLET ORAL at 09:41

## 2023-04-28 RX ADMIN — ASPIRIN 81 MG 81 MG: 81 TABLET ORAL at 09:40

## 2023-04-28 RX ADMIN — ENOXAPARIN SODIUM 30 MG: 100 INJECTION SUBCUTANEOUS at 09:40

## 2023-04-28 RX ADMIN — METOPROLOL SUCCINATE 25 MG: 25 TABLET, EXTENDED RELEASE ORAL at 09:41

## 2023-04-28 NOTE — PROGRESS NOTES
Problem: Falls - Risk of  Goal: *Absence of Falls  Description: Document Sundra Barneston Fall Risk and appropriate interventions in the flowsheet. 4/27/2023 2137 by Riley Lora RN  Outcome: Progressing Towards Goal  Note: Fall Risk Interventions:                             4/27/2023 2137 by Riley Lora RN  Outcome: Progressing Towards Goal  Note: Fall Risk Interventions:                                Problem: Patient Education: Go to Patient Education Activity  Goal: Patient/Family Education  4/27/2023 2137 by Riley Lora RN  Outcome: Progressing Towards Goal  4/27/2023 2137 by Riley Lora RN  Outcome: Progressing Towards Goal     Problem: Pressure Injury - Risk of  Goal: *Prevention of pressure injury  Description: Document Eddie Scale and appropriate interventions in the flowsheet.   4/27/2023 2137 by Riley Lora RN  Outcome: Progressing Towards Goal  Note: Pressure Injury Interventions:  Sensory Interventions: Maintain/enhance activity level    Moisture Interventions: Check for incontinence Q2 hours and as needed    Activity Interventions: Increase time out of bed    Mobility Interventions: PT/OT evaluation    Nutrition Interventions: Document food/fluid/supplement intake    Friction and Shear Interventions: Apply protective barrier, creams and emollients             4/27/2023 2137 by Riley Lora RN  Outcome: Progressing Towards Goal  Note: Pressure Injury Interventions:  Sensory Interventions: Maintain/enhance activity level    Moisture Interventions: Check for incontinence Q2 hours and as needed    Activity Interventions: Increase time out of bed    Mobility Interventions: PT/OT evaluation    Nutrition Interventions: Document food/fluid/supplement intake    Friction and Shear Interventions: Apply protective barrier, creams and emollients                Problem: Patient Education: Go to Patient Education Activity  Goal: Patient/Family Education  4/27/2023 2137 by Riley Lora RN  Outcome: Progressing Towards Goal  4/27/2023 2137 by Jigna Issa RN  Outcome: Progressing Towards Goal     Problem: Patient Education: Go to Patient Education Activity  Goal: Patient/Family Education  4/27/2023 2137 by Jigna Issa RN  Outcome: Progressing Towards Goal  4/27/2023 2137 by Jigna Issa RN  Outcome: Progressing Towards Goal     Problem: Patient Education: Go to Patient Education Activity  Goal: Patient/Family Education  4/27/2023 2137 by Jigna Issa RN  Outcome: Progressing Towards Goal  4/27/2023 2137 by Jigna Issa RN  Outcome: Progressing Towards Goal

## 2023-04-28 NOTE — PROGRESS NOTES
Care Management Interventions  PCP Verified by CM: Yes (Raeann Bucio )  Last Visit to PCP: 03/14/23  Palliative Care Criteria Met (RRAT>21 & CHF Dx)?: No (No MD order)  Mode of Transport at Discharge: Other (see comment) (POV)  Transition of Care Consult (CM Consult): Discharge Planning  MyChart Signup: No  Discharge Durable Medical Equipment: No  Physical Therapy Consult: Yes  Occupational Therapy Consult: Yes  Speech Therapy Consult: No  Support Systems: Child(liliam)  Confirm Follow Up Transport: Family  The Plan for Transition of Care is Related to the Following Treatment Goals : Swing bed--PT/OT  Discharge Location  Patient Expects to be Discharged to[de-identified] Home with home health ([address])      Patient is being discharged today from skilled care. He is going to his daughters home. He will receive home health through [facility]. PT/OT and nursing ordered. Patient's daughter Alta Ken was advised to obtain personal care aides. List of personal care aides was provided to Ms. Jon Sky. Patient/family aware to contact CM for any questions or concerns even after discharge. RUR: 9% LOW     Transition of Care (MARY GRACE) Plan: Home w/hh through [address] Transportation:       How is patient being transported at discharge? POV      When? Today      Is transport scheduled? N/a     Follow-up appointment and transportation:     PCP? Luis Alfredo Chahal MD      Who is transporting to the follow-up appointment? POV       Is transport for follow up appointment scheduled? N/a     Communication plan (with patient/family): Patient/family aware and agree with dc plan. Who is being called? Patient or Next of Kin? Responsible party? Patient's daughter Ms. Jon Sky        What number(s) is to be used? [phone]      What service provider is calling for Image Searcher? [facility]       When are they calling?  24--48 hours prior to ashly       Click here to [address] including selection of the Healthcare Decision Maker Relationship (ie \"Primary\")  [email or web address]

## 2023-04-28 NOTE — PROGRESS NOTES
0700  Assessment completed. Pt ambulated to the bathroom. 0800  Pt sitting in chair eating breakfast. Pt appears very pleasant and alert. Pt states that he slept well. 1400  Pt's daughter called to see when she would be picking pt up and left a VM.

## 2023-04-28 NOTE — PROGRESS NOTES
I have reviewed discharge instructions with the patient and caregiver. The patient and caregiver verbalized understanding. IV discontinued.

## 2023-04-28 NOTE — PROGRESS NOTES
Problem: Self Care Deficits Care Plan (Adult)  Goal: *Acute Goals and Plan of Care (Insert Text)  Description: Goal: Interventions  Outcome: Progressing Towards Goal  Note: Problem: Self Care Deficits Care Plan (Adult)  Goal: *Acute Goals and Plan of Care (Insert Text)  Outcome: Progressing Towards Goal  Goal: Interventions  Note: FUNCTIONAL STATUS PRIOR TO ADMISSION: Patient was modified independent using a walker and WB quad cane for functional mobility. HOME SUPPORT: pt lives alone and did not require support for  BADLs    Occupational Therapy Goals  Initiated 4/19/2023  1. Patient will perform grooming with supervision/set-up within 7 day(s). 2.  Patient will perform bathing with supervision/set-up within 7 day(s). 3.  Patient will perform lower body dressing with supervision/set-up within 7 day(s). 4.  Patient will perform toilet transfers with modified independence within 7 day(s). 5.  Patient will perform all aspects of toileting with modified independence within 7 day(s). 6.  Patient will participate in upper extremity therapeutic exercise/activities with supervision/set-up for 8 minutes within 7 day(s). 7.  Patient will utilize energy conservation techniques during functional activities with verbal cues within 7   Outcome: Progressing Towards Goal   OCCUPATIONAL THERAPY TREATMENT  Patient: Arianna Mariscal (75 y.o. male)  Date: 4/28/2023  Diagnosis: Physical deconditioning [R53.81]  Generalized weakness [R53.1] Encounter for rehabilitation      Precautions:    Chart, occupational therapy assessment, plan of care, and goals were reviewed. ASSESSMENT  Patient continues with skilled OT services and is progressing towards goals. Pt received sitting up in chair finishing breakfast. Pt alert, oriented x4,looking forward to going home today. Pt offered a shower prior to leaving but reported he would do a sponge bath as his chair and [address] at home make it easier.  Pt donned shoes and socks with Lisa with practice tying shoes(unable with legs crossed or leaning over) . Pt educated on elastic shoes laces and pt agreed but reported he had slip- ons that are easier. Pt demonstrated fair-good B manual dexterity with using utensils for his breakfast. Pt donned gait belt, lined himself up with RW and stood from chair with SBA. Ambulated to sink for standing ADLs. Pt stood x 3 mins w/out UE support. Then after intermittent balance correction with elbow to wall or hand to wall or walker x 4 more mins. Completed UB bathing and grooming with SBA for standing balance. Returned to chair to sit x 2 mins and then completed toileting with CGA to manage clothing and standing up off the toilet. Pt needs minimal  safety cues this session. Pt able to state what he needs to do before standing or sitting down. .    Current Level of Function Impacting Discharge (ADLs): Mod- MaxA lower body to SBA- supervision UB    Other factors to consider for discharge: decreased standing balance, some impulsivity         PLAN :  Patient continues to benefit from skilled intervention to address the above impairments. Continue treatment per established plan of care to address goals. Recommend next OT session: standing adls in his daughter home    Recommendation for discharge: (in order for the patient to meet his/her long term goals)  Occupational therapy at least 2 days/week in the home     This discharge recommendation:  Has been made in collaboration with the attending provider and/or case management    IF patient discharges home will need the following DME: patient owns DME required for discharge       SUBJECTIVE:   Patient stated I'm feeling so much better.     OBJECTIVE DATA SUMMARY:   Cognitive/Behavioral Status:  Neurologic State: Alert  Orientation Level: Oriented to person;Oriented to place;Oriented to situation;Oriented to time  Cognition: Follows commands     Safety/Judgement: Fall prevention    Functional Mobility and Transfers for ADLs:  Bed Mobility:  Scooting: Supervision;Stand-by assistance    Transfers:  Sit to Stand: Stand-by assistance  Functional Transfers  Bathroom Mobility: Stand-by assistance  Toilet Transfer : Stand-by assistance;Contact guard assistance  Bed to Chair: Stand-by assistance    Balance:  Sitting: Intact  Standing: Impaired;Pull to stand  Standing - Static: Fair;Constant support;Occasional (stood at sink w/o UE x 3mins and then another 4mins with intermittent UE support)  Standing - Dynamic : Constant support;Fair;Occasional    ADL Intervention:       Grooming  Grooming Assistance: Stand-by assistance (SBA for standing balance)  Position Performed: Standing  Washing Face: Stand-by assistance  Washing Hands: Stand-by assistance  Brushing/Combing Hair: Stand-by assistance    Upper Body Bathing  Bathing Assistance: Stand-by assistance;Contact guard assistance  Position Performed: Standing;Seated in chair    Type of Bath: Basin/Soap/Water;Partial    Lower Body Bathing  Bathing Assistance: Minimum assistance; Moderate assistance  Position Performed: Seated in chair    Lower Body Dressing Assistance  Protective Undergarmet: Minimum assistance  Pants With Elastic Waist: Contact guard assistance  Socks: Minimum assistance; Moderate assistance  Shoes with Cloth Laces: Minimum assistance (practiced tying shoes, difficult leaning forward or with legs crossed, educated on elastic laces/pt says he prefers slipons)    Toileting  Toileting Assistance: Contact guard assistance;Stand-by assistance  Bladder Hygiene: Stand-by assistance  Cues: Verbal cues provided    Cognitive Retraining  Safety/Judgement: Fall prevention    Therapeutic Exercises:   B manual dexterity exercises with functional practice with tying and donning/duffing gait belt clasp    Pain:  None reported    Activity Tolerance:   Good, Fair, and requires rest breaks    After treatment patient left in no apparent distress:   Sitting in chair, Call bell within reach, and Bed / chair alarm activated    COMMUNICATION/COLLABORATION:   The patients plan of care was discussed with: Registered nurse, Case management, and Rehabilitation technician.      Jose Martino OT  Time Calculation: 53 mins

## 2023-04-28 NOTE — PROGRESS NOTES
Problem: Falls - Risk of  Goal: *Absence of Falls  Description: Document Marivel Mckenzie Fall Risk and appropriate interventions in the flowsheet. Outcome: Progressing Towards Goal  Note: Fall Risk Interventions:                                Problem: Patient Education: Go to Patient Education Activity  Goal: Patient/Family Education  Outcome: Progressing Towards Goal     Problem: Pressure Injury - Risk of  Goal: *Prevention of pressure injury  Description: Document Eddie Scale and appropriate interventions in the flowsheet.   Outcome: Progressing Towards Goal  Note: Pressure Injury Interventions:  Sensory Interventions: Maintain/enhance activity level    Moisture Interventions: Check for incontinence Q2 hours and as needed    Activity Interventions: Increase time out of bed    Mobility Interventions: PT/OT evaluation    Nutrition Interventions: Document food/fluid/supplement intake    Friction and Shear Interventions: Apply protective barrier, creams and emollients                Problem: Patient Education: Go to Patient Education Activity  Goal: Patient/Family Education  Outcome: Progressing Towards Goal     Problem: Patient Education: Go to Patient Education Activity  Goal: Patient/Family Education  Outcome: Progressing Towards Goal     Problem: Patient Education: Go to Patient Education Activity  Goal: Patient/Family Education  Outcome: Progressing Towards Goal

## 2023-04-28 NOTE — DISCHARGE SUMMARY
[facility]  Hospitalist Discharge Summary    Patient ID:  Marisela Acosta  [id number]  82 y.o.  1940    PCP on record: Sanjay Casey MD    Admit date: 4/18/2023  Discharge date and time: 4/28/2023     Admitting diagnosis:  Recurrent falls with severe debility. Discharge diagnosis:  Severe debility; multifactorial along with with underlying advanced dementia. History of hypertension. History of type 2 diabetes mellitus. Vascular dementia with delirium. Dyslipidemia. Hospital course/history of present illness:  Patient is an 82-year-old  male with past medical history significant for severe dementia, history of CVA with severe debility with multiple falls was admitted to [facility] on 04/19/2023 for rehabilitation assessment and management. Patient's psych medications were adjusted and the patient was seen by the psychiatrist,  and case management working on the need for rehabilitation and did not qualify for insurance issues, otherwise remained stable. Daughter decided to take the patient to her home and to continue with home PT which was arranged by the social workers. Patient seen and examined by me on discharge day. Pertinent Findings:  Visit Vitals  BP (!) 141/92 (BP 1 Location: Right upper arm, BP Patient Position: Sitting)   Pulse 85   Temp 98.5 °F (36.9 °C)   Resp 16   Ht 6' (1.829 m)   Wt 110.4 kg (243 lb 4.8 oz)   SpO2 98%   BMI 33.00 kg/m²     Gen:    Not in distress  Chest: Nonlabored respiration, Clear lungs  CVS:   Regular rhythm. No edema  Abd:  Soft, not distended, not tender  Neuro:  Alert, nonfocal, weak  ____________________________________________________________________  DISCHARGE MEDICATIONS:   Current Discharge Medication List        START taking these medications    Details   !! QUEtiapine (SEROquel) 50 mg tablet Take 1 Tablet by mouth every six (6) hours as needed (Agitation/Hallucinations).   Qty: 10 Tablet, Refills: 0  Start date: 4/28/2023      metoprolol succinate (TOPROL-XL) 25 mg XL tablet Take 1 Tablet by mouth daily. Qty: 10 Tablet, Refills: 0  Start date: 4/29/2023      !! QUEtiapine (SEROquel) 100 mg tablet Take 1 Tablet by mouth Daily (before dinner). Qty: 10 Tablet, Refills: 0  Start date: 4/28/2023      traZODone (DESYREL) 50 mg tablet Take 1 Tablet by mouth nightly as needed for Sleep. Qty: 10 Tablet, Refills: 0  Start date: 4/28/2023       !! - Potential duplicate medications found. Please discuss with provider. CONTINUE these medications which have CHANGED    Details   sertraline (ZOLOFT) 50 mg tablet Take 1 Tablet by mouth nightly. Indications: anxiousness associated with depression  Qty: 10 Tablet, Refills: 0  Start date: 4/28/2023           CONTINUE these medications which have NOT CHANGED    Details   metFORMIN (GLUCOPHAGE) 500 mg tablet Take 1/2 (one-half) tablet at dinner  Qty: 90 Tablet, Refills: 0    Associated Diagnoses: Type 2 diabetes mellitus treated without insulin ([facility])      lovastatin (MEVACOR) 40 mg tablet Take half a tablet at night  Qty: 90 Tablet, Refills: 0      gabapentin (NEURONTIN) 300 mg capsule TAKE 1 CAPSULE BY MOUTH TWICE DAILY MAX  DAILY  AMOUNT  600MG  Indications: neuropathic pain  Qty: 180 Capsule, Refills: 0    Associated Diagnoses: Diabetic peripheral neuropathy associated with type 2 diabetes mellitus (HCC)      hydroCHLOROthiazide (HYDRODIURIL) 12.5 mg tablet Take 1 Tablet by mouth daily. Qty: 90 Tablet, Refills: 0    Associated Diagnoses: Primary hypertension      polyethylene glycol (MIRALAX) 17 gram packet Take 1 Package by mouth as needed.            STOP taking these medications       buPROPion XL (WELLBUTRIN XL) 300 mg XL tablet Comments:   Reason for Stopping:         metoprolol tartrate (LOPRESSOR) 25 mg tablet Comments:   Reason for Stopping:             Discharge instructions:  Patient family was instructed to follow-up with the PCP and patient's psychiatrist within a week for postdischarge follow-up  Condition at Discharge:  Stable  _____________________________________________________________________  Follow up with:   PCP : Franco Anderson MD  Follow-up Information       Follow up With Specialties Details Why [address] [place] [address] Follow up THEY WILL SEE YOU WITHIN 48 HOURS. CALL THEM FOR ANY QUESTIONS OR CONCERNS. [facility]    Franco Anderson, [address] [address]  [phone]            Total time in minutes spent coordinating this discharge (includes going over instructions, follow-up, prescriptions, and preparing report for sign off to her PCP) :35 minutes    Signed:  Antonette Schuler MD  [facility] Hospitalist  [phone]  .

## 2023-04-28 NOTE — SWING BED INTERDISCIPLINARY CARE PLAN DIETARY NOTE
Dietary:    Diet:  regular 4 carb choices, low fat/low chol/high fiber/JASON       Admit wt.: 252#  Current weight/date: 243# 4/23  Type/Amount of Supplement taken: none  Significant wt. Loss: No  Significant wt.  Gain: NO  Intake: 50-75% Fair    Patient Vitals for the past 168 hrs:   % Diet Eaten   04/27/23 0917 51 - 75%   04/26/23 1700 1 - 25%   04/26/23 1200 1 - 25%   04/26/23 0800 76 - 100%   04/25/23 1251 1 - 25%   04/25/23 0800 1 - 25%   04/24/23 1719 26 - 50%   04/24/23 1215 51 - 75%   04/23/23 1800 76 - 100%   04/23/23 1200 0%   04/22/23 1829 51 - 75%   04/22/23 1212 26 - 50%   04/22/23 0815 51 - 75%         Upon review of the patients current care plan, the following issues, problems, or needs remain: none    Manny Gao RD

## 2023-05-07 RX ORDER — TRAZODONE HYDROCHLORIDE 50 MG/1
1 TABLET ORAL NIGHTLY PRN
COMMUNITY
Start: 2023-04-28 | End: 2023-06-15 | Stop reason: CLARIF

## 2023-05-07 RX ORDER — QUETIAPINE FUMARATE 100 MG/1
100 TABLET, FILM COATED ORAL
COMMUNITY
Start: 2023-04-28 | End: 2023-05-08 | Stop reason: SDUPTHER

## 2023-05-07 RX ORDER — QUETIAPINE FUMARATE 50 MG/1
50 TABLET, FILM COATED ORAL EVERY 6 HOURS PRN
COMMUNITY
Start: 2023-04-28 | End: 2023-06-15 | Stop reason: CLARIF

## 2023-05-08 ENCOUNTER — TELEPHONE (OUTPATIENT)
Dept: FAMILY MEDICINE CLINIC | Age: 83
End: 2023-05-08

## 2023-05-08 RX ORDER — QUETIAPINE FUMARATE 50 MG/1
50 TABLET, FILM COATED ORAL EVERY 6 HOURS PRN
Qty: 60 TABLET | Status: CANCELLED | OUTPATIENT
Start: 2023-05-08

## 2023-05-08 RX ORDER — QUETIAPINE FUMARATE 100 MG/1
100 TABLET, FILM COATED ORAL 2 TIMES DAILY
Qty: 30 TABLET | Refills: 0 | Status: SHIPPED | OUTPATIENT
Start: 2023-05-08

## 2023-05-08 RX ORDER — SERTRALINE HYDROCHLORIDE 100 MG/1
50 TABLET, FILM COATED ORAL DAILY
Qty: 15 TABLET | Refills: 0 | Status: SHIPPED | OUTPATIENT
Start: 2023-05-08 | End: 2023-05-11 | Stop reason: ALTCHOICE

## 2023-05-08 NOTE — TELEPHONE ENCOUNTER
I am not clear how pt is taking the medication  He should have a F/U of the hospital stay with me asap

## 2023-05-08 NOTE — TELEPHONE ENCOUNTER
I have talked to the patient's daughter. I have sent a refill request for Zoloft at the adjusted dose.

## 2023-05-08 NOTE — TELEPHONE ENCOUNTER
I have called the patient to clarify the medication refills requested. Mainly the Sertraline 50 mg.   This is not in his list.

## 2023-05-08 NOTE — TELEPHONE ENCOUNTER
Sertraline 50 mg once daily. metoprolol 25 mg 1 once daily. Quetiapine 100 mg one at night.     Please send to [facility]

## 2023-05-08 NOTE — TELEPHONE ENCOUNTER
Patient's daughter Declan Fernández calls back and confirms that the Zoloft was decreased to 50 mg by Dr Jorge Herring while he was in the hospital.

## 2023-05-11 ENCOUNTER — OFFICE VISIT (OUTPATIENT)
Dept: FAMILY MEDICINE CLINIC | Age: 83
End: 2023-05-11

## 2023-05-11 VITALS
HEART RATE: 60 BPM | RESPIRATION RATE: 10 BRPM | WEIGHT: 233 LBS | DIASTOLIC BLOOD PRESSURE: 60 MMHG | TEMPERATURE: 98 F | SYSTOLIC BLOOD PRESSURE: 90 MMHG | HEIGHT: 72 IN | BODY MASS INDEX: 31.56 KG/M2 | OXYGEN SATURATION: 94 %

## 2023-05-11 DIAGNOSIS — G62.9 NEUROPATHY: ICD-10-CM

## 2023-05-11 DIAGNOSIS — I10 ESSENTIAL (PRIMARY) HYPERTENSION: ICD-10-CM

## 2023-05-11 DIAGNOSIS — E11.9 TYPE 2 DIABETES MELLITUS WITHOUT COMPLICATION, WITHOUT LONG-TERM CURRENT USE OF INSULIN (HCC): ICD-10-CM

## 2023-05-11 DIAGNOSIS — F32.A DEPRESSION, UNSPECIFIED DEPRESSION TYPE: ICD-10-CM

## 2023-05-11 DIAGNOSIS — Z09 HOSPITAL DISCHARGE FOLLOW-UP: Primary | ICD-10-CM

## 2023-05-11 DIAGNOSIS — Z91.81 AT HIGH RISK FOR FALLS: ICD-10-CM

## 2023-05-11 DIAGNOSIS — E78.5 HYPERLIPIDEMIA, UNSPECIFIED HYPERLIPIDEMIA TYPE: ICD-10-CM

## 2023-05-11 RX ORDER — METOPROLOL SUCCINATE 25 MG/1
25 TABLET, EXTENDED RELEASE ORAL DAILY
COMMUNITY
Start: 2023-04-28

## 2023-05-11 RX ORDER — GABAPENTIN 300 MG/1
CAPSULE ORAL
Qty: 60 CAPSULE | Refills: 0
Start: 2023-05-11 | End: 2023-06-10

## 2023-05-11 ASSESSMENT — PATIENT HEALTH QUESTIONNAIRE - PHQ9
1. LITTLE INTEREST OR PLEASURE IN DOING THINGS: 1
SUM OF ALL RESPONSES TO PHQ QUESTIONS 1-9: 2
2. FEELING DOWN, DEPRESSED OR HOPELESS: 1
SUM OF ALL RESPONSES TO PHQ9 QUESTIONS 1 & 2: 2
SUM OF ALL RESPONSES TO PHQ QUESTIONS 1-9: 2

## 2023-05-11 ASSESSMENT — ENCOUNTER SYMPTOMS: ABDOMINAL PAIN: 0

## 2023-05-11 NOTE — PROGRESS NOTES
Elieser Mccann is a 82 y.o. male who presents to the office today with the following:  Chief Complaint   Patient presents with    Follow-Up from Hospital     fall       HPI  Pt here for hospital follow up  Pt was admitted 4/11/23  Had falls 2 d prior to calling rescue sqad, no LOC but had vivid hallucinations and delirium  Was very unsteady on feet and diagnosed with TIA's    No seizures  Was in rehab for almost 3 w at [facility]  Released 4/28/23    Was started on Toprol XL 25 mg qd  On Seroquel 100 mg at night now  And 50 mg prn during the day  And taking Trazodone 50 mg prn  Sertraline 50 mg qd  Gabapentin 300 mg bid    BP low  today  On Metoprolol XL 25, HCTZ 12.5, Lisinopril 10  No dizziness  Feeling better now  When coming home was in bad shape per daughter  Daughter thought he needed to go to NH  Was not having meals and not taking meds regularly  Since daughter monitoring it better, 'snapped back'  Can not do it on his own per daughter  Nenita Corbin in with daughter now  Feeding him and monitoring meds and better    Still cognitive change per daughter  Has PT/OT ordered but needs to call to get it set up  Needed cues, better now per daughter    Daughter concerned re driving    Still on Metformin 500  And Mevacor 40 half a tablet  Labs were good on review    Review of Systems   Gastrointestinal:  Negative for abdominal pain. Neurological:  Negative for dizziness and headaches. See HPI.     Past Medical History:   Diagnosis Date    Depression     Hypercholesterolemia     Insomnia     Obesity     Peripheral neuropathy     PVC's (premature ventricular contractions)     Type 2 diabetes mellitus treated without insulin [facility])        Past Surgical History:   Procedure Laterality Date    COLONOSCOPY  2003, 2010    [facility] UNLISTED PROCEDURE CARDIAC SURGERY  9/2010    catheterization       Allergies   Allergen Reactions    Wellbutrin [Bupropion] Hallucinations       Current Outpatient Medications   Medication Sig Dispense Refill

## 2023-06-02 RX ORDER — QUETIAPINE FUMARATE 100 MG/1
100 TABLET, FILM COATED ORAL DAILY
Qty: 30 TABLET | Refills: 0 | Status: SHIPPED | OUTPATIENT
Start: 2023-06-02

## 2023-06-25 RX ORDER — QUETIAPINE FUMARATE 100 MG/1
100 TABLET, FILM COATED ORAL DAILY
Qty: 30 TABLET | Refills: 3 | Status: SHIPPED | OUTPATIENT
Start: 2023-06-25

## 2023-06-25 RX ORDER — LISINOPRIL 10 MG/1
10 TABLET ORAL DAILY
Qty: 30 TABLET | Refills: 3 | Status: SHIPPED | OUTPATIENT
Start: 2023-06-25

## 2023-06-25 RX ORDER — METOPROLOL SUCCINATE 25 MG/1
25 TABLET, EXTENDED RELEASE ORAL DAILY
Qty: 30 TABLET | Refills: 3 | Status: SHIPPED | OUTPATIENT
Start: 2023-06-25

## 2023-08-14 DIAGNOSIS — G62.9 NEUROPATHY: ICD-10-CM

## 2023-08-15 RX ORDER — GABAPENTIN 300 MG/1
CAPSULE ORAL
Qty: 60 CAPSULE | Refills: 0 | Status: SHIPPED | OUTPATIENT
Start: 2023-08-15 | End: 2023-09-18

## 2023-09-05 DIAGNOSIS — G62.9 NEUROPATHY: ICD-10-CM

## 2023-09-05 RX ORDER — GABAPENTIN 300 MG/1
CAPSULE ORAL
Qty: 60 CAPSULE | Refills: 0 | OUTPATIENT
Start: 2023-09-05

## 2023-09-18 ENCOUNTER — OFFICE VISIT (OUTPATIENT)
Dept: FAMILY MEDICINE CLINIC | Age: 83
End: 2023-09-18
Payer: MEDICARE

## 2023-09-18 VITALS
OXYGEN SATURATION: 96 % | SYSTOLIC BLOOD PRESSURE: 119 MMHG | TEMPERATURE: 97.9 F | WEIGHT: 235 LBS | BODY MASS INDEX: 31.83 KG/M2 | RESPIRATION RATE: 12 BRPM | HEIGHT: 72 IN | DIASTOLIC BLOOD PRESSURE: 52 MMHG | HEART RATE: 41 BPM

## 2023-09-18 DIAGNOSIS — I10 HYPERTENSION, UNSPECIFIED TYPE: ICD-10-CM

## 2023-09-18 DIAGNOSIS — Z00.00 MEDICARE ANNUAL WELLNESS VISIT, SUBSEQUENT: Primary | ICD-10-CM

## 2023-09-18 DIAGNOSIS — E11.9 TYPE 2 DIABETES MELLITUS WITHOUT COMPLICATION, WITHOUT LONG-TERM CURRENT USE OF INSULIN (HCC): ICD-10-CM

## 2023-09-18 DIAGNOSIS — F41.9 ANXIETY: ICD-10-CM

## 2023-09-18 DIAGNOSIS — G62.9 NEUROPATHY: ICD-10-CM

## 2023-09-18 PROCEDURE — 3074F SYST BP LT 130 MM HG: CPT | Performed by: FAMILY MEDICINE

## 2023-09-18 PROCEDURE — 99213 OFFICE O/P EST LOW 20 MIN: CPT | Performed by: FAMILY MEDICINE

## 2023-09-18 PROCEDURE — 3044F HG A1C LEVEL LT 7.0%: CPT | Performed by: FAMILY MEDICINE

## 2023-09-18 PROCEDURE — 1123F ACP DISCUSS/DSCN MKR DOCD: CPT | Performed by: FAMILY MEDICINE

## 2023-09-18 PROCEDURE — G0439 PPPS, SUBSEQ VISIT: HCPCS | Performed by: FAMILY MEDICINE

## 2023-09-18 PROCEDURE — 3078F DIAST BP <80 MM HG: CPT | Performed by: FAMILY MEDICINE

## 2023-09-18 RX ORDER — GABAPENTIN 300 MG/1
CAPSULE ORAL
Qty: 180 CAPSULE | Refills: 0 | Status: SHIPPED | OUTPATIENT
Start: 2023-09-18 | End: 2023-10-22

## 2023-09-18 RX ORDER — QUETIAPINE FUMARATE 50 MG/1
TABLET, FILM COATED ORAL
Qty: 30 TABLET | Refills: 0 | Status: SHIPPED | OUTPATIENT
Start: 2023-09-18

## 2023-09-18 ASSESSMENT — PATIENT HEALTH QUESTIONNAIRE - PHQ9
1. LITTLE INTEREST OR PLEASURE IN DOING THINGS: 2
5. POOR APPETITE OR OVEREATING: 0
9. THOUGHTS THAT YOU WOULD BE BETTER OFF DEAD, OR OF HURTING YOURSELF: 1
SUM OF ALL RESPONSES TO PHQ QUESTIONS 1-9: 10
8. MOVING OR SPEAKING SO SLOWLY THAT OTHER PEOPLE COULD HAVE NOTICED. OR THE OPPOSITE, BEING SO FIGETY OR RESTLESS THAT YOU HAVE BEEN MOVING AROUND A LOT MORE THAN USUAL: 1
3. TROUBLE FALLING OR STAYING ASLEEP: 1
SUM OF ALL RESPONSES TO PHQ QUESTIONS 1-9: 10
SUM OF ALL RESPONSES TO PHQ QUESTIONS 1-9: 9
7. TROUBLE CONCENTRATING ON THINGS, SUCH AS READING THE NEWSPAPER OR WATCHING TELEVISION: 1
4. FEELING TIRED OR HAVING LITTLE ENERGY: 1
2. FEELING DOWN, DEPRESSED OR HOPELESS: 2
SUM OF ALL RESPONSES TO PHQ9 QUESTIONS 1 & 2: 4
6. FEELING BAD ABOUT YOURSELF - OR THAT YOU ARE A FAILURE OR HAVE LET YOURSELF OR YOUR FAMILY DOWN: 1
SUM OF ALL RESPONSES TO PHQ QUESTIONS 1-9: 10

## 2023-09-18 ASSESSMENT — COLUMBIA-SUICIDE SEVERITY RATING SCALE - C-SSRS
1. WITHIN THE PAST MONTH, HAVE YOU WISHED YOU WERE DEAD OR WISHED YOU COULD GO TO SLEEP AND NOT WAKE UP?: NO
6. HAVE YOU EVER DONE ANYTHING, STARTED TO DO ANYTHING, OR PREPARED TO DO ANYTHING TO END YOUR LIFE?: NO
2. HAVE YOU ACTUALLY HAD ANY THOUGHTS OF KILLING YOURSELF?: NO

## 2023-09-18 ASSESSMENT — LIFESTYLE VARIABLES
HOW OFTEN DO YOU HAVE A DRINK CONTAINING ALCOHOL: NEVER
HOW MANY STANDARD DRINKS CONTAINING ALCOHOL DO YOU HAVE ON A TYPICAL DAY: PATIENT DOES NOT DRINK

## 2023-09-18 ASSESSMENT — ENCOUNTER SYMPTOMS
COUGH: 0
ABDOMINAL PAIN: 0
GASTROINTESTINAL NEGATIVE: 1

## 2023-10-19 ENCOUNTER — OFFICE VISIT (OUTPATIENT)
Dept: FAMILY MEDICINE CLINIC | Age: 83
End: 2023-10-19
Payer: MEDICARE

## 2023-10-19 VITALS
SYSTOLIC BLOOD PRESSURE: 97 MMHG | BODY MASS INDEX: 32.23 KG/M2 | DIASTOLIC BLOOD PRESSURE: 61 MMHG | WEIGHT: 238 LBS | RESPIRATION RATE: 12 BRPM | OXYGEN SATURATION: 97 % | TEMPERATURE: 98.2 F | HEIGHT: 72 IN

## 2023-10-19 DIAGNOSIS — G62.9 NEUROPATHY: ICD-10-CM

## 2023-10-19 DIAGNOSIS — E11.9 TYPE 2 DIABETES MELLITUS WITHOUT COMPLICATION, WITHOUT LONG-TERM CURRENT USE OF INSULIN (HCC): ICD-10-CM

## 2023-10-19 DIAGNOSIS — F41.9 ANXIETY: ICD-10-CM

## 2023-10-19 DIAGNOSIS — I10 HYPERTENSION, UNSPECIFIED TYPE: ICD-10-CM

## 2023-10-19 LAB
ANION GAP SERPL CALC-SCNC: 3 MMOL/L (ref 5–15)
BASOPHILS # BLD: 0 K/UL (ref 0–0.1)
BASOPHILS NFR BLD: 1 % (ref 0–1)
BUN SERPL-MCNC: 28 MG/DL (ref 6–20)
BUN/CREAT SERPL: 21 (ref 12–20)
CALCIUM SERPL-MCNC: 8.8 MG/DL (ref 8.5–10.1)
CHLORIDE SERPL-SCNC: 107 MMOL/L (ref 97–108)
CO2 SERPL-SCNC: 30 MMOL/L (ref 21–32)
CREAT SERPL-MCNC: 1.35 MG/DL (ref 0.7–1.3)
DIFFERENTIAL METHOD BLD: ABNORMAL
EOSINOPHIL # BLD: 0.2 K/UL (ref 0–0.4)
EOSINOPHIL NFR BLD: 3 % (ref 0–7)
ERYTHROCYTE [DISTWIDTH] IN BLOOD BY AUTOMATED COUNT: 13.4 % (ref 11.5–14.5)
EST. AVERAGE GLUCOSE BLD GHB EST-MCNC: 114 MG/DL
GLUCOSE SERPL-MCNC: 105 MG/DL (ref 65–100)
HBA1C MFR BLD: 5.6 % (ref 4–5.6)
HCT VFR BLD AUTO: 40.7 % (ref 36.6–50.3)
HGB BLD-MCNC: 12.9 G/DL (ref 12.1–17)
IMM GRANULOCYTES # BLD AUTO: 0 K/UL (ref 0–0.04)
IMM GRANULOCYTES NFR BLD AUTO: 0 % (ref 0–0.5)
LYMPHOCYTES # BLD: 2.4 K/UL (ref 0.8–3.5)
LYMPHOCYTES NFR BLD: 33 % (ref 12–49)
MCH RBC QN AUTO: 32 PG (ref 26–34)
MCHC RBC AUTO-ENTMCNC: 31.7 G/DL (ref 30–36.5)
MCV RBC AUTO: 101 FL (ref 80–99)
MONOCYTES # BLD: 0.5 K/UL (ref 0–1)
MONOCYTES NFR BLD: 7 % (ref 5–13)
NEUTS SEG # BLD: 4.1 K/UL (ref 1.8–8)
NEUTS SEG NFR BLD: 56 % (ref 32–75)
NRBC # BLD: 0 K/UL (ref 0–0.01)
NRBC BLD-RTO: 0 PER 100 WBC
PLATELET # BLD AUTO: 224 K/UL (ref 150–400)
PMV BLD AUTO: 10.6 FL (ref 8.9–12.9)
POTASSIUM SERPL-SCNC: 4.5 MMOL/L (ref 3.5–5.1)
RBC # BLD AUTO: 4.03 M/UL (ref 4.1–5.7)
SODIUM SERPL-SCNC: 140 MMOL/L (ref 136–145)
WBC # BLD AUTO: 7.2 K/UL (ref 4.1–11.1)

## 2023-10-19 PROCEDURE — 3074F SYST BP LT 130 MM HG: CPT | Performed by: FAMILY MEDICINE

## 2023-10-19 PROCEDURE — 3044F HG A1C LEVEL LT 7.0%: CPT | Performed by: FAMILY MEDICINE

## 2023-10-19 PROCEDURE — 1123F ACP DISCUSS/DSCN MKR DOCD: CPT | Performed by: FAMILY MEDICINE

## 2023-10-19 PROCEDURE — 3078F DIAST BP <80 MM HG: CPT | Performed by: FAMILY MEDICINE

## 2023-10-19 PROCEDURE — 99214 OFFICE O/P EST MOD 30 MIN: CPT | Performed by: FAMILY MEDICINE

## 2023-10-19 RX ORDER — GABAPENTIN 300 MG/1
CAPSULE ORAL
Qty: 180 CAPSULE | Refills: 0 | Status: SHIPPED | OUTPATIENT
Start: 2023-10-19 | End: 2023-11-22

## 2023-10-19 RX ORDER — QUETIAPINE FUMARATE 50 MG/1
TABLET, FILM COATED ORAL
Qty: 90 TABLET | Refills: 0 | Status: SHIPPED | OUTPATIENT
Start: 2023-10-19

## 2023-10-19 ASSESSMENT — PATIENT HEALTH QUESTIONNAIRE - PHQ9
5. POOR APPETITE OR OVEREATING: 0
6. FEELING BAD ABOUT YOURSELF - OR THAT YOU ARE A FAILURE OR HAVE LET YOURSELF OR YOUR FAMILY DOWN: 2
2. FEELING DOWN, DEPRESSED OR HOPELESS: 2
SUM OF ALL RESPONSES TO PHQ QUESTIONS 1-9: 12
9. THOUGHTS THAT YOU WOULD BE BETTER OFF DEAD, OR OF HURTING YOURSELF: 2
3. TROUBLE FALLING OR STAYING ASLEEP: 0
1. LITTLE INTEREST OR PLEASURE IN DOING THINGS: 2
8. MOVING OR SPEAKING SO SLOWLY THAT OTHER PEOPLE COULD HAVE NOTICED. OR THE OPPOSITE, BEING SO FIGETY OR RESTLESS THAT YOU HAVE BEEN MOVING AROUND A LOT MORE THAN USUAL: 1
SUM OF ALL RESPONSES TO PHQ9 QUESTIONS 1 & 2: 4
SUM OF ALL RESPONSES TO PHQ QUESTIONS 1-9: 10
4. FEELING TIRED OR HAVING LITTLE ENERGY: 2
SUM OF ALL RESPONSES TO PHQ QUESTIONS 1-9: 12
SUM OF ALL RESPONSES TO PHQ QUESTIONS 1-9: 12
7. TROUBLE CONCENTRATING ON THINGS, SUCH AS READING THE NEWSPAPER OR WATCHING TELEVISION: 1
10. IF YOU CHECKED OFF ANY PROBLEMS, HOW DIFFICULT HAVE THESE PROBLEMS MADE IT FOR YOU TO DO YOUR WORK, TAKE CARE OF THINGS AT HOME, OR GET ALONG WITH OTHER PEOPLE: 1

## 2023-10-19 ASSESSMENT — COLUMBIA-SUICIDE SEVERITY RATING SCALE - C-SSRS
3. HAVE YOU BEEN THINKING ABOUT HOW YOU MIGHT KILL YOURSELF?: NO
2. HAVE YOU ACTUALLY HAD ANY THOUGHTS OF KILLING YOURSELF?: NO
1. WITHIN THE PAST MONTH, HAVE YOU WISHED YOU WERE DEAD OR WISHED YOU COULD GO TO SLEEP AND NOT WAKE UP?: NO

## 2023-11-02 RX ORDER — LISINOPRIL 10 MG/1
10 TABLET ORAL DAILY
Qty: 90 TABLET | Refills: 0 | Status: SHIPPED | OUTPATIENT
Start: 2023-11-02

## 2023-11-02 RX ORDER — METOPROLOL SUCCINATE 25 MG/1
25 TABLET, EXTENDED RELEASE ORAL DAILY
Qty: 90 TABLET | Refills: 0 | Status: SHIPPED | OUTPATIENT
Start: 2023-11-02

## 2023-11-07 DIAGNOSIS — R05.1 ACUTE COUGH: Primary | ICD-10-CM

## 2023-11-07 RX ORDER — AZITHROMYCIN 250 MG/1
250 TABLET, FILM COATED ORAL SEE ADMIN INSTRUCTIONS
Qty: 6 TABLET | Refills: 0 | Status: SHIPPED | OUTPATIENT
Start: 2023-11-07 | End: 2023-11-12

## 2023-11-08 DIAGNOSIS — R05.1 ACUTE COUGH: ICD-10-CM

## 2024-02-12 RX ORDER — METOPROLOL SUCCINATE 25 MG/1
25 TABLET, EXTENDED RELEASE ORAL DAILY
Qty: 90 TABLET | Refills: 0 | Status: SHIPPED | OUTPATIENT
Start: 2024-02-12

## 2024-02-12 NOTE — TELEPHONE ENCOUNTER
Requested Prescriptions     Pending Prescriptions Disp Refills    metoprolol succinate (TOPROL XL) 25 MG extended release tablet [Pharmacy Med Name: METOPROLOL ER SUCCINATE 25MG TABS] 90 tablet 0     Sig: TAKE 1 TABLET BY MOUTH EVERY DAY     Last seen:  12/19/23

## 2024-03-01 DIAGNOSIS — F41.9 ANXIETY: ICD-10-CM

## 2024-03-01 NOTE — TELEPHONE ENCOUNTER
Requested Prescriptions     Pending Prescriptions Disp Refills    lisinopril (PRINIVIL;ZESTRIL) 10 MG tablet [Pharmacy Med Name: LISINOPRIL 10MG TABLETS] 90 tablet 0     Sig: TAKE 1 TABLET BY MOUTH EVERY DAY    QUEtiapine (SEROQUEL) 50 MG tablet [Pharmacy Med Name: QUETIAPINE 50MG  TABLETS] 90 tablet 0     Sig: TAKE 1 TABLET BY MOUTH EVERY DAY    lovastatin (MEVACOR) 40 MG tablet [Pharmacy Med Name: LOVASTATIN 40MG TABLETS] 90 tablet 0     Sig: TAKE 1 TABLET BY MOUTH EVERY DAY     Last seen:  12/19/23

## 2024-03-02 RX ORDER — LISINOPRIL 10 MG/1
10 TABLET ORAL DAILY
Qty: 30 TABLET | Refills: 0 | Status: SHIPPED | OUTPATIENT
Start: 2024-03-02

## 2024-03-02 RX ORDER — QUETIAPINE FUMARATE 50 MG/1
TABLET, FILM COATED ORAL
Qty: 30 TABLET | Refills: 0 | Status: SHIPPED | OUTPATIENT
Start: 2024-03-02

## 2024-03-02 RX ORDER — LOVASTATIN 40 MG/1
40 TABLET ORAL DAILY
Qty: 30 TABLET | Refills: 0 | Status: SHIPPED | OUTPATIENT
Start: 2024-03-02

## 2024-03-19 ENCOUNTER — OFFICE VISIT (OUTPATIENT)
Dept: FAMILY MEDICINE CLINIC | Age: 84
End: 2024-03-19
Payer: MEDICARE

## 2024-03-19 VITALS
SYSTOLIC BLOOD PRESSURE: 124 MMHG | OXYGEN SATURATION: 95 % | RESPIRATION RATE: 12 BRPM | HEART RATE: 60 BPM | DIASTOLIC BLOOD PRESSURE: 65 MMHG | HEIGHT: 70 IN | TEMPERATURE: 98 F | BODY MASS INDEX: 35.79 KG/M2 | WEIGHT: 250 LBS

## 2024-03-19 DIAGNOSIS — F32.A DEPRESSION, UNSPECIFIED DEPRESSION TYPE: Primary | ICD-10-CM

## 2024-03-19 DIAGNOSIS — G47.00 INSOMNIA, UNSPECIFIED TYPE: ICD-10-CM

## 2024-03-19 DIAGNOSIS — G62.9 NEUROPATHY: ICD-10-CM

## 2024-03-19 PROCEDURE — 99214 OFFICE O/P EST MOD 30 MIN: CPT | Performed by: FAMILY MEDICINE

## 2024-03-19 PROCEDURE — 1123F ACP DISCUSS/DSCN MKR DOCD: CPT | Performed by: FAMILY MEDICINE

## 2024-03-19 RX ORDER — GABAPENTIN 300 MG/1
CAPSULE ORAL
Qty: 180 CAPSULE | Refills: 0 | Status: SHIPPED | OUTPATIENT
Start: 2024-03-19 | End: 2024-04-22

## 2024-03-19 RX ORDER — SERTRALINE HYDROCHLORIDE 100 MG/1
100 TABLET, FILM COATED ORAL DAILY
Qty: 30 TABLET | Refills: 0 | Status: SHIPPED | OUTPATIENT
Start: 2024-03-19

## 2024-03-19 RX ORDER — KETOCONAZOLE 20 MG/G
CREAM TOPICAL 2 TIMES DAILY
COMMUNITY
Start: 2024-03-08

## 2024-03-19 SDOH — ECONOMIC STABILITY: FOOD INSECURITY: WITHIN THE PAST 12 MONTHS, THE FOOD YOU BOUGHT JUST DIDN'T LAST AND YOU DIDN'T HAVE MONEY TO GET MORE.: NEVER TRUE

## 2024-03-19 SDOH — ECONOMIC STABILITY: INCOME INSECURITY: HOW HARD IS IT FOR YOU TO PAY FOR THE VERY BASICS LIKE FOOD, HOUSING, MEDICAL CARE, AND HEATING?: NOT HARD AT ALL

## 2024-03-19 SDOH — ECONOMIC STABILITY: FOOD INSECURITY: WITHIN THE PAST 12 MONTHS, YOU WORRIED THAT YOUR FOOD WOULD RUN OUT BEFORE YOU GOT MONEY TO BUY MORE.: NEVER TRUE

## 2024-03-19 SDOH — ECONOMIC STABILITY: HOUSING INSECURITY
IN THE LAST 12 MONTHS, WAS THERE A TIME WHEN YOU DID NOT HAVE A STEADY PLACE TO SLEEP OR SLEPT IN A SHELTER (INCLUDING NOW)?: NO

## 2024-03-19 NOTE — PROGRESS NOTES
\"Have you been to the ER, urgent care clinic since your last visit?  Hospitalized since your last visit?\"    NO    “Have you seen or consulted any other health care providers outside of [facility] since your last visit?”    NO          Click Here for Release of Records Request  
metFORMIN (GLUCOPHAGE) 500 MG tablet Take 1/2 (one-half) tablet by mouth twice daily 90 tablet 0    metoprolol succinate (TOPROL XL) 25 MG extended release tablet TAKE 1 TABLET BY MOUTH EVERY DAY 90 tablet 0    ASPIRIN PO Take 81 mg by mouth daily      polyethylene glycol (GLYCOLAX) 17 GM/SCOOP powder Take 1 Package by mouth as needed       No current facility-administered medications for this visit.       Social History     Socioeconomic History    Marital status:      Spouse name: None    Number of children: None    Years of education: None    Highest education level: None   Tobacco Use    Smoking status: Former     Current packs/day: 0.00     Average packs/day: 1 pack/day for 49.2 years (49.2 ttl pk-yrs)     Types: Cigarettes     Start date: 1962     Quit date: 2011     Years since quittin.0    Smokeless tobacco: Never   Vaping Use    Vaping Use: Never used   Substance and Sexual Activity    Alcohol use: No    Drug use: Never     Social Determinants of Health     Financial Resource Strain: Low Risk  (3/14/2023)    Overall Financial Resource Strain (CARDIA)     Difficulty of Paying Living Expenses: Not very hard   Transportation Needs: No Transportation Needs (3/14/2023)    PRAPARE - Transportation     Lack of Transportation (Medical): No     Lack of Transportation (Non-Medical): No   Physical Activity: Sufficiently Active (2023)    Exercise Vital Sign     Days of Exercise per Week: 7 days     Minutes of Exercise per Session: 30 min   Housing Stability: Unknown (3/14/2023)    Housing Stability Vital Sign     Unable to Pay for Housing in the Last Year: No     Unstable Housing in the Last Year: No       Family History   Problem Relation Age of Onset    Heart Disease Mother     Heart Disease Father          Physical Exam:  /65 (Site: Right Upper Arm, Position: Sitting, Cuff Size: Large Adult)   Pulse 60   Temp 98 °F (36.7 °C)   Resp 12   Ht 1.778 m (5' 10\")   Wt 113.4 kg (250 lb)

## 2024-03-28 DIAGNOSIS — F41.9 ANXIETY: ICD-10-CM

## 2024-03-29 RX ORDER — QUETIAPINE FUMARATE 50 MG/1
TABLET, FILM COATED ORAL
Qty: 90 TABLET | Refills: 0 | Status: SHIPPED | OUTPATIENT
Start: 2024-03-29

## 2024-03-29 NOTE — TELEPHONE ENCOUNTER
Requested Prescriptions     Pending Prescriptions Disp Refills    QUEtiapine (SEROQUEL) 50 MG tablet [Pharmacy Med Name: QUETIAPINE 50MG  TABLETS] 30 tablet 0     Sig: TAKE 1 TABLET BY MOUTH EVERY DAY     Last seen:  3/19/24

## 2024-04-05 RX ORDER — LOVASTATIN 40 MG/1
40 TABLET ORAL DAILY
Qty: 30 TABLET | Refills: 0 | Status: SHIPPED | OUTPATIENT
Start: 2024-04-05

## 2024-04-05 RX ORDER — LISINOPRIL 10 MG/1
10 TABLET ORAL DAILY
Qty: 30 TABLET | Refills: 0 | Status: SHIPPED | OUTPATIENT
Start: 2024-04-05

## 2024-04-05 NOTE — TELEPHONE ENCOUNTER
Requested Prescriptions     Pending Prescriptions Disp Refills    lisinopril (PRINIVIL;ZESTRIL) 10 MG tablet [Pharmacy Med Name: LISINOPRIL 10MG TABLETS] 30 tablet 0     Sig: TAKE 1 TABLET BY MOUTH EVERY DAY    lovastatin (MEVACOR) 40 MG tablet [Pharmacy Med Name: LOVASTATIN 40MG TABLETS] 30 tablet 0     Sig: TAKE 1 TABLET BY MOUTH EVERY DAY     Last seen:  3/19/24

## 2024-04-22 ENCOUNTER — OFFICE VISIT (OUTPATIENT)
Dept: FAMILY MEDICINE CLINIC | Age: 84
End: 2024-04-22
Payer: MEDICARE

## 2024-04-22 VITALS
OXYGEN SATURATION: 94 % | HEART RATE: 52 BPM | BODY MASS INDEX: 33.86 KG/M2 | RESPIRATION RATE: 12 BRPM | WEIGHT: 250 LBS | SYSTOLIC BLOOD PRESSURE: 104 MMHG | DIASTOLIC BLOOD PRESSURE: 62 MMHG | HEIGHT: 72 IN | TEMPERATURE: 98 F

## 2024-04-22 DIAGNOSIS — I10 PRIMARY HYPERTENSION: ICD-10-CM

## 2024-04-22 DIAGNOSIS — Z00.00 MEDICARE ANNUAL WELLNESS VISIT, SUBSEQUENT: Primary | ICD-10-CM

## 2024-04-22 DIAGNOSIS — E11.9 TYPE 2 DIABETES MELLITUS WITHOUT COMPLICATION, WITHOUT LONG-TERM CURRENT USE OF INSULIN (HCC): ICD-10-CM

## 2024-04-22 DIAGNOSIS — F32.A DEPRESSION, UNSPECIFIED DEPRESSION TYPE: ICD-10-CM

## 2024-04-22 DIAGNOSIS — E78.5 HYPERLIPIDEMIA, UNSPECIFIED HYPERLIPIDEMIA TYPE: ICD-10-CM

## 2024-04-22 PROCEDURE — 3078F DIAST BP <80 MM HG: CPT | Performed by: FAMILY MEDICINE

## 2024-04-22 PROCEDURE — G0439 PPPS, SUBSEQ VISIT: HCPCS | Performed by: FAMILY MEDICINE

## 2024-04-22 PROCEDURE — 3074F SYST BP LT 130 MM HG: CPT | Performed by: FAMILY MEDICINE

## 2024-04-22 PROCEDURE — 1123F ACP DISCUSS/DSCN MKR DOCD: CPT | Performed by: FAMILY MEDICINE

## 2024-04-22 PROCEDURE — 99214 OFFICE O/P EST MOD 30 MIN: CPT | Performed by: FAMILY MEDICINE

## 2024-04-22 RX ORDER — LISINOPRIL 10 MG/1
TABLET ORAL
Qty: 90 TABLET | Refills: 0 | Status: SHIPPED | OUTPATIENT
Start: 2024-04-22

## 2024-04-22 RX ORDER — METOPROLOL SUCCINATE 25 MG/1
TABLET, EXTENDED RELEASE ORAL
Qty: 45 TABLET | Refills: 0 | Status: SHIPPED | OUTPATIENT
Start: 2024-04-22

## 2024-04-22 RX ORDER — SERTRALINE HYDROCHLORIDE 100 MG/1
TABLET, FILM COATED ORAL
Qty: 60 TABLET | Refills: 0 | Status: SHIPPED | OUTPATIENT
Start: 2024-04-22

## 2024-04-22 RX ORDER — LOVASTATIN 40 MG/1
40 TABLET ORAL DAILY
Qty: 90 TABLET | Refills: 1 | Status: SHIPPED | OUTPATIENT
Start: 2024-04-22 | End: 2024-04-24 | Stop reason: SDUPTHER

## 2024-04-22 ASSESSMENT — PATIENT HEALTH QUESTIONNAIRE - PHQ9
SUM OF ALL RESPONSES TO PHQ QUESTIONS 1-9: 14
4. FEELING TIRED OR HAVING LITTLE ENERGY: NEARLY EVERY DAY
9. THOUGHTS THAT YOU WOULD BE BETTER OFF DEAD, OR OF HURTING YOURSELF: SEVERAL DAYS
SUM OF ALL RESPONSES TO PHQ QUESTIONS 1-9: 14
10. IF YOU CHECKED OFF ANY PROBLEMS, HOW DIFFICULT HAVE THESE PROBLEMS MADE IT FOR YOU TO DO YOUR WORK, TAKE CARE OF THINGS AT HOME, OR GET ALONG WITH OTHER PEOPLE: SOMEWHAT DIFFICULT
8. MOVING OR SPEAKING SO SLOWLY THAT OTHER PEOPLE COULD HAVE NOTICED. OR THE OPPOSITE, BEING SO FIGETY OR RESTLESS THAT YOU HAVE BEEN MOVING AROUND A LOT MORE THAN USUAL: NEARLY EVERY DAY
3. TROUBLE FALLING OR STAYING ASLEEP: NOT AT ALL
SUM OF ALL RESPONSES TO PHQ9 QUESTIONS 1 & 2: 6
SUM OF ALL RESPONSES TO PHQ QUESTIONS 1-9: 13
6. FEELING BAD ABOUT YOURSELF - OR THAT YOU ARE A FAILURE OR HAVE LET YOURSELF OR YOUR FAMILY DOWN: NOT AT ALL
1. LITTLE INTEREST OR PLEASURE IN DOING THINGS: NEARLY EVERY DAY
7. TROUBLE CONCENTRATING ON THINGS, SUCH AS READING THE NEWSPAPER OR WATCHING TELEVISION: SEVERAL DAYS
2. FEELING DOWN, DEPRESSED OR HOPELESS: NEARLY EVERY DAY
SUM OF ALL RESPONSES TO PHQ QUESTIONS 1-9: 14

## 2024-04-22 ASSESSMENT — COLUMBIA-SUICIDE SEVERITY RATING SCALE - C-SSRS
6. HAVE YOU EVER DONE ANYTHING, STARTED TO DO ANYTHING, OR PREPARED TO DO ANYTHING TO END YOUR LIFE?: NO
2. HAVE YOU ACTUALLY HAD ANY THOUGHTS OF KILLING YOURSELF?: NO
1. WITHIN THE PAST MONTH, HAVE YOU WISHED YOU WERE DEAD OR WISHED YOU COULD GO TO SLEEP AND NOT WAKE UP?: NO

## 2024-04-22 ASSESSMENT — LIFESTYLE VARIABLES: HOW MANY STANDARD DRINKS CONTAINING ALCOHOL DO YOU HAVE ON A TYPICAL DAY: PATIENT DOES NOT DRINK

## 2024-04-22 ASSESSMENT — ENCOUNTER SYMPTOMS
COUGH: 0
SHORTNESS OF BREATH: 0
ABDOMINAL PAIN: 0

## 2024-04-22 NOTE — PROGRESS NOTES
Successful venipuncture in patient's l ac X 1 sticks.  The patient tolerated this procedure w/o c/o pain or discomfort.

## 2024-04-22 NOTE — PATIENT INSTRUCTIONS
4/22/2024  Medicare offers a range of preventive health benefits. Some of the tests and screenings are paid in full while other may be subject to a deductible, co-insurance, and/or copay.    Some of these benefits include a comprehensive review of your medical history including lifestyle, illnesses that may run in your family, and various assessments and screenings as appropriate.    After reviewing your medical record and screening and assessments performed today your provider may have ordered immunizations, labs, imaging, and/or referrals for you.  A list of these orders (if applicable) as well as your Preventive Care list are included within your After Visit Summary for your review.    Other Preventive Recommendations:    A preventive eye exam performed by an eye specialist is recommended every 1-2 years to screen for glaucoma; cataracts, macular degeneration, and other eye disorders.  A preventive dental visit is recommended every 6 months.  Try to get at least 150 minutes of exercise per week or 10,000 steps per day on a pedometer .  Order or download the FREE \"Exercise & Physical Activity: Your Everyday Guide\" from [employer]. Call 1-321.926.7541 or search [employer] online.  You need 2897-1537 mg of calcium and 7815-6101 IU of vitamin D per day. It is possible to meet your calcium requirement with diet alone, but a vitamin D supplement is usually necessary to meet this goal.  When exposed to the sun, use a sunscreen that protects against both UVA and UVB radiation with an SPF of 30 or greater. Reapply every 2 to 3 hours or after sweating, drying off with a towel, or swimming.  Always wear a seat belt when traveling in a car. Always wear a helmet when riding a bicycle or motorcycle.

## 2024-04-22 NOTE — PROGRESS NOTES
\"Have you been to the ER, urgent care clinic since your last visit?  Hospitalized since your last visit?\"    NO    “Have you seen or consulted any other health care providers outside of [facility] since your last visit?”    NO          Click Here for Release of Records Request

## 2024-04-22 NOTE — PROGRESS NOTES
Jef Sellers is a 84 y.o. male who presents to the office today with the following:  Chief Complaint   Patient presents with    Medicare AWV            HPI  HM reviewed    Last visit Zoloft was increased for Depression  No change, no improvement  Would like to try higher dose  Also on Seroquel 50 mg at night  And doing fine   On Trazodone prn     DM  Taking Metformin 250 mg once a day only  Not watching diet    HTN  Getting a little dizzy and light headed when getting up  No BP checks at home    Neuropathy  Taking 300 mg bid  And helping    Afraid falling  Not exercising  Has not fallen    Review of Systems   Constitutional:  Negative for unexpected weight change.   HENT:  Negative for congestion.    Respiratory:  Negative for cough and shortness of breath.    Cardiovascular:  Negative for chest pain.   Gastrointestinal:  Negative for abdominal pain.   Genitourinary: Negative.    Neurological:  Positive for dizziness.   Psychiatric/Behavioral:  Positive for dysphoric mood.        See HPI.    Past Medical History:   Diagnosis Date    Depression     Hypercholesterolemia     Insomnia     Memory loss     Obesity     Peripheral neuropathy     PVC's (premature ventricular contractions)     Type 2 diabetes mellitus treated without insulin ([facility])        Past Surgical History:   Procedure Laterality Date    COLONOSCOPY  2003, 2010    [place] UNLISTED PROCEDURE CARDIAC SURGERY  9/2010    catheterization       Allergies   Allergen Reactions    Wellbutrin [Bupropion] Hallucinations       Current Outpatient Medications   Medication Sig Dispense Refill    sertraline (ZOLOFT) 100 MG tablet Take one bid 60 tablet 0    metoprolol succinate (TOPROL XL) 25 MG extended release tablet Take half in am 45 tablet 0    lovastatin (MEVACOR) 40 MG tablet Take 1 tablet by mouth daily 90 tablet 1    lisinopril (PRINIVIL;ZESTRIL) 10 MG tablet Take one at night 90 tablet 0    QUEtiapine (SEROQUEL) 50 MG tablet TAKE 1 TABLET BY MOUTH at night

## 2024-04-24 DIAGNOSIS — E78.5 HYPERLIPIDEMIA, UNSPECIFIED HYPERLIPIDEMIA TYPE: ICD-10-CM

## 2024-04-24 LAB
ALBUMIN SERPL-MCNC: 3.9 G/DL (ref 3.5–5)
ALBUMIN/GLOB SERPL: 1.2 (ref 1.1–2.2)
ALP SERPL-CCNC: 65 U/L (ref 45–117)
ALT SERPL-CCNC: 17 U/L (ref 12–78)
ANION GAP SERPL CALC-SCNC: 7 MMOL/L (ref 5–15)
AST SERPL-CCNC: 12 U/L (ref 15–37)
BASOPHILS # BLD: 0.1 K/UL (ref 0–0.1)
BASOPHILS NFR BLD: 1 % (ref 0–1)
BILIRUB SERPL-MCNC: 0.4 MG/DL (ref 0.2–1)
BUN SERPL-MCNC: 28 MG/DL (ref 6–20)
BUN/CREAT SERPL: 18 (ref 12–20)
CALCIUM SERPL-MCNC: 9.3 MG/DL (ref 8.5–10.1)
CHLORIDE SERPL-SCNC: 107 MMOL/L (ref 97–108)
CHOLEST SERPL-MCNC: 120 MG/DL
CO2 SERPL-SCNC: 28 MMOL/L (ref 21–32)
CREAT SERPL-MCNC: 1.54 MG/DL (ref 0.7–1.3)
DIFFERENTIAL METHOD BLD: ABNORMAL
EOSINOPHIL # BLD: 0.4 K/UL (ref 0–0.4)
EOSINOPHIL NFR BLD: 3 % (ref 0–7)
ERYTHROCYTE [DISTWIDTH] IN BLOOD BY AUTOMATED COUNT: 13.9 % (ref 11.5–14.5)
EST. AVERAGE GLUCOSE BLD GHB EST-MCNC: 117 MG/DL
GLOBULIN SER CALC-MCNC: 3.3 G/DL (ref 2–4)
GLUCOSE SERPL-MCNC: 85 MG/DL (ref 65–100)
HBA1C MFR BLD: 5.7 % (ref 4–5.6)
HCT VFR BLD AUTO: 43.2 % (ref 36.6–50.3)
HDLC SERPL-MCNC: 47 MG/DL
HDLC SERPL: 2.6 (ref 0–5)
HGB BLD-MCNC: 13.4 G/DL (ref 12.1–17)
IMM GRANULOCYTES # BLD AUTO: 0 K/UL (ref 0–0.04)
IMM GRANULOCYTES NFR BLD AUTO: 0 % (ref 0–0.5)
LDLC SERPL CALC-MCNC: 37.6 MG/DL (ref 0–100)
LYMPHOCYTES # BLD: 2.8 K/UL (ref 0.8–3.5)
LYMPHOCYTES NFR BLD: 25 % (ref 12–49)
MCH RBC QN AUTO: 32.2 PG (ref 26–34)
MCHC RBC AUTO-ENTMCNC: 31 G/DL (ref 30–36.5)
MCV RBC AUTO: 103.8 FL (ref 80–99)
MONOCYTES # BLD: 0.7 K/UL (ref 0–1)
MONOCYTES NFR BLD: 6 % (ref 5–13)
NEUTS SEG # BLD: 7.6 K/UL (ref 1.8–8)
NEUTS SEG NFR BLD: 65 % (ref 32–75)
NRBC # BLD: 0 K/UL (ref 0–0.01)
NRBC BLD-RTO: 0 PER 100 WBC
PLATELET # BLD AUTO: 235 K/UL (ref 150–400)
PMV BLD AUTO: 11.2 FL (ref 8.9–12.9)
POTASSIUM SERPL-SCNC: 5 MMOL/L (ref 3.5–5.1)
PROT SERPL-MCNC: 7.2 G/DL (ref 6.4–8.2)
RBC # BLD AUTO: 4.16 M/UL (ref 4.1–5.7)
SODIUM SERPL-SCNC: 142 MMOL/L (ref 136–145)
TRIGL SERPL-MCNC: 177 MG/DL
VLDLC SERPL CALC-MCNC: 35.4 MG/DL
WBC # BLD AUTO: 11.6 K/UL (ref 4.1–11.1)

## 2024-04-24 RX ORDER — LOVASTATIN 40 MG/1
20 TABLET ORAL DAILY
Qty: 45 TABLET | Refills: 1 | Status: SHIPPED | OUTPATIENT
Start: 2024-04-24

## 2024-06-23 DIAGNOSIS — G62.9 NEUROPATHY: ICD-10-CM

## 2024-06-23 DIAGNOSIS — F32.A DEPRESSION, UNSPECIFIED DEPRESSION TYPE: ICD-10-CM

## 2024-06-23 DIAGNOSIS — F41.9 ANXIETY: ICD-10-CM

## 2024-06-24 RX ORDER — QUETIAPINE FUMARATE 50 MG/1
TABLET, FILM COATED ORAL
Qty: 90 TABLET | Refills: 0 | Status: SHIPPED | OUTPATIENT
Start: 2024-06-24

## 2024-06-24 RX ORDER — GABAPENTIN 300 MG/1
CAPSULE ORAL
Qty: 60 CAPSULE | Refills: 0 | Status: SHIPPED | OUTPATIENT
Start: 2024-06-24 | End: 2024-07-24

## 2024-06-24 RX ORDER — SERTRALINE HYDROCHLORIDE 100 MG/1
TABLET, FILM COATED ORAL
Qty: 60 TABLET | Refills: 0 | Status: SHIPPED | OUTPATIENT
Start: 2024-06-24

## 2024-06-24 NOTE — TELEPHONE ENCOUNTER
Requested Prescriptions     Pending Prescriptions Disp Refills    gabapentin (NEURONTIN) 300 MG capsule [Pharmacy Med Name: GABAPENTIN 300MG CAPSULES] 180 capsule 0     Sig: TAKE 1 CAPSULE BY MOUTH TWICE DAILY. MAX DAILY AMOUNT 600 MG INDICATIONS: NERVE PAIN    QUEtiapine (SEROQUEL) 50 MG tablet [Pharmacy Med Name: QUETIAPINE 50MG  TABLETS] 90 tablet 0     Sig: TAKE 1 TABLET BY MOUTH AT NIGHT    sertraline (ZOLOFT) 100 MG tablet [Pharmacy Med Name: SERTRALINE 100MG TABLETS] 60 tablet 0     Sig: TAKE 1 TABLET BY MOUTH TWICE DAILY     Last seen:  4/22/24

## 2024-07-01 ENCOUNTER — OFFICE VISIT (OUTPATIENT)
Dept: FAMILY MEDICINE CLINIC | Age: 84
End: 2024-07-01
Payer: MEDICARE

## 2024-07-01 VITALS
WEIGHT: 247 LBS | TEMPERATURE: 97.8 F | HEIGHT: 72 IN | SYSTOLIC BLOOD PRESSURE: 116 MMHG | HEART RATE: 62 BPM | RESPIRATION RATE: 12 BRPM | OXYGEN SATURATION: 94 % | BODY MASS INDEX: 33.46 KG/M2 | DIASTOLIC BLOOD PRESSURE: 71 MMHG

## 2024-07-01 DIAGNOSIS — Z51.89 VISIT FOR WOUND CHECK: ICD-10-CM

## 2024-07-01 DIAGNOSIS — G62.9 NEUROPATHY: ICD-10-CM

## 2024-07-01 DIAGNOSIS — I10 PRIMARY HYPERTENSION: ICD-10-CM

## 2024-07-01 DIAGNOSIS — F32.A DEPRESSION, UNSPECIFIED DEPRESSION TYPE: ICD-10-CM

## 2024-07-01 DIAGNOSIS — S80.212A ABRASION OF LEFT KNEE, INITIAL ENCOUNTER: ICD-10-CM
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
## 2024-07-01 DIAGNOSIS — E11.9 TYPE 2 DIABETES MELLITUS TREATED WITHOUT INSULIN (HCC): ICD-10-CM

## 2024-07-01 DIAGNOSIS — Z91.81 AT HIGH RISK FOR FALLS: ICD-10-CM

## 2024-07-01 DIAGNOSIS — M79.672 LEFT FOOT PAIN: ICD-10-CM

## 2024-07-01 PROCEDURE — 3044F HG A1C LEVEL LT 7.0%: CPT | Performed by: FAMILY MEDICINE

## 2024-07-01 PROCEDURE — 3074F SYST BP LT 130 MM HG: CPT | Performed by: FAMILY MEDICINE

## 2024-07-01 PROCEDURE — 1123F ACP DISCUSS/DSCN MKR DOCD: CPT | Performed by: FAMILY MEDICINE

## 2024-07-01 PROCEDURE — 99214 OFFICE O/P EST MOD 30 MIN: CPT | Performed by: FAMILY MEDICINE

## 2024-07-01 PROCEDURE — 3078F DIAST BP <80 MM HG: CPT | Performed by: FAMILY MEDICINE

## 2024-07-01 ASSESSMENT — PATIENT HEALTH QUESTIONNAIRE - PHQ9
SUM OF ALL RESPONSES TO PHQ9 QUESTIONS 1 & 2: 4
8. MOVING OR SPEAKING SO SLOWLY THAT OTHER PEOPLE COULD HAVE NOTICED. OR THE OPPOSITE, BEING SO FIGETY OR RESTLESS THAT YOU HAVE BEEN MOVING AROUND A LOT MORE THAN USUAL: SEVERAL DAYS
9. THOUGHTS THAT YOU WOULD BE BETTER OFF DEAD, OR OF HURTING YOURSELF: NOT AT ALL
7. TROUBLE CONCENTRATING ON THINGS, SUCH AS READING THE NEWSPAPER OR WATCHING TELEVISION: SEVERAL DAYS
3. TROUBLE FALLING OR STAYING ASLEEP: NOT AT ALL
SUM OF ALL RESPONSES TO PHQ QUESTIONS 1-9: 10
1. LITTLE INTEREST OR PLEASURE IN DOING THINGS: MORE THAN HALF THE DAYS
4. FEELING TIRED OR HAVING LITTLE ENERGY: NEARLY EVERY DAY
6. FEELING BAD ABOUT YOURSELF - OR THAT YOU ARE A FAILURE OR HAVE LET YOURSELF OR YOUR FAMILY DOWN: NOT AT ALL
10. IF YOU CHECKED OFF ANY PROBLEMS, HOW DIFFICULT HAVE THESE PROBLEMS MADE IT FOR YOU TO DO YOUR WORK, TAKE CARE OF THINGS AT HOME, OR GET ALONG WITH OTHER PEOPLE: NOT DIFFICULT AT ALL
5. POOR APPETITE OR OVEREATING: SEVERAL DAYS
SUM OF ALL RESPONSES TO PHQ QUESTIONS 1-9: 10
2. FEELING DOWN, DEPRESSED OR HOPELESS: MORE THAN HALF THE DAYS

## 2024-07-01 NOTE — PROGRESS NOTES
\"Have you been to the ER, urgent care clinic since your last visit?  Hospitalized since your last visit?\"    NO    “Have you seen or consulted any other health care providers outside of [facility] since your last visit?”    NO          Click Here for Release of Records Request

## 2024-07-01 NOTE — PROGRESS NOTES
Jef Sellers is a 84 y.o. male who presents to the office today with the following:  Chief Complaint   Patient presents with    Depression     refills    Fall     Left foot cut       Depression  Fall    Last visit Zoloft increased to 100 bid  Per daughter pt is better  No SE  Still on Seroquel 50 at night too    And on Gabapentin 300 bid  Not sure it is helping but  Wants to keep the dose    Fell three times in last mo  Not dizzy  Has sensation of falling back wards  When turning too quickly  Using walker all the time now  Today fell in rest room   And was standing in front of toilet  Not doing exercises now     Injured left foot  Wound on top of foot used H2O2  Pain walking on bottom of left food    Last week fell on deck,  And was lying on deck for 2 h before daughter came home   Was able to get into shade  Was hallucianting at first but better since then  Daughter gave him fluids  No pain all over and doing fine now    Toes purplish   Occ worse than other days  No pain in toes    Toprol LX decreased to half a day last visit and pulse better  And advised to take Lisinopril at night  No dizziness    Metformin decreased to 250 mg at dinner last visit and after BW came back with HbA1C 5.7, it was stopped  Still watching diet  No BS checks at home  May needs recheck in 1-2 mo    Chol was good and med decreased to half a tablet of Lovastatin 40 last visit      Review of Systems   Psychiatric/Behavioral:  Positive for depression.        See HPI.    Past Medical History:   Diagnosis Date    Depression     Hypercholesterolemia     Insomnia     Memory loss     Obesity     Peripheral neuropathy     PVC's (premature ventricular contractions)     Type 2 diabetes mellitus treated without insulin ([facility])        Past Surgical History:   Procedure Laterality Date    COLONOSCOPY  2003, 2010    [place] UNLISTED PROCEDURE CARDIAC SURGERY  9/2010    catheterization       Allergies   Allergen Reactions    Wellbutrin [Bupropion]

## 2024-07-20 DIAGNOSIS — G62.9 NEUROPATHY: ICD-10-CM

## 2024-07-20 DIAGNOSIS — I10 PRIMARY HYPERTENSION: ICD-10-CM

## 2024-07-20 RX ORDER — METOPROLOL SUCCINATE 25 MG/1
TABLET, EXTENDED RELEASE ORAL
Qty: 45 TABLET | Refills: 0 | Status: SHIPPED | OUTPATIENT
Start: 2024-07-20

## 2024-07-20 RX ORDER — GABAPENTIN 300 MG/1
CAPSULE ORAL
Qty: 60 CAPSULE | OUTPATIENT
Start: 2024-07-20

## 2024-07-20 RX ORDER — LISINOPRIL 10 MG/1
TABLET ORAL
Qty: 90 TABLET | Refills: 0 | Status: SHIPPED | OUTPATIENT
Start: 2024-07-20

## 2024-07-20 RX ORDER — GABAPENTIN 300 MG/1
CAPSULE ORAL
Qty: 60 CAPSULE | Refills: 0 | Status: SHIPPED | OUTPATIENT
Start: 2024-07-20 | End: 2024-08-19

## 2024-08-23 DIAGNOSIS — G62.9 NEUROPATHY: ICD-10-CM

## 2024-08-26 RX ORDER — GABAPENTIN 300 MG/1
CAPSULE ORAL
Qty: 60 CAPSULE | Refills: 0 | Status: SHIPPED | OUTPATIENT
Start: 2024-08-26 | End: 2024-09-30

## 2024-08-26 NOTE — TELEPHONE ENCOUNTER
Requested Prescriptions     Pending Prescriptions Disp Refills    gabapentin (NEURONTIN) 300 MG capsule [Pharmacy Med Name: GABAPENTIN 300MG CAPSULES] 60 capsule      Sig: TAKE 1 CAPSULE BY MOUTH TWICE DAILY. MAX DAILY AMOUNT 600 MG INDICATIONS: NERVE PAIN     LOV 7/1/24  Last labs 4/23/24  Last refill 7/20/24 #60 with 0 RF

## 2024-09-03 ENCOUNTER — OFFICE VISIT (OUTPATIENT)
Dept: FAMILY MEDICINE CLINIC | Age: 84
End: 2024-09-03
Payer: MEDICARE

## 2024-09-03 VITALS
HEIGHT: 72 IN | HEART RATE: 67 BPM | BODY MASS INDEX: 33.46 KG/M2 | DIASTOLIC BLOOD PRESSURE: 54 MMHG | TEMPERATURE: 98.4 F | WEIGHT: 247 LBS | OXYGEN SATURATION: 96 % | SYSTOLIC BLOOD PRESSURE: 96 MMHG | RESPIRATION RATE: 12 BRPM

## 2024-09-03 DIAGNOSIS — G62.9 NEUROPATHY: ICD-10-CM

## 2024-09-03 DIAGNOSIS — E78.5 HYPERLIPIDEMIA, UNSPECIFIED HYPERLIPIDEMIA TYPE: ICD-10-CM

## 2024-09-03 DIAGNOSIS — L21.9 CHRONIC SEBORRHEIC DERMATITIS: ICD-10-CM

## 2024-09-03 DIAGNOSIS — F32.A DEPRESSION, UNSPECIFIED DEPRESSION TYPE: Primary | ICD-10-CM

## 2024-09-03 DIAGNOSIS — H10.503 BLEPHAROCONJUNCTIVITIS OF BOTH EYES, UNSPECIFIED BLEPHAROCONJUNCTIVITIS TYPE: ICD-10-CM

## 2024-09-03 DIAGNOSIS — R41.0 ACUTE CONFUSION: ICD-10-CM

## 2024-09-03 DIAGNOSIS — E11.9 TYPE 2 DIABETES MELLITUS TREATED WITHOUT INSULIN (HCC): ICD-10-CM

## 2024-09-03 DIAGNOSIS — I10 PRIMARY HYPERTENSION: ICD-10-CM

## 2024-09-03 LAB
BILIRUBIN, URINE, POC: NEGATIVE
BLOOD URINE, POC: NEGATIVE
GLUCOSE URINE, POC: NEGATIVE
KETONES, URINE, POC: NEGATIVE
LEUKOCYTE ESTERASE, URINE, POC: NEGATIVE
NITRITE, URINE, POC: NEGATIVE
PH, URINE, POC: 5.5 (ref 4.6–8)
PROTEIN,URINE, POC: NEGATIVE
SPECIFIC GRAVITY, URINE, POC: 1.02 (ref 1–1.03)
URINALYSIS CLARITY, POC: CLEAR
URINALYSIS COLOR, POC: YELLOW
UROBILINOGEN, POC: NORMAL

## 2024-09-03 PROCEDURE — 81003 URINALYSIS AUTO W/O SCOPE: CPT | Performed by: FAMILY MEDICINE

## 2024-09-03 PROCEDURE — 36415 COLL VENOUS BLD VENIPUNCTURE: CPT | Performed by: FAMILY MEDICINE

## 2024-09-03 PROCEDURE — 99214 OFFICE O/P EST MOD 30 MIN: CPT | Performed by: FAMILY MEDICINE

## 2024-09-03 PROCEDURE — 3044F HG A1C LEVEL LT 7.0%: CPT | Performed by: FAMILY MEDICINE

## 2024-09-03 PROCEDURE — 1123F ACP DISCUSS/DSCN MKR DOCD: CPT | Performed by: FAMILY MEDICINE

## 2024-09-03 PROCEDURE — 3078F DIAST BP <80 MM HG: CPT | Performed by: FAMILY MEDICINE

## 2024-09-03 PROCEDURE — 3074F SYST BP LT 130 MM HG: CPT | Performed by: FAMILY MEDICINE

## 2024-09-03 RX ORDER — PAROXETINE 20 MG/1
20 TABLET, FILM COATED ORAL DAILY
Qty: 30 TABLET | Refills: 0 | Status: SHIPPED | OUTPATIENT
Start: 2024-09-03

## 2024-09-03 RX ORDER — KETOCONAZOLE 20 MG/G
CREAM TOPICAL 2 TIMES DAILY
Qty: 30 G | Refills: 1 | Status: SHIPPED | OUTPATIENT
Start: 2024-09-03

## 2024-09-03 RX ORDER — POLYMYXIN B SULFATE AND TRIMETHOPRIM 1; 10000 MG/ML; [USP'U]/ML
1 SOLUTION OPHTHALMIC EVERY 6 HOURS
Qty: 10 ML | Refills: 0 | Status: SHIPPED | OUTPATIENT
Start: 2024-09-03 | End: 2024-09-13

## 2024-09-03 ASSESSMENT — PATIENT HEALTH QUESTIONNAIRE - PHQ9
5. POOR APPETITE OR OVEREATING: NOT AT ALL
SUM OF ALL RESPONSES TO PHQ QUESTIONS 1-9: 0
1. LITTLE INTEREST OR PLEASURE IN DOING THINGS: MORE THAN HALF THE DAYS
2. FEELING DOWN, DEPRESSED OR HOPELESS: NEARLY EVERY DAY
SUM OF ALL RESPONSES TO PHQ QUESTIONS 1-9: 15
4. FEELING TIRED OR HAVING LITTLE ENERGY: NEARLY EVERY DAY
6. FEELING BAD ABOUT YOURSELF - OR THAT YOU ARE A FAILURE OR HAVE LET YOURSELF OR YOUR FAMILY DOWN: SEVERAL DAYS
SUM OF ALL RESPONSES TO PHQ QUESTIONS 1-9: 0
9. THOUGHTS THAT YOU WOULD BE BETTER OFF DEAD, OR OF HURTING YOURSELF: NOT AT ALL
SUM OF ALL RESPONSES TO PHQ QUESTIONS 1-9: 15
SUM OF ALL RESPONSES TO PHQ9 QUESTIONS 1 & 2: 0
SUM OF ALL RESPONSES TO PHQ QUESTIONS 1-9: 0
7. TROUBLE CONCENTRATING ON THINGS, SUCH AS READING THE NEWSPAPER OR WATCHING TELEVISION: NEARLY EVERY DAY
SUM OF ALL RESPONSES TO PHQ QUESTIONS 1-9: 0
SUM OF ALL RESPONSES TO PHQ QUESTIONS 1-9: 15
3. TROUBLE FALLING OR STAYING ASLEEP: MORE THAN HALF THE DAYS
10. IF YOU CHECKED OFF ANY PROBLEMS, HOW DIFFICULT HAVE THESE PROBLEMS MADE IT FOR YOU TO DO YOUR WORK, TAKE CARE OF THINGS AT HOME, OR GET ALONG WITH OTHER PEOPLE: SOMEWHAT DIFFICULT
SUM OF ALL RESPONSES TO PHQ9 QUESTIONS 1 & 2: 5
2. FEELING DOWN, DEPRESSED OR HOPELESS: NOT AT ALL
1. LITTLE INTEREST OR PLEASURE IN DOING THINGS: NOT AT ALL
SUM OF ALL RESPONSES TO PHQ QUESTIONS 1-9: 15
8. MOVING OR SPEAKING SO SLOWLY THAT OTHER PEOPLE COULD HAVE NOTICED. OR THE OPPOSITE, BEING SO FIGETY OR RESTLESS THAT YOU HAVE BEEN MOVING AROUND A LOT MORE THAN USUAL: SEVERAL DAYS

## 2024-09-03 ASSESSMENT — ENCOUNTER SYMPTOMS
ABDOMINAL PAIN: 0
COUGH: 0
BACK PAIN: 0

## 2024-09-03 ASSESSMENT — COLUMBIA-SUICIDE SEVERITY RATING SCALE - C-SSRS
2. HAVE YOU ACTUALLY HAD ANY THOUGHTS OF KILLING YOURSELF?: NO
6. HAVE YOU EVER DONE ANYTHING, STARTED TO DO ANYTHING, OR PREPARED TO DO ANYTHING TO END YOUR LIFE?: NO
1. WITHIN THE PAST MONTH, HAVE YOU WISHED YOU WERE DEAD OR WISHED YOU COULD GO TO SLEEP AND NOT WAKE UP?: NO

## 2024-09-03 NOTE — PROGRESS NOTES
Jef Sellers is a 84 y.o. male who presents to the office today with the following:  Chief Complaint   Patient presents with    Depression     Follow up       Depression    Depression  On Zoloft twice a day and Seroquel 50 at night  No changes in Depression even though Zoloft was increased  Has some anxiety  Never tried Paxil    Could not figure out computer and TV  Was very confused yesterday  Not normal per daughter    DM  Last HbA1C 5.7  And advised to stop the Metformin 4 -5 mo ago  Daughter cooks healthy    And Chol medication decreased to half a tablet then  Due for recheck    Last Creatinine 1.54    HTN  BP is low  On Metoprolol and Lisinopril    Needs refills    On Gabapentin and has enough for a mo    Review of Systems   Constitutional:  Negative for fever.   HENT:  Negative for congestion.    Respiratory:  Negative for cough.    Gastrointestinal:  Negative for abdominal pain.   Genitourinary:  Positive for frequency. Negative for dysuria, flank pain and hematuria.   Musculoskeletal:  Negative for back pain.   Psychiatric/Behavioral:  Positive for depression.        See HPI.    Past Medical History:   Diagnosis Date    Depression     Hypercholesterolemia     Insomnia     Memory loss     Obesity     Peripheral neuropathy     PVC's (premature ventricular contractions)     Type 2 diabetes mellitus treated without insulin ([facility])        Past Surgical History:   Procedure Laterality Date    COLONOSCOPY  2003, 2010    [place] UNLISTED PROCEDURE CARDIAC SURGERY  9/2010    catheterization       Allergies   Allergen Reactions    Wellbutrin [Bupropion] Hallucinations       Current Outpatient Medications   Medication Sig Dispense Refill    PARoxetine (PAXIL) 20 MG tablet Take 1 tablet by mouth daily 30 tablet 0    trimethoprim-polymyxin b (POLYTRIM) 88108-9.1 UNIT/ML-% ophthalmic solution Place 1 drop into both eyes every 6 hours for 10 days 10 mL 0    ketoconazole (NIZORAL) 2 % cream Apply topically 2 times daily 30 g

## 2024-09-03 NOTE — PROGRESS NOTES
Successful venipuncture in patient's rac X 1 sticks.  The patient tolerated this procedure w/o c/o pain or discomfort.\"Have you been to the ER, urgent care clinic since your last visit?  Hospitalized since your last visit?\"    NO    “Have you seen or consulted any other health care providers outside of [facility] since your last visit?”    NO          Click Here for Release of Records Request

## 2024-09-04 LAB
ANION GAP SERPL CALC-SCNC: 6 MMOL/L (ref 5–15)
BUN SERPL-MCNC: 32 MG/DL (ref 6–20)
BUN/CREAT SERPL: 21 (ref 12–20)
CALCIUM SERPL-MCNC: 9.2 MG/DL (ref 8.5–10.1)
CHLORIDE SERPL-SCNC: 103 MMOL/L (ref 97–108)
CHOLEST SERPL-MCNC: 137 MG/DL
CO2 SERPL-SCNC: 29 MMOL/L (ref 21–32)
CREAT SERPL-MCNC: 1.52 MG/DL (ref 0.7–1.3)
ERYTHROCYTE [DISTWIDTH] IN BLOOD BY AUTOMATED COUNT: 14.4 % (ref 11.5–14.5)
EST. AVERAGE GLUCOSE BLD GHB EST-MCNC: 120 MG/DL
GLUCOSE SERPL-MCNC: 88 MG/DL (ref 65–100)
HBA1C MFR BLD: 5.8 % (ref 4–5.6)
HCT VFR BLD AUTO: 43.7 % (ref 36.6–50.3)
HDLC SERPL-MCNC: 52 MG/DL
HDLC SERPL: 2.6 (ref 0–5)
HGB BLD-MCNC: 13.9 G/DL (ref 12.1–17)
LDLC SERPL CALC-MCNC: 51.6 MG/DL (ref 0–100)
MCH RBC QN AUTO: 32.3 PG (ref 26–34)
MCHC RBC AUTO-ENTMCNC: 31.8 G/DL (ref 30–36.5)
MCV RBC AUTO: 101.6 FL (ref 80–99)
NRBC # BLD: 0 K/UL (ref 0–0.01)
NRBC BLD-RTO: 0 PER 100 WBC
PLATELET # BLD AUTO: 271 K/UL (ref 150–400)
PMV BLD AUTO: 10.4 FL (ref 8.9–12.9)
POTASSIUM SERPL-SCNC: 5.2 MMOL/L (ref 3.5–5.1)
RBC # BLD AUTO: 4.3 M/UL (ref 4.1–5.7)
SODIUM SERPL-SCNC: 138 MMOL/L (ref 136–145)
TRIGL SERPL-MCNC: 167 MG/DL
VLDLC SERPL CALC-MCNC: 33.4 MG/DL
WBC # BLD AUTO: 11.4 K/UL (ref 4.1–11.1)

## 2024-09-04 RX ORDER — LOVASTATIN 10 MG
10 TABLET ORAL NIGHTLY
Qty: 90 TABLET | Refills: 1 | Status: SHIPPED | OUTPATIENT
Start: 2024-09-04

## 2024-09-21 DIAGNOSIS — F41.9 ANXIETY: ICD-10-CM

## 2024-09-22 DIAGNOSIS — F32.A DEPRESSION, UNSPECIFIED DEPRESSION TYPE: ICD-10-CM

## 2024-09-22 DIAGNOSIS — G62.9 NEUROPATHY: ICD-10-CM

## 2024-09-23 DIAGNOSIS — F32.A DEPRESSION, UNSPECIFIED DEPRESSION TYPE: ICD-10-CM

## 2024-09-23 RX ORDER — QUETIAPINE FUMARATE 50 MG/1
TABLET, FILM COATED ORAL
Qty: 90 TABLET | Refills: 0 | Status: SHIPPED | OUTPATIENT
Start: 2024-09-23

## 2024-09-23 RX ORDER — PAROXETINE 20 MG/1
20 TABLET, FILM COATED ORAL DAILY
Qty: 30 TABLET | Refills: 0 | OUTPATIENT
Start: 2024-09-23

## 2024-09-23 RX ORDER — GABAPENTIN 300 MG/1
CAPSULE ORAL
Qty: 60 CAPSULE | Refills: 2 | Status: SHIPPED | OUTPATIENT
Start: 2024-09-23 | End: 2024-12-23

## 2024-09-24 RX ORDER — PAROXETINE 20 MG/1
20 TABLET, FILM COATED ORAL DAILY
Qty: 30 TABLET | Refills: 0 | OUTPATIENT
Start: 2024-09-24

## 2024-10-03 ENCOUNTER — OFFICE VISIT (OUTPATIENT)
Dept: FAMILY MEDICINE CLINIC | Age: 84
End: 2024-10-03
Payer: MEDICARE

## 2024-10-03 VITALS
DIASTOLIC BLOOD PRESSURE: 72 MMHG | HEART RATE: 86 BPM | BODY MASS INDEX: 32.37 KG/M2 | RESPIRATION RATE: 12 BRPM | WEIGHT: 239 LBS | OXYGEN SATURATION: 94 % | HEIGHT: 72 IN | SYSTOLIC BLOOD PRESSURE: 105 MMHG | TEMPERATURE: 97.9 F

## 2024-10-03 DIAGNOSIS — F32.A DEPRESSION, UNSPECIFIED DEPRESSION TYPE: Primary | ICD-10-CM

## 2024-10-03 DIAGNOSIS — H10.31 ACUTE BACTERIAL CONJUNCTIVITIS OF RIGHT EYE: ICD-10-CM

## 2024-10-03 DIAGNOSIS — Z23 IMMUNIZATION DUE: ICD-10-CM

## 2024-10-03 DIAGNOSIS — I10 PRIMARY HYPERTENSION: ICD-10-CM

## 2024-10-03 PROCEDURE — 3074F SYST BP LT 130 MM HG: CPT | Performed by: FAMILY MEDICINE

## 2024-10-03 PROCEDURE — 3078F DIAST BP <80 MM HG: CPT | Performed by: FAMILY MEDICINE

## 2024-10-03 PROCEDURE — 1123F ACP DISCUSS/DSCN MKR DOCD: CPT | Performed by: FAMILY MEDICINE

## 2024-10-03 PROCEDURE — 90653 IIV ADJUVANT VACCINE IM: CPT | Performed by: FAMILY MEDICINE

## 2024-10-03 PROCEDURE — G0008 ADMIN INFLUENZA VIRUS VAC: HCPCS | Performed by: FAMILY MEDICINE

## 2024-10-03 PROCEDURE — 99214 OFFICE O/P EST MOD 30 MIN: CPT | Performed by: FAMILY MEDICINE

## 2024-10-03 RX ORDER — LISINOPRIL 10 MG/1
TABLET ORAL
Qty: 90 TABLET | Refills: 0 | Status: SHIPPED | OUTPATIENT
Start: 2024-10-03

## 2024-10-03 RX ORDER — PAROXETINE 20 MG/1
20 TABLET, FILM COATED ORAL DAILY
Qty: 90 TABLET | Refills: 1 | Status: SHIPPED | OUTPATIENT
Start: 2024-10-03

## 2024-10-03 ASSESSMENT — PATIENT HEALTH QUESTIONNAIRE - PHQ9
2. FEELING DOWN, DEPRESSED OR HOPELESS: SEVERAL DAYS
SUM OF ALL RESPONSES TO PHQ QUESTIONS 1-9: 2
SUM OF ALL RESPONSES TO PHQ9 QUESTIONS 1 & 2: 2
SUM OF ALL RESPONSES TO PHQ QUESTIONS 1-9: 2
1. LITTLE INTEREST OR PLEASURE IN DOING THINGS: SEVERAL DAYS
SUM OF ALL RESPONSES TO PHQ QUESTIONS 1-9: 2
SUM OF ALL RESPONSES TO PHQ QUESTIONS 1-9: 2

## 2024-10-03 NOTE — PROGRESS NOTES
Jef Sellers is a 84 y.o. male who presents to the office today with the following:  Chief Complaint   Patient presents with    Depression      Follow up, somewhat better       Depression    Last visit pt was started on Paxil 20  Instead of Zoloft  Still on Seroquel at night  Doing much better  More active  No SE  Taking a class now  And went to [place]      And Last visit Metoprolol was stopped d/t low BP  Still on Lisinopril 10  BP still on low side  No dizziness  Much more active    Eye better  Still has drops    Review of Systems   Psychiatric/Behavioral:  Positive for depression.        See HPI.    Past Medical History:   Diagnosis Date    Depression     Hypercholesterolemia     Insomnia     Memory loss     Obesity     Peripheral neuropathy     PVC's (premature ventricular contractions)     Type 2 diabetes mellitus treated without insulin ([facility])        Past Surgical History:   Procedure Laterality Date    COLONOSCOPY  2003, 2010    [place] UNLISTED PROCEDURE CARDIAC SURGERY  9/2010    catheterization       Allergies   Allergen Reactions    Wellbutrin [Bupropion] Hallucinations       Current Outpatient Medications   Medication Sig Dispense Refill    lisinopril (PRINIVIL;ZESTRIL) 10 MG tablet TAKE 1 TABLET BY MOUTH AT NIGHT 90 tablet 0    PARoxetine (PAXIL) 20 MG tablet Take 1 tablet by mouth daily 90 tablet 1    QUEtiapine (SEROQUEL) 50 MG tablet TAKE 1 TABLET BY MOUTH AT NIGHT 90 tablet 0    gabapentin (NEURONTIN) 300 MG capsule TAKE 1 CAPSULE BY MOUTH TWICE DAILY. MAX DAILY AMOUNT 600 MG INDICATIONS: NERVE PAIN 60 capsule 2    lovastatin (MEVACOR) 10 MG tablet Take 1 tablet by mouth nightly 90 tablet 1    ketoconazole (NIZORAL) 2 % cream Apply topically 2 times daily 30 g 1    ASPIRIN PO Take 81 mg by mouth daily      polyethylene glycol (GLYCOLAX) 17 GM/SCOOP powder Take 1 Package by mouth as needed       No current facility-administered medications for this visit.       Social History     Socioeconomic

## 2024-10-03 NOTE — PROGRESS NOTES
\"Have you been to the ER, urgent care clinic since your last visit?  Hospitalized since your last visit?\"    NO    “Have you seen or consulted any other health care providers outside our system since your last visit?”    NO

## 2024-10-03 NOTE — PATIENT INSTRUCTIONS
vaccine:  Has had an allergic reaction after a previous dose of influenza vaccine, or has any severe, life-threatening allergies  Has ever had Guillain-Barré Syndrome (also called \"GBS\")  In some cases, your health care provider may decide to postpone influenza vaccination until a future visit.  Influenza vaccine can be administered at any time during pregnancy. People who are or will be pregnant during influenza season should receive inactivated influenza vaccine.  People with minor illnesses, such as a cold, may be vaccinated. People who are moderately or severely ill should usually wait until they recover before getting influenza vaccine.  Your health care provider can give you more information.  Risks of a vaccine reaction  Soreness, redness, and swelling where the shot is given, fever, muscle aches, and headache can happen after influenza vaccination.  There may be a very small increased risk of Guillain-Barré Syndrome (GBS) after inactivated influenza vaccine (the flu shot).  Young children who get the flu shot along with pneumococcal vaccine (PCV13) and/or DTaP vaccine at the same time might be slightly more likely to have a seizure caused by fever. Tell your health care provider if a child who is getting flu vaccine has ever had a seizure.  People sometimes faint after medical procedures, including vaccination. Tell your provider if you feel dizzy or have vision changes or ringing in the ears.  As with any medicine, there is a very remote chance of a vaccine causing a severe allergic reaction, other serious injury, or death.  What if there is a serious problem?  An allergic reaction could occur after the vaccinated person leaves the clinic. If you see signs of a severe allergic reaction (hives, swelling of the face and throat, difficulty breathing, a fast heartbeat, dizziness, or weakness), call 9-1-1 and get the person to the nearest hospital.  For other signs that concern you, call your health care

## 2024-10-03 NOTE — PROGRESS NOTES
Patient was administered Flu shot in left deltoid via IM.  Patient tolerated Flu shot well.  Medication information reviewed with patient, patient states understanding. Patient to resume routine medications at home.  Patient given copy of AVS and VIIS with medication information and instructions for home. VIIS reviewed with patient and patient states understanding.

## 2024-10-03 NOTE — PROGRESS NOTES
Chief Complaint   Patient presents with    Depression      Follow up, somewhat better     /72 (Site: Right Upper Arm, Position: Sitting, Cuff Size: Large Adult)   Pulse 86   Temp 97.9 °F (36.6 °C)   Resp 12   Ht 1.829 m (6')   Wt 108.4 kg (239 lb)   SpO2 94%   BMI 32.41 kg/m²

## 2024-11-10 DIAGNOSIS — E78.5 HYPERLIPIDEMIA, UNSPECIFIED HYPERLIPIDEMIA TYPE: ICD-10-CM

## 2024-11-11 RX ORDER — LOVASTATIN 40 MG/1
40 TABLET ORAL DAILY
Qty: 90 TABLET | Refills: 1 | OUTPATIENT
Start: 2024-11-11

## 2024-11-11 NOTE — TELEPHONE ENCOUNTER
Patient requesting refill on     Requested Prescriptions     Pending Prescriptions Disp Refills    lovastatin (MEVACOR) 40 MG tablet [Pharmacy Med Name: LOVASTATIN 40MG TABLETS] 90 tablet 1     Sig: Take 1 tablet by mouth daily        Last OV 10/3/2024

## 2024-11-25 DIAGNOSIS — G62.9 NEUROPATHY: ICD-10-CM

## 2024-11-25 DIAGNOSIS — E78.5 HYPERLIPIDEMIA, UNSPECIFIED HYPERLIPIDEMIA TYPE: ICD-10-CM

## 2024-11-25 RX ORDER — GABAPENTIN 300 MG/1
CAPSULE ORAL
Qty: 60 CAPSULE | Refills: 2 | OUTPATIENT
Start: 2024-11-25 | End: 2025-02-24

## 2024-11-25 RX ORDER — LOVASTATIN 10 MG/1
10 TABLET ORAL NIGHTLY
Qty: 90 TABLET | Refills: 1 | OUTPATIENT
Start: 2024-11-25

## 2024-11-25 NOTE — TELEPHONE ENCOUNTER
Patient could not get an appt with Dr. Sung until Monday 12.9.2024 and will be out her medication tomorrow.  Can he get a 2 week supply to get him though:    Lovastatin 10 mg tablets  Gabapentin 300 mg capsules    Send to:    Cayden Bird

## 2024-11-25 NOTE — TELEPHONE ENCOUNTER
Requested Prescriptions     Pending Prescriptions Disp Refills    lovastatin (MEVACOR) 10 MG tablet 90 tablet 1     Sig: Take 1 tablet by mouth nightly    gabapentin (NEURONTIN) 300 MG capsule 60 capsule 2     Sig: TAKE 1 CAPSULE BY MOUTH TWICE DAILY. MAX DAILY AMOUNT 600 MG INDICATIONS: NERVE PAIN     LOV 10/3/24 (Upcoming appointment 12/9/24)  Last labs 9/3/24  Last refill:  Lovastatin 9/4/24 #90 with 0 RF  Gabapentin 9/23/24 #60 with 2 RF

## 2024-12-09 ENCOUNTER — OFFICE VISIT (OUTPATIENT)
Dept: FAMILY MEDICINE CLINIC | Age: 84
End: 2024-12-09
Payer: MEDICARE

## 2024-12-09 VITALS
DIASTOLIC BLOOD PRESSURE: 70 MMHG | HEIGHT: 72 IN | RESPIRATION RATE: 12 BRPM | BODY MASS INDEX: 33.59 KG/M2 | HEART RATE: 77 BPM | SYSTOLIC BLOOD PRESSURE: 110 MMHG | WEIGHT: 248 LBS | TEMPERATURE: 97.8 F | OXYGEN SATURATION: 96 %

## 2024-12-09 DIAGNOSIS — G62.9 NEUROPATHY: ICD-10-CM

## 2024-12-09 DIAGNOSIS — L97.529 ULCER OF BOTH FEET, UNSPECIFIED ULCER STAGE (HCC): ICD-10-CM

## 2024-12-09 DIAGNOSIS — L97.519 ULCER OF BOTH FEET, UNSPECIFIED ULCER STAGE (HCC): ICD-10-CM

## 2024-12-09 DIAGNOSIS — R20.9 BILATERAL COLD FEET: Primary | ICD-10-CM

## 2024-12-09 PROCEDURE — 1159F MED LIST DOCD IN RCRD: CPT | Performed by: FAMILY MEDICINE

## 2024-12-09 PROCEDURE — 99213 OFFICE O/P EST LOW 20 MIN: CPT | Performed by: FAMILY MEDICINE

## 2024-12-09 PROCEDURE — 1125F AMNT PAIN NOTED PAIN PRSNT: CPT | Performed by: FAMILY MEDICINE

## 2024-12-09 PROCEDURE — 1123F ACP DISCUSS/DSCN MKR DOCD: CPT | Performed by: FAMILY MEDICINE

## 2024-12-09 RX ORDER — GABAPENTIN 300 MG/1
CAPSULE ORAL
Qty: 60 CAPSULE | Refills: 2 | Status: SHIPPED | OUTPATIENT
Start: 2024-12-09 | End: 2025-03-10

## 2024-12-09 RX ORDER — TRAZODONE HYDROCHLORIDE 50 MG/1
50 TABLET, FILM COATED ORAL PRN
COMMUNITY

## 2024-12-09 ASSESSMENT — PATIENT HEALTH QUESTIONNAIRE - PHQ9
SUM OF ALL RESPONSES TO PHQ QUESTIONS 1-9: 2
SUM OF ALL RESPONSES TO PHQ9 QUESTIONS 1 & 2: 2
SUM OF ALL RESPONSES TO PHQ QUESTIONS 1-9: 2
2. FEELING DOWN, DEPRESSED OR HOPELESS: SEVERAL DAYS
1. LITTLE INTEREST OR PLEASURE IN DOING THINGS: SEVERAL DAYS

## 2024-12-09 NOTE — PROGRESS NOTES
Jef Sellers is a 84 y.o. male who presents to the office today with the following:  Chief Complaint   Patient presents with    Wound Check     L ankle x 1 mo       Wound Check      Last BW good  Chol med was decreased  Last HbA1C 5.8  Due for recheck in 3 mo    Left lateral ankle wound  Shaved skin off about a mo ago  But wound not healing    Tried sofar peroxide and dressing with vaseline pad  And gauze  Has gotten a little smaller    Right great toe with smaller wound    Cold feet a lot  And blue and pain    Neuropathy  Needs refill of Gabapentin      Review of Systems    See HPI.    Past Medical History:   Diagnosis Date    Depression     Hypercholesterolemia     Insomnia     Memory loss     Obesity     Peripheral neuropathy     PVC's (premature ventricular contractions)     Type 2 diabetes mellitus treated without insulin ([facility])        Past Surgical History:   Procedure Laterality Date    COLONOSCOPY  2003, 2010    [place] UNLISTED PROCEDURE CARDIAC SURGERY  9/2010    catheterization       Allergies   Allergen Reactions    Wellbutrin [Bupropion] Hallucinations       Current Outpatient Medications   Medication Sig Dispense Refill    traZODone (DESYREL) 50 MG tablet Take 1 tablet by mouth as needed for Sleep      gabapentin (NEURONTIN) 300 MG capsule TAKE 1 CAPSULE BY MOUTH TWICE DAILY. MAX DAILY AMOUNT 600 MG INDICATIONS: NERVE PAIN 60 capsule 2    lisinopril (PRINIVIL;ZESTRIL) 10 MG tablet TAKE 1 TABLET BY MOUTH AT NIGHT 90 tablet 0    PARoxetine (PAXIL) 20 MG tablet Take 1 tablet by mouth daily 90 tablet 1    QUEtiapine (SEROQUEL) 50 MG tablet TAKE 1 TABLET BY MOUTH AT NIGHT 90 tablet 0    lovastatin (MEVACOR) 10 MG tablet Take 1 tablet by mouth nightly 90 tablet 1    ketoconazole (NIZORAL) 2 % cream Apply topically 2 times daily 30 g 1    ASPIRIN PO Take 81 mg by mouth daily      polyethylene glycol (GLYCOLAX) 17 GM/SCOOP powder Take 1 Package by mouth as needed       No current facility-administered

## 2024-12-25 DIAGNOSIS — F41.9 ANXIETY: ICD-10-CM

## 2024-12-26 RX ORDER — QUETIAPINE FUMARATE 50 MG/1
TABLET, FILM COATED ORAL
Qty: 90 TABLET | Refills: 0 | Status: SHIPPED | OUTPATIENT
Start: 2024-12-26

## 2024-12-26 NOTE — TELEPHONE ENCOUNTER
Patient requesting refill on     Requested Prescriptions     Pending Prescriptions Disp Refills    QUEtiapine (SEROQUEL) 50 MG tablet [Pharmacy Med Name: QUETIAPINE 50MG  TABLETS] 90 tablet 0     Sig: TAKE 1 TABLET BY MOUTH AT NIGHT        Last OV 12/9/2024

## 2024-12-30 ENCOUNTER — OFFICE VISIT (OUTPATIENT)
Dept: FAMILY MEDICINE CLINIC | Age: 84
End: 2024-12-30
Payer: MEDICARE

## 2024-12-30 VITALS
HEART RATE: 75 BPM | BODY MASS INDEX: 34.4 KG/M2 | RESPIRATION RATE: 16 BRPM | OXYGEN SATURATION: 95 % | WEIGHT: 254 LBS | DIASTOLIC BLOOD PRESSURE: 66 MMHG | SYSTOLIC BLOOD PRESSURE: 120 MMHG | HEIGHT: 72 IN | TEMPERATURE: 98 F

## 2024-12-30 DIAGNOSIS — L97.322 ANKLE ULCER, LEFT, WITH FAT LAYER EXPOSED (HCC): Primary | ICD-10-CM

## 2024-12-30 PROCEDURE — 1126F AMNT PAIN NOTED NONE PRSNT: CPT | Performed by: FAMILY MEDICINE

## 2024-12-30 PROCEDURE — 99214 OFFICE O/P EST MOD 30 MIN: CPT | Performed by: FAMILY MEDICINE

## 2024-12-30 PROCEDURE — 1123F ACP DISCUSS/DSCN MKR DOCD: CPT | Performed by: FAMILY MEDICINE

## 2024-12-30 PROCEDURE — 1159F MED LIST DOCD IN RCRD: CPT | Performed by: FAMILY MEDICINE

## 2024-12-30 ASSESSMENT — PATIENT HEALTH QUESTIONNAIRE - PHQ9
SUM OF ALL RESPONSES TO PHQ QUESTIONS 1-9: 0
SUM OF ALL RESPONSES TO PHQ QUESTIONS 1-9: 0
2. FEELING DOWN, DEPRESSED OR HOPELESS: NOT AT ALL
SUM OF ALL RESPONSES TO PHQ9 QUESTIONS 1 & 2: 0
1. LITTLE INTEREST OR PLEASURE IN DOING THINGS: NOT AT ALL
SUM OF ALL RESPONSES TO PHQ QUESTIONS 1-9: 0
SUM OF ALL RESPONSES TO PHQ QUESTIONS 1-9: 0

## 2024-12-30 NOTE — PROGRESS NOTES
\"Have you been to the ER, urgent care clinic since your last visit?  Hospitalized since your last visit?\"    NO    “Have you seen or consulted any other health care providers outside our system since your last visit?”    NO  12/30/2024      Chief Complaint   Patient presents with    Wound Check     2 week check of wound         History of Present Illness:         is a 84 y.o. male to recheck L lateral ankle ulcer. Arterial dopplers ordered two weeks ago have not been scheduled. No pain. No drainage.      Allergies   Allergen Reactions    Wellbutrin [Bupropion] Hallucinations       Current Outpatient Medications   Medication Sig Dispense Refill    QUEtiapine (SEROQUEL) 50 MG tablet TAKE 1 TABLET BY MOUTH AT NIGHT 90 tablet 0    traZODone (DESYREL) 50 MG tablet Take 1 tablet by mouth as needed for Sleep      gabapentin (NEURONTIN) 300 MG capsule TAKE 1 CAPSULE BY MOUTH TWICE DAILY. MAX DAILY AMOUNT 600 MG INDICATIONS: NERVE PAIN 60 capsule 2    lisinopril (PRINIVIL;ZESTRIL) 10 MG tablet TAKE 1 TABLET BY MOUTH AT NIGHT 90 tablet 0    PARoxetine (PAXIL) 20 MG tablet Take 1 tablet by mouth daily 90 tablet 1    lovastatin (MEVACOR) 10 MG tablet Take 1 tablet by mouth nightly 90 tablet 1    ketoconazole (NIZORAL) 2 % cream Apply topically 2 times daily 30 g 1    ASPIRIN PO Take 81 mg by mouth daily      polyethylene glycol (GLYCOLAX) 17 GM/SCOOP powder Take 1 Package by mouth as needed       No current facility-administered medications for this visit.             Physical Examination:    /66 (Site: Left Upper Arm, Position: Sitting, Cuff Size: Medium Adult)   Pulse 75   Temp 98 °F (36.7 °C) (Oral)   Resp 16   Ht 1.829 m (6')   Wt 115.2 kg (254 lb)   SpO2 95%   BMI 34.45 kg/m²    General:  Alert, cooperative, no distress.   Extremities: Extremities normal, atraumatic, no cyanosis or edema.   Pulses: Absent bilateral pulses at DP and PT. Rubor of feet but cool   Skin: Skin color, texture, turgor

## 2025-01-07 ENCOUNTER — OFFICE VISIT (OUTPATIENT)
Dept: FAMILY MEDICINE CLINIC | Age: 85
End: 2025-01-07
Payer: MEDICARE

## 2025-01-07 VITALS
WEIGHT: 255 LBS | HEIGHT: 71 IN | BODY MASS INDEX: 35.7 KG/M2 | RESPIRATION RATE: 12 BRPM | OXYGEN SATURATION: 96 % | DIASTOLIC BLOOD PRESSURE: 72 MMHG | SYSTOLIC BLOOD PRESSURE: 126 MMHG | HEART RATE: 68 BPM | TEMPERATURE: 98 F

## 2025-01-07 DIAGNOSIS — L97.322 ANKLE ULCER, LEFT, WITH FAT LAYER EXPOSED (HCC): Primary | ICD-10-CM

## 2025-01-07 DIAGNOSIS — L03.116 CELLULITIS OF LEFT LOWER EXTREMITY: ICD-10-CM

## 2025-01-07 PROCEDURE — 1123F ACP DISCUSS/DSCN MKR DOCD: CPT | Performed by: FAMILY MEDICINE

## 2025-01-07 PROCEDURE — 1126F AMNT PAIN NOTED NONE PRSNT: CPT | Performed by: FAMILY MEDICINE

## 2025-01-07 PROCEDURE — 1159F MED LIST DOCD IN RCRD: CPT | Performed by: FAMILY MEDICINE

## 2025-01-07 PROCEDURE — 99213 OFFICE O/P EST LOW 20 MIN: CPT | Performed by: FAMILY MEDICINE

## 2025-01-07 RX ORDER — CEPHALEXIN 500 MG/1
500 CAPSULE ORAL 3 TIMES DAILY
Qty: 30 CAPSULE | Refills: 0 | Status: SHIPPED | OUTPATIENT
Start: 2025-01-07

## 2025-01-07 NOTE — PROGRESS NOTES
\"Have you been to the ER, urgent care clinic since your last visit?  Hospitalized since your last visit?\"    NO    “Have you seen or consulted any other health care providers outside our system since your last visit?”    NO         
   Number of children: None    Years of education: None    Highest education level: None   Tobacco Use    Smoking status: Former     Current packs/day: 0.00     Average packs/day: 1 pack/day for 49.2 years (49.2 ttl pk-yrs)     Types: Cigarettes     Start date: 1962     Quit date: 2011     Years since quittin.8    Smokeless tobacco: Never   Vaping Use    Vaping status: Never Used   Substance and Sexual Activity    Alcohol use: No    Drug use: Never     Social Determinants of Health     Financial Resource Strain: Low Risk  (3/19/2024)    Overall Financial Resource Strain (CARDIA)     Difficulty of Paying Living Expenses: Not hard at all   Food Insecurity: No Food Insecurity (3/19/2024)    Hunger Vital Sign     Worried About Running Out of Food in the Last Year: Never true     Ran Out of Food in the Last Year: Never true   Transportation Needs: Unknown (3/19/2024)    PRAPARE - Transportation     Lack of Transportation (Non-Medical): No   Physical Activity: Insufficiently Active (2024)    Exercise Vital Sign     Days of Exercise per Week: 1 day     Minutes of Exercise per Session: 20 min   Social Connections: Feeling Socially Integrated (2023)    [facility]    OASIS : Social Isolation     Frequency of experiencing loneliness or isolation: Never   Housing Stability: Unknown (3/19/2024)    Housing Stability Vital Sign     Unstable Housing in the Last Year: No       Family History   Problem Relation Age of Onset    Heart Disease Mother     Heart Disease Father          Physical Exam:  /72 (Site: Right Upper Arm, Position: Sitting, Cuff Size: Large Adult)   Pulse 68   Temp 98 °F (36.7 °C)   Resp 12   Ht 1.803 m (5' 11\")   Wt 115.7 kg (255 lb)   SpO2 96%   BMI 35.57 kg/m²   Physical Exam  Vitals and nursing note reviewed.   Constitutional:       Appearance: He is obese.   HENT:      Head: Normocephalic and atraumatic.   Eyes:

## 2025-01-10 ENCOUNTER — TELEPHONE (OUTPATIENT)
Dept: FAMILY MEDICINE CLINIC | Age: 85
End: 2025-01-10

## 2025-01-10 LAB
BACTERIA SPEC CULT: ABNORMAL
GRAM STN SPEC: ABNORMAL
GRAM STN SPEC: ABNORMAL
SERVICE CMNT-IMP: ABNORMAL

## 2025-01-10 NOTE — TELEPHONE ENCOUNTER
Patient's daughter, Eli (HIPAA verified) returning call for lab results. Her number is [phone]

## 2025-01-17 ENCOUNTER — TELEPHONE (OUTPATIENT)
Dept: FAMILY MEDICINE CLINIC | Age: 85
End: 2025-01-17

## 2025-01-17 NOTE — TELEPHONE ENCOUNTER
Odilia with [facility] radiology call to ask about this man's orders for a vascular duplex lower extremity arteries bilateral. They usually do not do this test unless pt has had previous vascular surgery.    Did you mean the order to be just a limited arterial brachial indices or want a arterial lower extremity  with out exercise      Please call Odilia at

## 2025-01-21 ENCOUNTER — OFFICE VISIT (OUTPATIENT)
Dept: FAMILY MEDICINE CLINIC | Age: 85
End: 2025-01-21

## 2025-01-21 VITALS
TEMPERATURE: 97.9 F | OXYGEN SATURATION: 97 % | BODY MASS INDEX: 34.54 KG/M2 | DIASTOLIC BLOOD PRESSURE: 60 MMHG | SYSTOLIC BLOOD PRESSURE: 124 MMHG | HEIGHT: 72 IN | WEIGHT: 255 LBS | HEART RATE: 78 BPM | RESPIRATION RATE: 12 BRPM

## 2025-01-21 DIAGNOSIS — L97.322 ANKLE ULCER, LEFT, WITH FAT LAYER EXPOSED (HCC): Primary | ICD-10-CM

## 2025-01-21 DIAGNOSIS — G47.00 INSOMNIA, UNSPECIFIED TYPE: ICD-10-CM

## 2025-01-21 DIAGNOSIS — F32.A DEPRESSION, UNSPECIFIED DEPRESSION TYPE: ICD-10-CM

## 2025-01-21 DIAGNOSIS — I10 PRIMARY HYPERTENSION: ICD-10-CM

## 2025-01-21 DIAGNOSIS — L03.116 CELLULITIS OF LEFT LOWER EXTREMITY: ICD-10-CM

## 2025-01-21 RX ORDER — TRAZODONE HYDROCHLORIDE 50 MG/1
TABLET, FILM COATED ORAL
Qty: 90 TABLET | Refills: 1 | Status: SHIPPED | OUTPATIENT
Start: 2025-01-21

## 2025-01-21 RX ORDER — CEPHALEXIN 500 MG/1
500 CAPSULE ORAL 3 TIMES DAILY
Qty: 30 CAPSULE | Refills: 0 | Status: SHIPPED | OUTPATIENT
Start: 2025-01-21

## 2025-01-21 RX ORDER — PAROXETINE 20 MG/1
10 TABLET, FILM COATED ORAL DAILY
Qty: 45 TABLET | Refills: 0
Start: 2025-01-21

## 2025-01-21 RX ORDER — LISINOPRIL 10 MG/1
TABLET ORAL
Qty: 90 TABLET | Refills: 0 | Status: SHIPPED | OUTPATIENT
Start: 2025-01-21

## 2025-01-21 ASSESSMENT — PATIENT HEALTH QUESTIONNAIRE - PHQ9
SUM OF ALL RESPONSES TO PHQ QUESTIONS 1-9: 0
1. LITTLE INTEREST OR PLEASURE IN DOING THINGS: NOT AT ALL
2. FEELING DOWN, DEPRESSED OR HOPELESS: NOT AT ALL
SUM OF ALL RESPONSES TO PHQ QUESTIONS 1-9: 0
SUM OF ALL RESPONSES TO PHQ9 QUESTIONS 1 & 2: 0

## 2025-01-21 NOTE — PROGRESS NOTES
Jef Sellers is a 85 y.o. male who presents to the office today with the following:  Chief Complaint   Patient presents with    Wound Check     Will schedule for wellness later       Wound Check      Left ankle ulcer and cellulitis  On Keflex tid until yesterday  Better per daughter  May still need some more Abx    Had arterial study done and that was nl    Needs refills of Trazodone  Taking 1-2 at night prn, not every night    Has dreams with movement now  Daughter concerned  Fell out of recliner once  Last med Paxil  Depression better and sleeping better    Needs refill of Lisinopril  BP is good  Last BW 9/24 with Potassium slightly up    Review of Systems    See HPI.    Past Medical History:   Diagnosis Date    Depression     Hypercholesterolemia     Insomnia     Memory loss     Obesity     Peripheral neuropathy     PVC's (premature ventricular contractions)     Type 2 diabetes mellitus treated without insulin ([facility])        Past Surgical History:   Procedure Laterality Date    COLONOSCOPY  2003, 2010    [place] UNLISTED PROCEDURE CARDIAC SURGERY  9/2010    catheterization       Allergies   Allergen Reactions    Wellbutrin [Bupropion] Hallucinations       Current Outpatient Medications   Medication Sig Dispense Refill    cephALEXin (KEFLEX) 500 MG capsule Take 1 capsule by mouth 3 times daily 30 capsule 0    lisinopril (PRINIVIL;ZESTRIL) 10 MG tablet TAKE 1 TABLET BY MOUTH AT NIGHT 90 tablet 0    traZODone (DESYREL) 50 MG tablet Take 1-2 tablets prn at night for Insomnia 90 tablet 1    PARoxetine (PAXIL) 20 MG tablet Take 0.5 tablets by mouth daily 45 tablet 0    NONFORMULARY Xinc tab, 1 daily      QUEtiapine (SEROQUEL) 50 MG tablet TAKE 1 TABLET BY MOUTH AT NIGHT 90 tablet 0    gabapentin (NEURONTIN) 300 MG capsule TAKE 1 CAPSULE BY MOUTH TWICE DAILY. MAX DAILY AMOUNT 600 MG INDICATIONS: NERVE PAIN 60 capsule 2    lovastatin (MEVACOR) 10 MG tablet Take 1 tablet by mouth nightly 90 tablet 1    ketoconazole

## 2025-01-27 DIAGNOSIS — R20.9 BILATERAL COLD FEET: ICD-10-CM

## 2025-02-04 ENCOUNTER — OFFICE VISIT (OUTPATIENT)
Dept: FAMILY MEDICINE CLINIC | Age: 85
End: 2025-02-04

## 2025-02-04 VITALS
HEIGHT: 72 IN | BODY MASS INDEX: 34.54 KG/M2 | OXYGEN SATURATION: 94 % | SYSTOLIC BLOOD PRESSURE: 139 MMHG | TEMPERATURE: 97.8 F | HEART RATE: 72 BPM | WEIGHT: 255 LBS | RESPIRATION RATE: 12 BRPM | DIASTOLIC BLOOD PRESSURE: 78 MMHG

## 2025-02-04 DIAGNOSIS — L03.116 CELLULITIS OF LEFT LOWER EXTREMITY: ICD-10-CM

## 2025-02-04 DIAGNOSIS — S91.101A OPEN WOUND OF RIGHT GREAT TOE, INITIAL ENCOUNTER: ICD-10-CM

## 2025-02-04 DIAGNOSIS — Z51.89 VISIT FOR WOUND CHECK: Primary | ICD-10-CM

## 2025-02-04 RX ORDER — CEPHALEXIN 500 MG/1
500 CAPSULE ORAL 3 TIMES DAILY
Qty: 30 CAPSULE | Refills: 0 | Status: SHIPPED | OUTPATIENT
Start: 2025-02-04

## 2025-02-04 ASSESSMENT — PATIENT HEALTH QUESTIONNAIRE - PHQ9
SUM OF ALL RESPONSES TO PHQ9 QUESTIONS 1 & 2: 0
2. FEELING DOWN, DEPRESSED OR HOPELESS: NOT AT ALL
1. LITTLE INTEREST OR PLEASURE IN DOING THINGS: NOT AT ALL
SUM OF ALL RESPONSES TO PHQ QUESTIONS 1-9: 0

## 2025-02-04 NOTE — PROGRESS NOTES
\"Have you been to the ER, urgent care clinic since your last visit?  Hospitalized since your last visit?\"    NO    “Have you seen or consulted any other health care providers outside our system since your last visit?”    NO       \"Have you been to the ER, urgent care clinic since your last visit?  Hospitalized since your last visit?\"    NO    “Have you seen or consulted any other health care providers outside our system since your last visit?”    NO

## 2025-02-04 NOTE — PROGRESS NOTES
Jef Sellers is a 85 y.o. male who presents to the office today with the following:  Chief Complaint   Patient presents with    Wound Check     Reopened from fall on 2.3.25       Wound Check      Left lateral ankle wound getting better    But yesterday slipped and got wound on right great toe  With some redness  Hardly has feeling in it    Ran out of Keflex 2 d ago  No SE      Review of Systems    See HPI.    Past Medical History:   Diagnosis Date    Depression     Hypercholesterolemia     Insomnia     Memory loss     Obesity     Peripheral neuropathy     PVC's (premature ventricular contractions)     Type 2 diabetes mellitus treated without insulin ([facility])        Past Surgical History:   Procedure Laterality Date    COLONOSCOPY  2003, 2010    [place] UNLISTED PROCEDURE CARDIAC SURGERY  9/2010    catheterization       Allergies   Allergen Reactions    Wellbutrin [Bupropion] Hallucinations       Current Outpatient Medications   Medication Sig Dispense Refill    cephALEXin (KEFLEX) 500 MG capsule Take 1 capsule by mouth 3 times daily 30 capsule 0    lisinopril (PRINIVIL;ZESTRIL) 10 MG tablet TAKE 1 TABLET BY MOUTH AT NIGHT 90 tablet 0    traZODone (DESYREL) 50 MG tablet Take 1-2 tablets prn at night for Insomnia 90 tablet 1    PARoxetine (PAXIL) 20 MG tablet Take 0.5 tablets by mouth daily 45 tablet 0    NONFORMULARY Xinc tab, 1 daily      QUEtiapine (SEROQUEL) 50 MG tablet TAKE 1 TABLET BY MOUTH AT NIGHT 90 tablet 0    gabapentin (NEURONTIN) 300 MG capsule TAKE 1 CAPSULE BY MOUTH TWICE DAILY. MAX DAILY AMOUNT 600 MG INDICATIONS: NERVE PAIN 60 capsule 2    lovastatin (MEVACOR) 10 MG tablet Take 1 tablet by mouth nightly 90 tablet 1    ketoconazole (NIZORAL) 2 % cream Apply topically 2 times daily 30 g 1    ASPIRIN PO Take 81 mg by mouth daily      polyethylene glycol (GLYCOLAX) 17 GM/SCOOP powder Take 1 Package by mouth as needed       No current facility-administered medications for this visit.       Social History

## 2025-02-23 DIAGNOSIS — E78.5 HYPERLIPIDEMIA, UNSPECIFIED HYPERLIPIDEMIA TYPE: ICD-10-CM

## 2025-02-23 DIAGNOSIS — F41.9 ANXIETY: ICD-10-CM

## 2025-02-24 NOTE — TELEPHONE ENCOUNTER
Patient requesting refill on     Requested Prescriptions     Pending Prescriptions Disp Refills    lovastatin (MEVACOR) 10 MG tablet [Pharmacy Med Name: LOVASTATIN 10MG TABLETS] 90 tablet 1     Sig: TAKE 1 TABLET BY MOUTH EVERY NIGHT    QUEtiapine (SEROQUEL) 50 MG tablet [Pharmacy Med Name: QUETIAPINE 50MG TABLETS] 90 tablet 0     Sig: TAKE 1 TABLET BY MOUTH AT NIGHT        Last OV 2/4/2025

## 2025-02-25 ENCOUNTER — OFFICE VISIT (OUTPATIENT)
Dept: FAMILY MEDICINE CLINIC | Age: 85
End: 2025-02-25
Payer: MEDICARE

## 2025-02-25 VITALS
RESPIRATION RATE: 12 BRPM | OXYGEN SATURATION: 95 % | BODY MASS INDEX: 34.4 KG/M2 | HEIGHT: 72 IN | TEMPERATURE: 98.1 F | HEART RATE: 82 BPM | WEIGHT: 254 LBS | SYSTOLIC BLOOD PRESSURE: 92 MMHG | DIASTOLIC BLOOD PRESSURE: 59 MMHG

## 2025-02-25 DIAGNOSIS — Z00.00 MEDICARE ANNUAL WELLNESS VISIT, SUBSEQUENT: ICD-10-CM

## 2025-02-25 DIAGNOSIS — F05 VASCULAR DEMENTIA WITH DELIRIUM (HCC): ICD-10-CM

## 2025-02-25 DIAGNOSIS — D72.829 LEUKOCYTOSIS, UNSPECIFIED TYPE: ICD-10-CM

## 2025-02-25 DIAGNOSIS — F41.9 ANXIETY: ICD-10-CM

## 2025-02-25 DIAGNOSIS — Z51.89 VISIT FOR WOUND CHECK: Primary | ICD-10-CM

## 2025-02-25 DIAGNOSIS — I10 PRIMARY HYPERTENSION: ICD-10-CM

## 2025-02-25 DIAGNOSIS — F32.A DEPRESSION, UNSPECIFIED DEPRESSION TYPE: ICD-10-CM

## 2025-02-25 DIAGNOSIS — E11.42 TYPE 2 DIABETES MELLITUS WITH DIABETIC POLYNEUROPATHY, WITHOUT LONG-TERM CURRENT USE OF INSULIN (HCC): ICD-10-CM

## 2025-02-25 DIAGNOSIS — E78.5 HYPERLIPIDEMIA, UNSPECIFIED HYPERLIPIDEMIA TYPE: ICD-10-CM

## 2025-02-25 DIAGNOSIS — F01.50 VASCULAR DEMENTIA WITH DELIRIUM (HCC): ICD-10-CM

## 2025-02-25 PROCEDURE — 3078F DIAST BP <80 MM HG: CPT | Performed by: FAMILY MEDICINE

## 2025-02-25 PROCEDURE — 3074F SYST BP LT 130 MM HG: CPT | Performed by: FAMILY MEDICINE

## 2025-02-25 PROCEDURE — 1159F MED LIST DOCD IN RCRD: CPT | Performed by: FAMILY MEDICINE

## 2025-02-25 PROCEDURE — G0439 PPPS, SUBSEQ VISIT: HCPCS | Performed by: FAMILY MEDICINE

## 2025-02-25 PROCEDURE — 1160F RVW MEDS BY RX/DR IN RCRD: CPT | Performed by: FAMILY MEDICINE

## 2025-02-25 PROCEDURE — 99214 OFFICE O/P EST MOD 30 MIN: CPT | Performed by: FAMILY MEDICINE

## 2025-02-25 PROCEDURE — 1123F ACP DISCUSS/DSCN MKR DOCD: CPT | Performed by: FAMILY MEDICINE

## 2025-02-25 RX ORDER — PAROXETINE 20 MG/1
10 TABLET, FILM COATED ORAL DAILY
Qty: 45 TABLET | Refills: 1 | Status: SHIPPED | OUTPATIENT
Start: 2025-02-25

## 2025-02-25 RX ORDER — LOVASTATIN 10 MG/1
10 TABLET ORAL NIGHTLY
Qty: 90 TABLET | Refills: 1 | OUTPATIENT
Start: 2025-02-25

## 2025-02-25 RX ORDER — QUETIAPINE FUMARATE 50 MG/1
TABLET, FILM COATED ORAL
Qty: 90 TABLET | Refills: 0 | OUTPATIENT
Start: 2025-02-25

## 2025-02-25 RX ORDER — LOVASTATIN 10 MG/1
10 TABLET ORAL NIGHTLY
Qty: 90 TABLET | Refills: 1 | Status: SHIPPED | OUTPATIENT
Start: 2025-02-25

## 2025-02-25 RX ORDER — QUETIAPINE FUMARATE 50 MG/1
TABLET, FILM COATED ORAL
Qty: 90 TABLET | Refills: 1 | Status: SHIPPED | OUTPATIENT
Start: 2025-02-25

## 2025-02-25 ASSESSMENT — PATIENT HEALTH QUESTIONNAIRE - PHQ9
9. THOUGHTS THAT YOU WOULD BE BETTER OFF DEAD, OR OF HURTING YOURSELF: SEVERAL DAYS
SUM OF ALL RESPONSES TO PHQ QUESTIONS 1-9: 17
1. LITTLE INTEREST OR PLEASURE IN DOING THINGS: MORE THAN HALF THE DAYS
SUM OF ALL RESPONSES TO PHQ QUESTIONS 1-9: 17
SUM OF ALL RESPONSES TO PHQ QUESTIONS 1-9: 17
5. POOR APPETITE OR OVEREATING: MORE THAN HALF THE DAYS
10. IF YOU CHECKED OFF ANY PROBLEMS, HOW DIFFICULT HAVE THESE PROBLEMS MADE IT FOR YOU TO DO YOUR WORK, TAKE CARE OF THINGS AT HOME, OR GET ALONG WITH OTHER PEOPLE: VERY DIFFICULT
7. TROUBLE CONCENTRATING ON THINGS, SUCH AS READING THE NEWSPAPER OR WATCHING TELEVISION: MORE THAN HALF THE DAYS
SUM OF ALL RESPONSES TO PHQ QUESTIONS 1-9: 16
8. MOVING OR SPEAKING SO SLOWLY THAT OTHER PEOPLE COULD HAVE NOTICED. OR THE OPPOSITE, BEING SO FIGETY OR RESTLESS THAT YOU HAVE BEEN MOVING AROUND A LOT MORE THAN USUAL: MORE THAN HALF THE DAYS
4. FEELING TIRED OR HAVING LITTLE ENERGY: MORE THAN HALF THE DAYS
6. FEELING BAD ABOUT YOURSELF - OR THAT YOU ARE A FAILURE OR HAVE LET YOURSELF OR YOUR FAMILY DOWN: MORE THAN HALF THE DAYS
SUM OF ALL RESPONSES TO PHQ9 QUESTIONS 1 & 2: 4
2. FEELING DOWN, DEPRESSED OR HOPELESS: MORE THAN HALF THE DAYS
3. TROUBLE FALLING OR STAYING ASLEEP: MORE THAN HALF THE DAYS

## 2025-02-25 ASSESSMENT — ENCOUNTER SYMPTOMS
SHORTNESS OF BREATH: 0
COUGH: 0

## 2025-02-25 NOTE — PROGRESS NOTES
Jef Sellers is a 85 y.o. male who presents to the office today with the following:  Chief Complaint   Patient presents with    Wound Check    Medicare AWV       Wound Check      Pt here for wound check right great toe and left ankle  And Cellulitis  still on Keflex    DM  No BS checks at home    Neuropathy  Taking Gabapentin bid  Just getting last refill    HTN  BP low  No recent dizziness    Memory loss with word finding    Depression  Still some since not doing much  Then gets depressed since not doing much  Does not want to change meds    Insomnia  Has not slept much lately but did not take Trazodone either    No recent falls    Last Chol good  Needs refills    HM reviewed      Review of Systems   Constitutional:  Negative for unexpected weight change.   Respiratory:  Negative for cough and shortness of breath.    Cardiovascular:  Negative for chest pain.   Neurological:  Negative for dizziness and headaches.   Psychiatric/Behavioral:  Negative for dysphoric mood. The patient is not nervous/anxious.        See HPI.    Past Medical History:   Diagnosis Date    Depression     Hypercholesterolemia     Insomnia     Memory loss     Obesity     Peripheral neuropathy     PVC's (premature ventricular contractions)     Type 2 diabetes mellitus treated without insulin ([facility])        Past Surgical History:   Procedure Laterality Date    COLONOSCOPY  2003, 2010    [place] UNLISTED PROCEDURE CARDIAC SURGERY  9/2010    catheterization       Allergies   Allergen Reactions    Wellbutrin [Bupropion] Hallucinations       Current Outpatient Medications   Medication Sig Dispense Refill    lovastatin (MEVACOR) 10 MG tablet Take 1 tablet by mouth nightly 90 tablet 1    QUEtiapine (SEROQUEL) 50 MG tablet TAKE 1 TABLET BY MOUTH at night 90 tablet 1    PARoxetine (PAXIL) 20 MG tablet Take 0.5 tablets by mouth daily 45 tablet 1    lisinopril (PRINIVIL;ZESTRIL) 10 MG tablet TAKE 1 TABLET BY MOUTH AT NIGHT 90 tablet 0    traZODone (DESYREL)

## 2025-02-27 LAB
ALBUMIN SERPL-MCNC: 4 G/DL (ref 3.7–4.7)
ALP SERPL-CCNC: 74 IU/L (ref 44–121)
ALT SERPL-CCNC: 9 IU/L (ref 0–44)
AST SERPL-CCNC: 13 IU/L (ref 0–40)
BASOPHILS # BLD AUTO: 0.1 X10E3/UL (ref 0–0.2)
BASOPHILS NFR BLD AUTO: 0 %
BILIRUB SERPL-MCNC: 0.3 MG/DL (ref 0–1.2)
BUN SERPL-MCNC: 24 MG/DL (ref 8–27)
BUN/CREAT SERPL: 23 (ref 10–24)
CALCIUM SERPL-MCNC: 9.2 MG/DL (ref 8.6–10.2)
CHLORIDE SERPL-SCNC: 102 MMOL/L (ref 96–106)
CO2 SERPL-SCNC: 27 MMOL/L (ref 20–29)
CREAT SERPL-MCNC: 1.04 MG/DL (ref 0.76–1.27)
EGFRCR SERPLBLD CKD-EPI 2021: 71 ML/MIN/1.73
EOSINOPHIL # BLD AUTO: 0.3 X10E3/UL (ref 0–0.4)
EOSINOPHIL NFR BLD AUTO: 2 %
ERYTHROCYTE [DISTWIDTH] IN BLOOD BY AUTOMATED COUNT: 13.1 % (ref 11.6–15.4)
GLOBULIN SER CALC-MCNC: 2.9 G/DL (ref 1.5–4.5)
GLUCOSE SERPL-MCNC: 96 MG/DL (ref 70–99)
HBA1C MFR BLD: 5.9 % (ref 4.8–5.6)
HCT VFR BLD AUTO: 37.3 % (ref 37.5–51)
HGB BLD-MCNC: 12.2 G/DL (ref 13–17.7)
IMM GRANULOCYTES # BLD AUTO: 0 X10E3/UL (ref 0–0.1)
IMM GRANULOCYTES NFR BLD AUTO: 0 %
LYMPHOCYTES # BLD AUTO: 2.4 X10E3/UL (ref 0.7–3.1)
LYMPHOCYTES NFR BLD AUTO: 20 %
MCH RBC QN AUTO: 32.3 PG (ref 26.6–33)
MCHC RBC AUTO-ENTMCNC: 32.7 G/DL (ref 31.5–35.7)
MCV RBC AUTO: 99 FL (ref 79–97)
MONOCYTES # BLD AUTO: 0.7 X10E3/UL (ref 0.1–0.9)
MONOCYTES NFR BLD AUTO: 6 %
NEUTROPHILS # BLD AUTO: 8.1 X10E3/UL (ref 1.4–7)
NEUTROPHILS NFR BLD AUTO: 72 %
PLATELET # BLD AUTO: 325 X10E3/UL (ref 150–450)
POTASSIUM SERPL-SCNC: 4.7 MMOL/L (ref 3.5–5.2)
PROT SERPL-MCNC: 6.9 G/DL (ref 6–8.5)
RBC # BLD AUTO: 3.78 X10E6/UL (ref 4.14–5.8)
SODIUM SERPL-SCNC: 142 MMOL/L (ref 134–144)
WBC # BLD AUTO: 11.6 X10E3/UL (ref 3.4–10.8)

## 2025-02-27 RX ORDER — SULFAMETHOXAZOLE AND TRIMETHOPRIM 800; 160 MG/1; MG/1
1 TABLET ORAL 2 TIMES DAILY
Qty: 28 TABLET | Refills: 0 | Status: SHIPPED | OUTPATIENT
Start: 2025-02-27 | End: 2025-03-13

## 2025-03-05 DIAGNOSIS — G62.9 NEUROPATHY: ICD-10-CM

## 2025-03-05 NOTE — TELEPHONE ENCOUNTER
Requested Prescriptions     Pending Prescriptions Disp Refills    gabapentin (NEURONTIN) 300 MG capsule [Pharmacy Med Name: GABAPENTIN 300MG CAPSULES] 60 capsule      Sig: TAKE 1 CAPSULE BY MOUTH TWICE DAILY. MAX DAILY AMOUNT 600 MG INDICATIONS: NERVE PAIN       LOV 2/25/25   Last labs 2/26/25  Last refill 12/9/24 #60 with 2 RF

## 2025-03-06 RX ORDER — GABAPENTIN 300 MG/1
CAPSULE ORAL
Qty: 60 CAPSULE | Refills: 2 | Status: SHIPPED | OUTPATIENT
Start: 2025-03-06 | End: 2028-03-06

## 2025-03-20 DIAGNOSIS — B37.2 MONILIASIS SKIN: Primary | ICD-10-CM

## 2025-03-20 RX ORDER — CLOTRIMAZOLE 1 %
CREAM (GRAM) TOPICAL
Qty: 28 G | Refills: 1 | Status: SHIPPED | OUTPATIENT
Start: 2025-03-20 | End: 2025-03-27

## 2025-04-01 ENCOUNTER — OFFICE VISIT (OUTPATIENT)
Dept: FAMILY MEDICINE CLINIC | Age: 85
End: 2025-04-01
Payer: MEDICARE

## 2025-04-01 VITALS
HEIGHT: 72 IN | SYSTOLIC BLOOD PRESSURE: 118 MMHG | WEIGHT: 256 LBS | TEMPERATURE: 98 F | OXYGEN SATURATION: 98 % | HEART RATE: 78 BPM | RESPIRATION RATE: 12 BRPM | DIASTOLIC BLOOD PRESSURE: 59 MMHG | BODY MASS INDEX: 34.67 KG/M2

## 2025-04-01 DIAGNOSIS — B37.2 MONILIASIS SKIN: Primary | ICD-10-CM

## 2025-04-01 DIAGNOSIS — Z51.89 VISIT FOR WOUND CHECK: ICD-10-CM

## 2025-04-01 PROCEDURE — 99213 OFFICE O/P EST LOW 20 MIN: CPT | Performed by: FAMILY MEDICINE

## 2025-04-01 PROCEDURE — 1123F ACP DISCUSS/DSCN MKR DOCD: CPT | Performed by: FAMILY MEDICINE

## 2025-04-01 PROCEDURE — 1126F AMNT PAIN NOTED NONE PRSNT: CPT | Performed by: FAMILY MEDICINE

## 2025-04-01 RX ORDER — CLOTRIMAZOLE 1 %
CREAM (GRAM) TOPICAL
Qty: 60 G | Refills: 1 | Status: SHIPPED | OUTPATIENT
Start: 2025-04-01 | End: 2025-04-08

## 2025-04-01 NOTE — PROGRESS NOTES
Jef Sellers is a 85 y.o. male who presents to the office today with the following:  Chief Complaint   Patient presents with    Wound Check       HPI  Pt here for wound check  Left lateral ankle wound was 1.5 x 1 cm a mo ago  And right great toe wound 0.5 x 1 cm    Was on Keflex for Cellulitis and finished that  Legs still with dry skin  Using vaseline    Rash in left groin not better per daughter, although she did not see it lately  Has been using Nystatin cream    With exam daughter states it is better      Review of Systems    See HPI.    Past Medical History:   Diagnosis Date    Depression     Hypercholesterolemia     Insomnia     Memory loss     Obesity     Peripheral neuropathy     PVC's (premature ventricular contractions)     Type 2 diabetes mellitus treated without insulin ([facility])        Past Surgical History:   Procedure Laterality Date    COLONOSCOPY  2003, 2010    [place] UNLISTED PROCEDURE CARDIAC SURGERY  9/2010    catheterization       Allergies   Allergen Reactions    Wellbutrin [Bupropion] Hallucinations       Current Outpatient Medications   Medication Sig Dispense Refill    clotrimazole (LOTRIMIN AF) 1 % cream Apply topically 2 times daily. 60 g 1    gabapentin (NEURONTIN) 300 MG capsule TAKE 1 CAPSULE BY MOUTH TWICE DAILY. MAX DAILY AMOUNT 600 MG INDICATIONS: NERVE PAIN 60 capsule 2    lovastatin (MEVACOR) 10 MG tablet Take 1 tablet by mouth nightly 90 tablet 1    QUEtiapine (SEROQUEL) 50 MG tablet TAKE 1 TABLET BY MOUTH at night 90 tablet 1    PARoxetine (PAXIL) 20 MG tablet Take 0.5 tablets by mouth daily 45 tablet 1    lisinopril (PRINIVIL;ZESTRIL) 10 MG tablet TAKE 1 TABLET BY MOUTH AT NIGHT 90 tablet 0    traZODone (DESYREL) 50 MG tablet Take 1-2 tablets prn at night for Insomnia 90 tablet 1    NONFORMULARY Xinc tab, 1 daily      ketoconazole (NIZORAL) 2 % cream Apply topically 2 times daily 30 g 1    ASPIRIN PO Take 81 mg by mouth daily      polyethylene glycol (GLYCOLAX) 17 GM/SCOOP powder

## 2025-04-14 DIAGNOSIS — L97.322 ANKLE ULCER, LEFT, WITH FAT LAYER EXPOSED (HCC): Primary | ICD-10-CM

## 2025-04-17 DIAGNOSIS — I10 PRIMARY HYPERTENSION: ICD-10-CM

## 2025-04-18 RX ORDER — LISINOPRIL 10 MG/1
10 TABLET ORAL NIGHTLY
Qty: 90 TABLET | Refills: 0 | Status: SHIPPED | OUTPATIENT
Start: 2025-04-18

## 2025-05-06 DIAGNOSIS — I10 PRIMARY HYPERTENSION: ICD-10-CM

## 2025-05-06 DIAGNOSIS — G47.00 INSOMNIA, UNSPECIFIED TYPE: ICD-10-CM

## 2025-05-06 RX ORDER — TRAZODONE HYDROCHLORIDE 50 MG/1
TABLET ORAL
Qty: 90 TABLET | Refills: 1 | Status: SHIPPED | OUTPATIENT
Start: 2025-05-06

## 2025-05-06 NOTE — TELEPHONE ENCOUNTER
Patient requesting refill on     Requested Prescriptions     Pending Prescriptions Disp Refills    traZODone (DESYREL) 50 MG tablet [Pharmacy Med Name: TRAZODONE 50MG TABLETS] 90 tablet 1     Sig: TAKE 1 TO 2 TABLETS BY MOUTH AT NIGHT AS NEEDED FOR INSOMNIA        Last OV 4/1/2025   
Suicide Prevention [employer] Phone: [phone] ([phone])

## 2025-05-07 RX ORDER — LISINOPRIL 10 MG/1
10 TABLET ORAL NIGHTLY
Qty: 90 TABLET | Refills: 0 | Status: SHIPPED | OUTPATIENT
Start: 2025-05-07

## 2025-06-24 ENCOUNTER — OFFICE VISIT (OUTPATIENT)
Dept: FAMILY MEDICINE CLINIC | Age: 85
End: 2025-06-24
Payer: MEDICARE

## 2025-06-24 VITALS
RESPIRATION RATE: 12 BRPM | SYSTOLIC BLOOD PRESSURE: 112 MMHG | OXYGEN SATURATION: 93 % | DIASTOLIC BLOOD PRESSURE: 75 MMHG | HEART RATE: 82 BPM | HEIGHT: 71 IN | TEMPERATURE: 98 F | WEIGHT: 250 LBS | BODY MASS INDEX: 35 KG/M2

## 2025-06-24 DIAGNOSIS — F32.A DEPRESSION, UNSPECIFIED DEPRESSION TYPE: Primary | ICD-10-CM

## 2025-06-24 DIAGNOSIS — G62.9 NEUROPATHY: ICD-10-CM

## 2025-06-24 DIAGNOSIS — M25.552 LEFT HIP PAIN: ICD-10-CM

## 2025-06-24 PROCEDURE — 1125F AMNT PAIN NOTED PAIN PRSNT: CPT | Performed by: FAMILY MEDICINE

## 2025-06-24 PROCEDURE — 1123F ACP DISCUSS/DSCN MKR DOCD: CPT | Performed by: FAMILY MEDICINE

## 2025-06-24 PROCEDURE — 1160F RVW MEDS BY RX/DR IN RCRD: CPT | Performed by: FAMILY MEDICINE

## 2025-06-24 PROCEDURE — 1159F MED LIST DOCD IN RCRD: CPT | Performed by: FAMILY MEDICINE

## 2025-06-24 PROCEDURE — 99214 OFFICE O/P EST MOD 30 MIN: CPT | Performed by: FAMILY MEDICINE

## 2025-06-24 RX ORDER — PAROXETINE 40 MG/1
40 TABLET, FILM COATED ORAL DAILY
Qty: 30 TABLET | Refills: 1 | Status: SHIPPED | OUTPATIENT
Start: 2025-06-24

## 2025-06-24 RX ORDER — GABAPENTIN 300 MG/1
CAPSULE ORAL
Qty: 180 CAPSULE | Refills: 0 | Status: SHIPPED | OUTPATIENT
Start: 2025-06-24 | End: 2028-06-24

## 2025-06-24 NOTE — PROGRESS NOTES
Jef Sellers is a 85 y.o. male who presents to the office today with the following:  Chief Complaint   Patient presents with    Medication Refill    Depression     Paxil has little to no effect       Medication Refill  Associated symptoms include arthralgias.   DepressionPatient is not experiencing: nervousness/anxiety.        Pt here for refill of Gabapentin  Taking 300 mg bid  And helping  For leg pain/Neuropathy    On Paxil 20 mg since last visit  Not helping much  Feeling depressed  Worse than before    Left upper leg Pain 4/10 now  Occ flare up    Seeing foot specialist  Wound almost healed    Review of Systems   Musculoskeletal:  Positive for arthralgias.   Psychiatric/Behavioral:  Positive for depression and dysphoric mood. The patient is not nervous/anxious.        See HPI.    Past Medical History:   Diagnosis Date    Depression     Hypercholesterolemia     Insomnia     Memory loss     Obesity     Peripheral neuropathy     PVC's (premature ventricular contractions)     Type 2 diabetes mellitus treated without insulin ([facility])        Past Surgical History:   Procedure Laterality Date    COLONOSCOPY  2003, 2010    [place] UNLISTED PROCEDURE CARDIAC SURGERY  9/2010    catheterization       Allergies   Allergen Reactions    Wellbutrin [Bupropion] Hallucinations       Current Outpatient Medications   Medication Sig Dispense Refill    PARoxetine (PAXIL) 40 MG tablet Take 1 tablet by mouth daily 30 tablet 1    gabapentin (NEURONTIN) 300 MG capsule TAKE 1 CAPSULE BY MOUTH TWICE DAILY. MAX DAILY AMOUNT 600 MG INDICATIONS: NERVE PAIN 180 capsule 0    lisinopril (PRINIVIL;ZESTRIL) 10 MG tablet TAKE 1 TABLET BY MOUTH AT NIGHT 90 tablet 0    traZODone (DESYREL) 50 MG tablet TAKE 1 TO 2 TABLETS BY MOUTH AT NIGHT AS NEEDED FOR INSOMNIA 90 tablet 1    lovastatin (MEVACOR) 10 MG tablet Take 1 tablet by mouth nightly 90 tablet 1    QUEtiapine (SEROQUEL) 50 MG tablet TAKE 1 TABLET BY MOUTH at night 90 tablet 1    PARoxetine

## 2025-08-07 DIAGNOSIS — E78.5 HYPERLIPIDEMIA, UNSPECIFIED HYPERLIPIDEMIA TYPE: ICD-10-CM

## 2025-08-08 RX ORDER — LOVASTATIN 10 MG/1
10 TABLET ORAL NIGHTLY
Qty: 90 TABLET | Refills: 0 | Status: SHIPPED | OUTPATIENT
Start: 2025-08-08

## 2025-08-15 DIAGNOSIS — F32.A DEPRESSION, UNSPECIFIED DEPRESSION TYPE: ICD-10-CM

## 2025-08-15 RX ORDER — PAROXETINE 20 MG/1
20 TABLET, FILM COATED ORAL DAILY
Qty: 90 TABLET | Refills: 0 | Status: SHIPPED | OUTPATIENT
Start: 2025-08-15

## 2025-08-21 DIAGNOSIS — F32.A DEPRESSION, UNSPECIFIED DEPRESSION TYPE: ICD-10-CM

## 2025-08-22 RX ORDER — PAROXETINE 40 MG/1
40 TABLET, FILM COATED ORAL DAILY
Qty: 90 TABLET | Refills: 0 | Status: SHIPPED | OUTPATIENT
Start: 2025-08-22